# Patient Record
Sex: FEMALE | Race: WHITE | NOT HISPANIC OR LATINO | Employment: OTHER | URBAN - METROPOLITAN AREA
[De-identification: names, ages, dates, MRNs, and addresses within clinical notes are randomized per-mention and may not be internally consistent; named-entity substitution may affect disease eponyms.]

---

## 2023-02-07 PROBLEM — R90.89 MRI OF BRAIN ABNORMAL: Status: ACTIVE | Noted: 2023-02-07

## 2023-02-07 PROBLEM — E66.811 CLASS 1 OBESITY WITH BODY MASS INDEX (BMI) OF 30.0 TO 30.9 IN ADULT: Status: ACTIVE | Noted: 2023-02-07

## 2023-02-07 PROBLEM — B37.0 ORAL THRUSH: Status: ACTIVE | Noted: 2023-02-07

## 2023-02-07 PROBLEM — R53.81 MALAISE AND FATIGUE: Status: ACTIVE | Noted: 2023-02-07

## 2023-02-07 PROBLEM — K20.80 ESOPHAGITIS, LOS ANGELES GRADE B: Status: ACTIVE | Noted: 2023-02-07

## 2023-02-07 PROBLEM — G51.0 FACIAL PARALYSIS ON LEFT SIDE: Status: ACTIVE | Noted: 2023-02-07

## 2023-02-07 PROBLEM — R00.1 BRADYCARDIA, SINUS: Status: ACTIVE | Noted: 2023-02-07

## 2023-02-07 PROBLEM — I35.0 AORTIC STENOSIS: Status: ACTIVE | Noted: 2023-02-07

## 2023-02-07 PROBLEM — D32.9 MENINGIOMA (MULTI): Status: ACTIVE | Noted: 2023-02-07

## 2023-02-07 PROBLEM — R05.9 COUGH: Status: ACTIVE | Noted: 2023-02-07

## 2023-02-07 PROBLEM — I35.0 MILD AORTIC STENOSIS: Status: ACTIVE | Noted: 2023-02-07

## 2023-02-07 PROBLEM — Z04.9 CONDITION NOT FOUND: Status: ACTIVE | Noted: 2023-02-07

## 2023-02-07 PROBLEM — J00 NASOPHARYNGITIS ACUTE: Status: ACTIVE | Noted: 2023-02-07

## 2023-02-07 PROBLEM — R53.83 MALAISE AND FATIGUE: Status: ACTIVE | Noted: 2023-02-07

## 2023-02-07 PROBLEM — R43.2: Status: ACTIVE | Noted: 2023-02-07

## 2023-02-07 PROBLEM — K63.5 POLYP OF COLON: Status: ACTIVE | Noted: 2023-02-07

## 2023-02-07 PROBLEM — H60.543 DERMATITIS OF BOTH EAR CANALS: Status: ACTIVE | Noted: 2023-02-07

## 2023-02-07 PROBLEM — L03.011 PARONYCHIA OF FINGER OF RIGHT HAND: Status: ACTIVE | Noted: 2023-02-07

## 2023-02-07 PROBLEM — Z85.3 HISTORY OF BREAST CANCER: Status: ACTIVE | Noted: 2023-02-07

## 2023-02-07 PROBLEM — U07.1 COVID-19 VIRUS INFECTION: Status: ACTIVE | Noted: 2023-02-07

## 2023-02-07 PROBLEM — M21.6X9 EQUINUS DEFORMITY OF FOOT: Status: ACTIVE | Noted: 2023-02-07

## 2023-02-07 PROBLEM — H10.11 ALLERGIC CONJUNCTIVITIS OF RIGHT EYE: Status: ACTIVE | Noted: 2023-02-07

## 2023-02-07 PROBLEM — R31.29 MICROHEMATURIA: Status: ACTIVE | Noted: 2023-02-07

## 2023-02-07 PROBLEM — G62.9 NEUROPATHY: Status: ACTIVE | Noted: 2023-02-07

## 2023-02-07 PROBLEM — R74.01 ELEVATED AST (SGOT): Status: ACTIVE | Noted: 2023-02-07

## 2023-02-07 PROBLEM — E78.00 HYPERCHOLESTEROLEMIA: Status: ACTIVE | Noted: 2023-02-07

## 2023-02-07 PROBLEM — I49.9 SUPRAVENTRICULAR ARRHYTHMIA: Status: ACTIVE | Noted: 2023-02-07

## 2023-02-07 PROBLEM — M25.512 ACUTE PAIN OF LEFT SHOULDER: Status: ACTIVE | Noted: 2023-02-07

## 2023-02-07 PROBLEM — F33.41 RECURRENT MAJOR DEPRESSIVE DISORDER, IN PARTIAL REMISSION (CMS-HCC): Status: ACTIVE | Noted: 2023-02-07

## 2023-02-07 PROBLEM — M54.50 ACUTE LOW BACK PAIN: Status: RESOLVED | Noted: 2023-02-07 | Resolved: 2023-02-07

## 2023-02-07 PROBLEM — R40.0 SOMNOLENCE, DAYTIME: Status: ACTIVE | Noted: 2023-02-07

## 2023-02-07 PROBLEM — R74.01 ELEVATED ALT MEASUREMENT: Status: ACTIVE | Noted: 2023-02-07

## 2023-02-07 PROBLEM — I63.81 LACUNAR INFARCTION (MULTI): Status: ACTIVE | Noted: 2023-02-07

## 2023-02-07 PROBLEM — M19.012 DEGENERATIVE ARTHRITIS OF LEFT SHOULDER REGION: Status: ACTIVE | Noted: 2023-02-07

## 2023-02-07 PROBLEM — R53.83 FATIGUE: Status: ACTIVE | Noted: 2023-02-07

## 2023-02-07 PROBLEM — L60.3 DYSTROPHIC NAIL: Status: ACTIVE | Noted: 2023-02-07

## 2023-02-07 PROBLEM — E87.0 HYPERNATREMIA: Status: ACTIVE | Noted: 2023-02-07

## 2023-02-07 PROBLEM — F41.1 GENERALIZED ANXIETY DISORDER: Status: ACTIVE | Noted: 2023-02-07

## 2023-02-07 PROBLEM — M79.671 FOOT PAIN, BILATERAL: Status: ACTIVE | Noted: 2023-02-07

## 2023-02-07 PROBLEM — R19.7 DIARRHEA: Status: ACTIVE | Noted: 2023-02-07

## 2023-02-07 PROBLEM — I10 BENIGN ESSENTIAL HYPERTENSION: Status: ACTIVE | Noted: 2023-02-07

## 2023-02-07 PROBLEM — G51.0 LEFT-SIDED BELL'S PALSY: Status: ACTIVE | Noted: 2023-02-07

## 2023-02-07 PROBLEM — E11.9 DIABETES MELLITUS, TYPE 2 (MULTI): Status: ACTIVE | Noted: 2023-02-07

## 2023-02-07 PROBLEM — H60.91 OTITIS EXTERNA OF RIGHT EAR: Status: ACTIVE | Noted: 2023-02-07

## 2023-02-07 PROBLEM — L60.1 ONYCHOLYSIS OF TOENAIL: Status: ACTIVE | Noted: 2023-02-07

## 2023-02-07 PROBLEM — E66.9 CLASS 1 OBESITY WITH BODY MASS INDEX (BMI) OF 30.0 TO 30.9 IN ADULT: Status: ACTIVE | Noted: 2023-02-07

## 2023-02-07 PROBLEM — K22.89 ESOPHAGEAL THICKENING: Status: ACTIVE | Noted: 2023-02-07

## 2023-02-07 PROBLEM — M65.4 DE QUERVAIN'S DISEASE (RADIAL STYLOID TENOSYNOVITIS): Status: ACTIVE | Noted: 2023-02-07

## 2023-02-07 PROBLEM — E55.9 VITAMIN D DEFICIENCY: Status: ACTIVE | Noted: 2023-02-07

## 2023-02-07 PROBLEM — I25.10 CAD (CORONARY ARTERY DISEASE): Status: ACTIVE | Noted: 2023-02-07

## 2023-02-07 PROBLEM — M54.50 ACUTE LOW BACK PAIN: Status: ACTIVE | Noted: 2023-02-07

## 2023-02-07 PROBLEM — M79.671 PAIN OF RIGHT HEEL: Status: ACTIVE | Noted: 2023-02-07

## 2023-02-07 PROBLEM — R06.09 DYSPNEA ON EXERTION: Status: ACTIVE | Noted: 2023-02-07

## 2023-02-07 PROBLEM — R55 ATYPICAL SYNCOPE: Status: ACTIVE | Noted: 2023-02-07

## 2023-02-07 PROBLEM — G47.33 OSA (OBSTRUCTIVE SLEEP APNEA): Status: ACTIVE | Noted: 2023-02-07

## 2023-02-07 PROBLEM — M79.672 FOOT PAIN, BILATERAL: Status: ACTIVE | Noted: 2023-02-07

## 2023-02-07 PROBLEM — G93.9 LESION OF PONS: Status: ACTIVE | Noted: 2023-02-07

## 2023-02-07 PROBLEM — K44.9 HIATAL HERNIA: Status: ACTIVE | Noted: 2023-02-07

## 2023-02-07 PROBLEM — R15.9 FECAL INCONTINENCE: Status: ACTIVE | Noted: 2023-02-07

## 2023-02-07 PROBLEM — E83.52 HYPERCALCEMIA: Status: ACTIVE | Noted: 2023-02-07

## 2023-02-07 PROBLEM — M77.31 CALCANEAL SPUR OF RIGHT FOOT: Status: ACTIVE | Noted: 2023-02-07

## 2023-02-07 PROBLEM — I51.7 MODERATE LEFT VENTRICULAR HYPERTROPHY: Status: ACTIVE | Noted: 2023-02-07

## 2023-02-07 PROBLEM — K76.89 HEPATIC CYST: Status: ACTIVE | Noted: 2023-02-07

## 2023-02-07 PROBLEM — K21.9 GERD (GASTROESOPHAGEAL REFLUX DISEASE): Status: ACTIVE | Noted: 2023-02-07

## 2023-02-07 PROBLEM — F41.8 ANXIETY ASSOCIATED WITH DEPRESSION: Status: ACTIVE | Noted: 2023-02-07

## 2023-02-07 PROBLEM — R91.8 PULMONARY NODULES: Status: ACTIVE | Noted: 2023-02-07

## 2023-02-07 PROBLEM — F43.21 GRIEF: Status: ACTIVE | Noted: 2023-02-07

## 2023-02-07 PROBLEM — K51.40: Status: ACTIVE | Noted: 2023-02-07

## 2023-02-07 PROBLEM — R19.00 PELVIC MASS IN FEMALE: Status: ACTIVE | Noted: 2023-02-07

## 2023-02-07 PROBLEM — K31.89 EROSIVE GASTROPATHY: Status: ACTIVE | Noted: 2023-02-07

## 2023-02-07 PROBLEM — R00.1 BRADYCARDIA, SINUS: Status: RESOLVED | Noted: 2023-02-07 | Resolved: 2023-02-07

## 2023-02-07 PROBLEM — R10.2 PELVIC PAIN IN FEMALE: Status: ACTIVE | Noted: 2023-02-07

## 2023-02-07 PROBLEM — I63.9 LEFT PONTINE STROKE (MULTI): Status: ACTIVE | Noted: 2023-02-07

## 2023-02-07 PROBLEM — K22.2 SCHATZKI'S RING: Status: ACTIVE | Noted: 2023-02-07

## 2023-02-07 PROBLEM — M20.41 HAMMER TOE OF RIGHT FOOT: Status: ACTIVE | Noted: 2023-02-07

## 2023-02-07 PROBLEM — R79.89 ELEVATED SERUM CREATININE: Status: ACTIVE | Noted: 2023-02-07

## 2023-02-07 PROBLEM — R93.89 ABNORMAL MRI: Status: ACTIVE | Noted: 2023-02-07

## 2023-02-07 PROBLEM — I25.10 ARTERIOSCLEROSIS OF CORONARY ARTERY: Status: ACTIVE | Noted: 2023-02-07

## 2023-02-07 PROBLEM — R61 DIAPHORESIS: Status: ACTIVE | Noted: 2023-02-07

## 2023-02-07 PROBLEM — R42 LIGHTHEADEDNESS: Status: ACTIVE | Noted: 2023-02-07

## 2023-02-07 PROBLEM — R06.83 SNORING: Status: ACTIVE | Noted: 2023-02-07

## 2023-02-07 PROBLEM — G25.81 RESTLESS LEGS SYNDROME (RLS): Status: ACTIVE | Noted: 2023-02-07

## 2023-02-07 PROBLEM — R55 SYNCOPE AND COLLAPSE: Status: ACTIVE | Noted: 2023-02-07

## 2023-02-07 PROBLEM — G47.33 OSA ON CPAP: Status: ACTIVE | Noted: 2023-02-07

## 2023-02-07 PROBLEM — K76.0 FATTY LIVER: Status: ACTIVE | Noted: 2023-02-07

## 2023-02-07 RX ORDER — PANTOPRAZOLE SODIUM 40 MG/1
1 TABLET, DELAYED RELEASE ORAL DAILY
COMMUNITY
End: 2023-08-21 | Stop reason: WASHOUT

## 2023-02-07 RX ORDER — ROSUVASTATIN CALCIUM 20 MG/1
1 TABLET, COATED ORAL DAILY
COMMUNITY
End: 2023-07-24 | Stop reason: SDUPTHER

## 2023-02-07 RX ORDER — ESCITALOPRAM OXALATE 10 MG/1
1 TABLET ORAL DAILY
COMMUNITY
End: 2023-05-11 | Stop reason: SDUPTHER

## 2023-02-07 RX ORDER — NIRMATRELVIR AND RITONAVIR 150-100 MG
1 KIT ORAL 2 TIMES DAILY
COMMUNITY
End: 2023-03-21 | Stop reason: ALTCHOICE

## 2023-02-07 RX ORDER — ACETAMINOPHEN 500 MG
1 TABLET ORAL DAILY
COMMUNITY
End: 2023-08-21 | Stop reason: WASHOUT

## 2023-02-07 RX ORDER — EZETIMIBE 10 MG/1
1 TABLET ORAL
COMMUNITY
End: 2023-05-11 | Stop reason: SDUPTHER

## 2023-02-07 RX ORDER — LISINOPRIL 5 MG/1
1 TABLET ORAL DAILY
COMMUNITY
End: 2023-03-09

## 2023-03-09 DIAGNOSIS — I51.7 MODERATE LEFT VENTRICULAR HYPERTROPHY: ICD-10-CM

## 2023-03-09 DIAGNOSIS — I25.10 ARTERIOSCLEROSIS OF CORONARY ARTERY: ICD-10-CM

## 2023-03-09 DIAGNOSIS — I63.81 LACUNAR INFARCTION (MULTI): Primary | ICD-10-CM

## 2023-03-09 RX ORDER — LISINOPRIL 5 MG/1
TABLET ORAL
Qty: 90 TABLET | Refills: 0 | Status: SHIPPED | OUTPATIENT
Start: 2023-03-09 | End: 2023-03-21 | Stop reason: SINTOL

## 2023-03-21 ENCOUNTER — OFFICE VISIT (OUTPATIENT)
Dept: PRIMARY CARE | Facility: CLINIC | Age: 73
End: 2023-03-21
Payer: MEDICARE

## 2023-03-21 VITALS
HEART RATE: 44 BPM | WEIGHT: 204.3 LBS | DIASTOLIC BLOOD PRESSURE: 58 MMHG | BODY MASS INDEX: 31.06 KG/M2 | TEMPERATURE: 96.1 F | SYSTOLIC BLOOD PRESSURE: 104 MMHG

## 2023-03-21 DIAGNOSIS — I25.10 CORONARY ARTERY DISEASE INVOLVING NATIVE CORONARY ARTERY OF NATIVE HEART WITHOUT ANGINA PECTORIS: ICD-10-CM

## 2023-03-21 DIAGNOSIS — K21.00 GASTROESOPHAGEAL REFLUX DISEASE WITH ESOPHAGITIS WITHOUT HEMORRHAGE: ICD-10-CM

## 2023-03-21 DIAGNOSIS — R25.1 TREMOR: ICD-10-CM

## 2023-03-21 DIAGNOSIS — G47.33 OSA ON CPAP: ICD-10-CM

## 2023-03-21 DIAGNOSIS — R03.1 LOW BLOOD PRESSURE READING: ICD-10-CM

## 2023-03-21 DIAGNOSIS — E11.9 TYPE 2 DIABETES MELLITUS WITHOUT COMPLICATION, WITHOUT LONG-TERM CURRENT USE OF INSULIN (MULTI): ICD-10-CM

## 2023-03-21 DIAGNOSIS — R00.1 BRADYCARDIA: ICD-10-CM

## 2023-03-21 DIAGNOSIS — R53.83 MALAISE AND FATIGUE: ICD-10-CM

## 2023-03-21 DIAGNOSIS — R53.81 MALAISE AND FATIGUE: ICD-10-CM

## 2023-03-21 DIAGNOSIS — F33.41 RECURRENT MAJOR DEPRESSIVE DISORDER, IN PARTIAL REMISSION (CMS-HCC): Primary | ICD-10-CM

## 2023-03-21 DIAGNOSIS — E78.00 HYPERCHOLESTEROLEMIA: ICD-10-CM

## 2023-03-21 DIAGNOSIS — R20.8 BURNING SENSATION OF MOUTH: ICD-10-CM

## 2023-03-21 DIAGNOSIS — I10 BENIGN ESSENTIAL HYPERTENSION: ICD-10-CM

## 2023-03-21 DIAGNOSIS — E55.9 VITAMIN D DEFICIENCY: ICD-10-CM

## 2023-03-21 DIAGNOSIS — Z86.69 HISTORY OF TREMOR: ICD-10-CM

## 2023-03-21 PROBLEM — I95.1 ORTHOSTATIC HYPOTENSION: Status: ACTIVE | Noted: 2023-03-21

## 2023-03-21 PROCEDURE — 83036 HEMOGLOBIN GLYCOSYLATED A1C: CPT

## 2023-03-21 PROCEDURE — 80053 COMPREHEN METABOLIC PANEL: CPT

## 2023-03-21 PROCEDURE — 82306 VITAMIN D 25 HYDROXY: CPT

## 2023-03-21 PROCEDURE — 3074F SYST BP LT 130 MM HG: CPT | Performed by: INTERNAL MEDICINE

## 2023-03-21 PROCEDURE — 93000 ELECTROCARDIOGRAM COMPLETE: CPT | Performed by: INTERNAL MEDICINE

## 2023-03-21 PROCEDURE — 1036F TOBACCO NON-USER: CPT | Performed by: INTERNAL MEDICINE

## 2023-03-21 PROCEDURE — 36415 COLL VENOUS BLD VENIPUNCTURE: CPT | Performed by: INTERNAL MEDICINE

## 2023-03-21 PROCEDURE — 87102 FUNGUS ISOLATION CULTURE: CPT

## 2023-03-21 PROCEDURE — 87077 CULTURE AEROBIC IDENTIFY: CPT

## 2023-03-21 PROCEDURE — 3078F DIAST BP <80 MM HG: CPT | Performed by: INTERNAL MEDICINE

## 2023-03-21 PROCEDURE — 82607 VITAMIN B-12: CPT

## 2023-03-21 PROCEDURE — 1159F MED LIST DOCD IN RCRD: CPT | Performed by: INTERNAL MEDICINE

## 2023-03-21 PROCEDURE — 80061 LIPID PANEL: CPT

## 2023-03-21 PROCEDURE — 99215 OFFICE O/P EST HI 40 MIN: CPT | Performed by: INTERNAL MEDICINE

## 2023-03-21 ASSESSMENT — ENCOUNTER SYMPTOMS
DIZZINESS: 1
DYSURIA: 0
CHILLS: 0
ABDOMINAL PAIN: 0
HALLUCINATIONS: 0
CHOKING: 0
CHEST TIGHTNESS: 0
PALPITATIONS: 0
EYE DISCHARGE: 0
RECTAL PAIN: 0
TREMORS: 0
CONSTIPATION: 0
NERVOUS/ANXIOUS: 1
EYE REDNESS: 0
SHORTNESS OF BREATH: 1
FLANK PAIN: 0
LIGHT-HEADEDNESS: 0
HEADACHES: 0
FEVER: 0
BLOOD IN STOOL: 0
FATIGUE: 1
DIARRHEA: 0
DIFFICULTY URINATING: 0
WHEEZING: 0
FACIAL ASYMMETRY: 0
FREQUENCY: 0
APPETITE CHANGE: 0
DYSPHORIC MOOD: 1
DIAPHORESIS: 0
SPEECH DIFFICULTY: 0
ACTIVITY CHANGE: 0
ANAL BLEEDING: 0
EYE PAIN: 0
CONFUSION: 0
COUGH: 1

## 2023-03-21 NOTE — ASSESSMENT & PLAN NOTE
- continue medical therapy -: Crestor 40 mg, ASA 81 mg daily and Zetia 10 mg daily  - monitored by Dr. Chavarria

## 2023-03-21 NOTE — PATIENT INSTRUCTIONS
REFERRALS:    [facility]  [address] [place] [address]  [phone]    [facility]  [phone]

## 2023-03-21 NOTE — ASSESSMENT & PLAN NOTE
-controlled   - urge better ADA diet, continue lisinopril, aspirin, Crestor; advise ophthalmology annual evaluation  -recheck HbA1C

## 2023-03-21 NOTE — ASSESSMENT & PLAN NOTE
Situational worsening despite  Lexapro 10 milligrams daily (no improvement with increase to 20 mg in past)  Referral for counseling for situational issues

## 2023-03-21 NOTE — ASSESSMENT & PLAN NOTE
- continue Crestor 40 grams daily and Zetia 10 milligrams daily  -check fasting lipid profile and LFTs

## 2023-03-21 NOTE — PROGRESS NOTES
"Subjective   Patient ID: Darline Gonzalez is a 73 y.o. female who presents for Follow-up (4m).    She was at the nail salon ~6 weeks ago she felt \"as if she was going someplace\", not exactly lightheaded/dizzy  Person doing her nails later reported she noted her hand/ head shook. She says  tried calling her name and by the 4th time she responded  No syncope. She continued on with appointment.     She feels SOB even at rest intermittently - not daily for the past 6-8 weeks.   Occasional cough with clear phlegm since COVID (January)  She also reports significant fatigue     She saw Dr Key for right sided pelvic pain - she had pelvic US revealing pedunculated fibroid vs other  CT to further evaluate did reveal multiple fibroids and right posterior adnexal mass suggestive of pedunculated fibroid  She has follow up 4/7/23 for another US and exam with Dr Key  She is no longer having constant right sided pelvic pain but still occurs occasionally    She continues to have issues with her adult son who lives with her. She reports he calls her fat and a poor mother - constantly criticizing. She is reluctant to ask him to move out as he lost his job and has had health problems. Denies safety concerns for herself  She requests referral for counseling         Review of Systems   Constitutional:  Positive for fatigue. Negative for activity change, appetite change, chills, diaphoresis and fever.   Eyes:  Negative for pain, discharge, redness and visual disturbance.   Respiratory:  Positive for cough and shortness of breath. Negative for choking, chest tightness and wheezing.    Cardiovascular:  Negative for chest pain, palpitations and leg swelling.   Gastrointestinal:  Negative for abdominal pain, anal bleeding, blood in stool, constipation, diarrhea and rectal pain.   Genitourinary:  Positive for pelvic pain. Negative for difficulty urinating, dysuria, flank pain and frequency.   Skin: Negative.  "   Neurological:  Positive for dizziness (if arises too quickly). Negative for tremors, syncope, facial asymmetry, speech difficulty, light-headedness and headaches.   Psychiatric/Behavioral:  Positive for dysphoric mood. Negative for confusion, hallucinations and suicidal ideas. The patient is nervous/anxious.        Objective   /58   Pulse (!) 44   Temp 35.6 °C (96.1 °F) (Temporal)   Wt 92.7 kg (204 lb 4.8 oz)   BMI 31.06 kg/m²     Physical Exam  Constitutional:       Appearance: Normal appearance. She is obese.   HENT:      Head: Normocephalic and atraumatic.   Eyes:      General: No scleral icterus.     Extraocular Movements: Extraocular movements intact.      Conjunctiva/sclera: Conjunctivae normal.      Pupils: Pupils are equal, round, and reactive to light.   Neck:      Vascular: No carotid bruit.   Cardiovascular:      Rate and Rhythm: Regular rhythm. Bradycardia present.      Pulses: Normal pulses.      Heart sounds: No murmur heard.     No gallop.   Pulmonary:      Effort: Pulmonary effort is normal.      Breath sounds: No wheezing, rhonchi or rales.   Abdominal:      General: Bowel sounds are normal.      Palpations: Abdomen is soft. There is no mass.      Tenderness: There is abdominal tenderness in the right lower quadrant. There is no right CVA tenderness, left CVA tenderness, guarding or rebound.   Musculoskeletal:      Cervical back: Normal range of motion.   Skin:     General: Skin is warm and dry.      Coloration: Skin is not pale.   Neurological:      General: No focal deficit present.      Mental Status: She is alert and oriented to person, place, and time. Mental status is at baseline.      Cranial Nerves: No cranial nerve deficit.      Motor: No weakness.      Gait: Gait normal.   Psychiatric:         Attention and Perception: Attention normal.         Mood and Affect: Mood is depressed.         Speech: Speech normal.         Behavior: Behavior is cooperative.         Thought Content:  Thought content normal.         Cognition and Memory: Cognition and memory normal.         Assessment/Plan   Problem List Items Addressed This Visit          Nervous    TRINA on CPAP     Urge use of CPAP   - Advise weight loss            Circulatory    CAD (coronary artery disease)     - continue medical therapy -: Crestor 40 mg, ASA 81 mg daily and Zetia 10 mg daily  - monitored by Dr. Chavarria         Relevant Orders    Referral to Cardiology    Benign essential hypertension     - Discontinue lisinopril due to hypotension             Relevant Orders    Comprehensive Metabolic Panel    Bradycardia     Advise cardiology referral particularly due to significant bradycardia - 44 in office, 39 on EKG  Despite off metoprolol  Question sick sinus syndrome          Relevant Orders    ECG 12 lead (Completed)    Referral to Cardiology    Low blood pressure reading     Discontinue lisinopril   Advise cardiology referral particularly due to significant bradycardia - 44 in office, 39 on EKG  Despite off metoprolol  Question sick sinus syndrome             Digestive    GERD (gastroesophageal reflux disease)     -Reflux improved with increased pantoprazole from 20 to 40 mg daily             Relevant Orders    Vitamin B12       Endocrine/Metabolic    Diabetes mellitus, type 2 (CMS/HCC)      -controlled   - urge better ADA diet, continue lisinopril, aspirin, Crestor; advise ophthalmology annual evaluation  -recheck HbA1C          Relevant Orders    Hemoglobin A1C    Vitamin D deficiency     - check vitamin D 25-hydroxy         Relevant Orders    Vitamin D, Total       Other    Hypercholesterolemia     - continue Crestor 40 grams daily and Zetia 10 milligrams daily  -check fasting lipid profile and LFTs           Relevant Orders    Lipid Panel    Malaise and fatigue    Relevant Orders    ECG 12 lead (Completed)    Recurrent major depressive disorder, in partial remission (CMS/HCC) - Primary     Stable on Lexapro 10 milligrams  daily;  continue counseling for situational issues           Other Visit Diagnoses       History of tremor        Tremor        Relevant Orders    Referral to Neurology    Burning sensation of mouth        Relevant Orders    Fungal Screen, Yeast

## 2023-03-21 NOTE — ASSESSMENT & PLAN NOTE
Advise cardiology referral particularly due to significant bradycardia - 44 in office, 39 on EKG  Despite off metoprolol  Question sick sinus syndrome

## 2023-03-21 NOTE — ASSESSMENT & PLAN NOTE
Discontinue lisinopril   Advise cardiology referral particularly due to significant bradycardia - 44 in office, 39 on EKG  Despite off metoprolol  Question sick sinus syndrome

## 2023-03-22 LAB
ALANINE AMINOTRANSFERASE (SGPT) (U/L) IN SER/PLAS: 23 U/L (ref 7–45)
ALBUMIN (G/DL) IN SER/PLAS: 4 G/DL (ref 3.4–5)
ALKALINE PHOSPHATASE (U/L) IN SER/PLAS: 50 U/L (ref 33–136)
ANION GAP IN SER/PLAS: 17 MMOL/L (ref 10–20)
ASPARTATE AMINOTRANSFERASE (SGOT) (U/L) IN SER/PLAS: 22 U/L (ref 9–39)
BILIRUBIN TOTAL (MG/DL) IN SER/PLAS: 0.8 MG/DL (ref 0–1.2)
CALCIDIOL (25 OH VITAMIN D3) (NG/ML) IN SER/PLAS: 54 NG/ML
CALCIUM (MG/DL) IN SER/PLAS: 9.8 MG/DL (ref 8.6–10.6)
CARBON DIOXIDE, TOTAL (MMOL/L) IN SER/PLAS: 22 MMOL/L (ref 21–32)
CHLORIDE (MMOL/L) IN SER/PLAS: 108 MMOL/L (ref 98–107)
CHOLESTEROL (MG/DL) IN SER/PLAS: 103 MG/DL (ref 0–199)
CHOLESTEROL IN HDL (MG/DL) IN SER/PLAS: 33.5 MG/DL
CHOLESTEROL/HDL RATIO: 3.1
COBALAMIN (VITAMIN B12) (PG/ML) IN SER/PLAS: 422 PG/ML (ref 211–911)
CREATININE (MG/DL) IN SER/PLAS: 1.23 MG/DL (ref 0.5–1.05)
ESTIMATED AVERAGE GLUCOSE FOR HBA1C: 128 MG/DL
GFR FEMALE: 46 ML/MIN/1.73M2
GLUCOSE (MG/DL) IN SER/PLAS: 134 MG/DL (ref 74–99)
HEMOGLOBIN A1C/HEMOGLOBIN TOTAL IN BLOOD: 6.1 %
LDL: 49 MG/DL (ref 0–99)
POTASSIUM (MMOL/L) IN SER/PLAS: 4 MMOL/L (ref 3.5–5.3)
PROTEIN TOTAL: 6.6 G/DL (ref 6.4–8.2)
SODIUM (MMOL/L) IN SER/PLAS: 143 MMOL/L (ref 136–145)
TRIGLYCERIDE (MG/DL) IN SER/PLAS: 101 MG/DL (ref 0–149)
UREA NITROGEN (MG/DL) IN SER/PLAS: 29 MG/DL (ref 6–23)
VLDL: 20 MG/DL (ref 0–40)

## 2023-03-23 ENCOUNTER — TELEPHONE (OUTPATIENT)
Dept: PRIMARY CARE | Facility: CLINIC | Age: 73
End: 2023-03-23
Payer: MEDICARE

## 2023-03-23 DIAGNOSIS — I10 BENIGN ESSENTIAL HYPERTENSION: ICD-10-CM

## 2023-03-23 DIAGNOSIS — B37.0 ORAL THRUSH: Primary | ICD-10-CM

## 2023-03-23 NOTE — TELEPHONE ENCOUNTER
----- Message from Geraldine Downey MD sent at 3/22/2023 11:18 AM EDT -----  Blood work looks okay except for elevated creatinine and measurement of the renal function would advise rechecking this in a couple weeks well-hydrated if still elevated will need further evaluation.  Avoid NSAIDs as discussed in the office take Tylenol instead if needed for pain.  Also decreased HDL advise increasing activity when able   SHIRA Saw note from Dr Chavarria yesterday

## 2023-03-25 LAB — FUNGAL SCREEN, YEAST: ABNORMAL

## 2023-03-30 RX ORDER — NYSTATIN 100000 [USP'U]/ML
5 SUSPENSION ORAL 4 TIMES DAILY
Qty: 280 ML | Refills: 0 | Status: SHIPPED | OUTPATIENT
Start: 2023-03-30 | End: 2023-05-29

## 2023-03-30 NOTE — TELEPHONE ENCOUNTER
----- Message from Geraldine Downey MD sent at 3/27/2023  5:13 PM EDT -----  Oral fungal culture reveals Candida infection thus advise nystatin swish and swallow 5 mils 4 times daily x14 days

## 2023-04-26 DIAGNOSIS — E78.00 HYPERCHOLESTEROLEMIA: Primary | ICD-10-CM

## 2023-04-26 PROBLEM — Z98.890 POSTOPERATIVE HYPOXIA: Status: ACTIVE | Noted: 2018-05-11

## 2023-04-26 PROBLEM — J81.0 ACUTE PULMONARY EDEMA (MULTI): Status: ACTIVE | Noted: 2018-05-13

## 2023-04-26 PROBLEM — R09.02 POSTOPERATIVE HYPOXIA: Status: ACTIVE | Noted: 2018-05-11

## 2023-04-26 PROBLEM — J96.01 ACUTE RESPIRATORY FAILURE WITH HYPOXEMIA (MULTI): Status: ACTIVE | Noted: 2018-05-13

## 2023-04-26 RX ORDER — ROSUVASTATIN CALCIUM 20 MG/1
20 TABLET, COATED ORAL
COMMUNITY
End: 2023-04-26 | Stop reason: SDUPTHER

## 2023-04-26 RX ORDER — PANTOPRAZOLE SODIUM 40 MG/1
1 TABLET, DELAYED RELEASE ORAL DAILY
COMMUNITY
Start: 2018-07-06 | End: 2023-08-10 | Stop reason: SDUPTHER

## 2023-04-26 RX ORDER — FUROSEMIDE 20 MG/1
20 TABLET ORAL DAILY PRN
COMMUNITY
End: 2023-08-21 | Stop reason: WASHOUT

## 2023-04-26 RX ORDER — ESCITALOPRAM OXALATE 10 MG/1
10 TABLET ORAL
COMMUNITY
End: 2023-05-11 | Stop reason: SDUPTHER

## 2023-04-26 RX ORDER — ROSUVASTATIN CALCIUM 20 MG/1
20 TABLET, COATED ORAL
Qty: 90 TABLET | Refills: 0 | Status: SHIPPED | OUTPATIENT
Start: 2023-04-26 | End: 2023-11-17 | Stop reason: SDUPTHER

## 2023-04-26 RX ORDER — ESCITALOPRAM OXALATE 10 MG/1
1 TABLET ORAL DAILY
COMMUNITY
Start: 2016-04-12 | End: 2023-05-11 | Stop reason: SDUPTHER

## 2023-04-26 RX ORDER — HYDROXYZINE HYDROCHLORIDE 25 MG/1
25 TABLET, FILM COATED ORAL EVERY 6 HOURS PRN
Status: ON HOLD | COMMUNITY
Start: 2015-01-08 | End: 2023-11-21 | Stop reason: WASHOUT

## 2023-04-26 RX ORDER — ROSUVASTATIN CALCIUM 20 MG/1
1 TABLET, COATED ORAL DAILY
COMMUNITY
Start: 2017-08-25 | End: 2023-08-21 | Stop reason: WASHOUT

## 2023-04-26 RX ORDER — LISINOPRIL 10 MG/1
10 TABLET ORAL
COMMUNITY
End: 2024-02-19 | Stop reason: SDUPTHER

## 2023-04-26 RX ORDER — VIT C/E/ZN/COPPR/LUTEIN/ZEAXAN 250MG-90MG
2000 CAPSULE ORAL
COMMUNITY
End: 2023-08-21 | Stop reason: WASHOUT

## 2023-04-26 RX ORDER — GLUCOSAMINE/CHONDR SU A SOD 750-600 MG
400 TABLET ORAL
COMMUNITY
End: 2023-08-21 | Stop reason: WASHOUT

## 2023-04-26 RX ORDER — ASPIRIN 81 MG/1
81 TABLET ORAL
COMMUNITY
Start: 2013-12-10

## 2023-04-26 RX ORDER — LANOLIN ALCOHOL/MO/W.PET/CERES
1 CREAM (GRAM) TOPICAL DAILY
COMMUNITY

## 2023-04-26 RX ORDER — ANASTROZOLE 1 MG/1
1 TABLET ORAL
Status: ON HOLD | COMMUNITY
Start: 2018-04-18 | End: 2023-11-21 | Stop reason: WASHOUT

## 2023-04-26 RX ORDER — VITAMIN E MIXED 400 UNIT
400 CAPSULE ORAL
Status: ON HOLD | COMMUNITY
End: 2023-11-21 | Stop reason: WASHOUT

## 2023-04-26 RX ORDER — EZETIMIBE 10 MG/1
10 TABLET ORAL
COMMUNITY
End: 2023-05-11 | Stop reason: SDUPTHER

## 2023-04-26 RX ORDER — EZETIMIBE 10 MG/1
1 TABLET ORAL DAILY
COMMUNITY
Start: 2016-03-31 | End: 2023-05-12 | Stop reason: SDUPTHER

## 2023-04-26 RX ORDER — OMEGA-3-ACID ETHYL ESTERS 1 G/1
2 CAPSULE, LIQUID FILLED ORAL 2 TIMES DAILY
COMMUNITY
Start: 2013-12-10 | End: 2023-08-21 | Stop reason: WASHOUT

## 2023-04-26 RX ORDER — ACETAMINOPHEN 500 MG
1 TABLET ORAL DAILY
COMMUNITY

## 2023-04-26 RX ORDER — NITROFURANTOIN (MACROCRYSTALS) 100 MG/1
100 CAPSULE ORAL
COMMUNITY
End: 2023-08-21 | Stop reason: WASHOUT

## 2023-04-26 RX ORDER — ATENOLOL 100 MG/1
100 TABLET ORAL
Status: ON HOLD | COMMUNITY
End: 2023-11-21 | Stop reason: WASHOUT

## 2023-05-11 DIAGNOSIS — F41.1 GENERALIZED ANXIETY DISORDER: Primary | ICD-10-CM

## 2023-05-11 PROBLEM — R90.82 WHITE MATTER DISEASE: Status: ACTIVE | Noted: 2023-05-11

## 2023-05-11 RX ORDER — ESCITALOPRAM OXALATE 10 MG/1
10 TABLET ORAL DAILY
Qty: 90 TABLET | Refills: 1 | Status: SHIPPED | OUTPATIENT
Start: 2023-05-11 | End: 2023-11-01 | Stop reason: SDUPTHER

## 2023-05-12 DIAGNOSIS — E78.00 HYPERCHOLESTEROLEMIA: Primary | ICD-10-CM

## 2023-05-12 RX ORDER — EZETIMIBE 10 MG/1
10 TABLET ORAL DAILY
Qty: 90 TABLET | Refills: 1 | Status: SHIPPED | OUTPATIENT
Start: 2023-05-12 | End: 2023-11-21 | Stop reason: HOSPADM

## 2023-05-25 ENCOUNTER — LAB (OUTPATIENT)
Dept: LAB | Facility: LAB | Age: 73
End: 2023-05-25
Payer: MEDICARE

## 2023-05-25 DIAGNOSIS — I10 BENIGN ESSENTIAL HYPERTENSION: ICD-10-CM

## 2023-05-25 PROCEDURE — 80048 BASIC METABOLIC PNL TOTAL CA: CPT

## 2023-05-25 PROCEDURE — 36415 COLL VENOUS BLD VENIPUNCTURE: CPT

## 2023-05-26 LAB
ANION GAP IN SER/PLAS: 13 MMOL/L (ref 10–20)
CALCIUM (MG/DL) IN SER/PLAS: 9.7 MG/DL (ref 8.6–10.6)
CANCER AG 125 (U/ML) IN SERUM: 8.5 U/ML (ref 0–30.2)
CARBON DIOXIDE, TOTAL (MMOL/L) IN SER/PLAS: 26 MMOL/L (ref 21–32)
CHLORIDE (MMOL/L) IN SER/PLAS: 107 MMOL/L (ref 98–107)
CREATININE (MG/DL) IN SER/PLAS: 0.92 MG/DL (ref 0.5–1.05)
GFR FEMALE: 65 ML/MIN/1.73M2
GLUCOSE (MG/DL) IN SER/PLAS: 152 MG/DL (ref 74–99)
POTASSIUM (MMOL/L) IN SER/PLAS: 4.2 MMOL/L (ref 3.5–5.3)
SODIUM (MMOL/L) IN SER/PLAS: 142 MMOL/L (ref 136–145)
UREA NITROGEN (MG/DL) IN SER/PLAS: 16 MG/DL (ref 6–23)

## 2023-06-27 PROBLEM — I49.5 SICK SINUS SYNDROME DUE TO SA NODE DYSFUNCTION (MULTI): Status: ACTIVE | Noted: 2023-06-27

## 2023-06-29 ENCOUNTER — HOSPITAL ENCOUNTER (OUTPATIENT)
Dept: DATA CONVERSION | Facility: HOSPITAL | Age: 73
End: 2023-06-30
Attending: INTERNAL MEDICINE | Admitting: INTERNAL MEDICINE

## 2023-06-29 ENCOUNTER — APPOINTMENT (OUTPATIENT)
Dept: PRIMARY CARE | Facility: CLINIC | Age: 73
End: 2023-06-29
Payer: MEDICARE

## 2023-06-29 DIAGNOSIS — I49.5 SICK SINUS SYNDROME (MULTI): ICD-10-CM

## 2023-06-29 DIAGNOSIS — I25.10 ATHEROSCLEROTIC HEART DISEASE OF NATIVE CORONARY ARTERY WITHOUT ANGINA PECTORIS: ICD-10-CM

## 2023-06-29 DIAGNOSIS — F41.9 ANXIETY DISORDER, UNSPECIFIED: ICD-10-CM

## 2023-06-29 DIAGNOSIS — I35.0 NONRHEUMATIC AORTIC (VALVE) STENOSIS: ICD-10-CM

## 2023-06-29 DIAGNOSIS — E78.5 HYPERLIPIDEMIA, UNSPECIFIED: ICD-10-CM

## 2023-06-29 DIAGNOSIS — K21.9 GASTRO-ESOPHAGEAL REFLUX DISEASE WITHOUT ESOPHAGITIS: ICD-10-CM

## 2023-06-29 DIAGNOSIS — F32.A DEPRESSION, UNSPECIFIED: ICD-10-CM

## 2023-06-29 DIAGNOSIS — E55.9 VITAMIN D DEFICIENCY, UNSPECIFIED: ICD-10-CM

## 2023-06-29 DIAGNOSIS — G47.33 OBSTRUCTIVE SLEEP APNEA (ADULT) (PEDIATRIC): ICD-10-CM

## 2023-06-29 DIAGNOSIS — E11.9 TYPE 2 DIABETES MELLITUS WITHOUT COMPLICATIONS (MULTI): ICD-10-CM

## 2023-06-29 LAB
ACTIVATED PARTIAL THROMBOPLASTIN TIME IN PPP BY COAGULATION ASSAY: 37 SEC (ref 27–38)
ANION GAP IN SER/PLAS: 13 MMOL/L (ref 10–20)
CALCIUM (MG/DL) IN SER/PLAS: 10.1 MG/DL (ref 8.6–10.3)
CARBON DIOXIDE, TOTAL (MMOL/L) IN SER/PLAS: 24 MMOL/L (ref 21–32)
CHLORIDE (MMOL/L) IN SER/PLAS: 108 MMOL/L (ref 98–107)
CREATININE (MG/DL) IN SER/PLAS: 1.16 MG/DL (ref 0.5–1.05)
ERYTHROCYTE DISTRIBUTION WIDTH (RATIO) BY AUTOMATED COUNT: 14 % (ref 11.5–14.5)
ERYTHROCYTE MEAN CORPUSCULAR HEMOGLOBIN CONCENTRATION (G/DL) BY AUTOMATED: 32.6 G/DL (ref 32–36)
ERYTHROCYTE MEAN CORPUSCULAR VOLUME (FL) BY AUTOMATED COUNT: 97 FL (ref 80–100)
ERYTHROCYTES (10*6/UL) IN BLOOD BY AUTOMATED COUNT: 4.91 X10E12/L (ref 4–5.2)
GFR FEMALE: 49 ML/MIN/1.73M2
GLUCOSE (MG/DL) IN SER/PLAS: 111 MG/DL (ref 74–99)
HEMATOCRIT (%) IN BLOOD BY AUTOMATED COUNT: 47.5 % (ref 36–46)
HEMOGLOBIN (G/DL) IN BLOOD: 15.5 G/DL (ref 12–16)
INR IN PPP BY COAGULATION ASSAY: 1.1 (ref 0.9–1.1)
LEUKOCYTES (10*3/UL) IN BLOOD BY AUTOMATED COUNT: 8.1 X10E9/L (ref 4.4–11.3)
NRBC (PER 100 WBCS) BY AUTOMATED COUNT: 0 /100 WBC (ref 0–0)
PLATELETS (10*3/UL) IN BLOOD AUTOMATED COUNT: 178 X10E9/L (ref 150–450)
POTASSIUM (MMOL/L) IN SER/PLAS: 3.8 MMOL/L (ref 3.5–5.3)
PROTHROMBIN TIME (PT) IN PPP BY COAGULATION ASSAY: 12 SEC (ref 9.8–12.8)
SODIUM (MMOL/L) IN SER/PLAS: 141 MMOL/L (ref 136–145)
UREA NITROGEN (MG/DL) IN SER/PLAS: 20 MG/DL (ref 6–23)

## 2023-06-30 LAB
ATRIAL RATE: 30 BPM
ATRIAL RATE: 71 BPM
P AXIS: 101 DEGREES
P OFFSET: 179 MS
P ONSET: 133 MS
PR INTERVAL: 164 MS
Q ONSET: 215 MS
Q ONSET: 219 MS
QRS COUNT: 12 BEATS
QRS COUNT: 7 BEATS
QRS DURATION: 102 MS
QRS DURATION: 90 MS
QT INTERVAL: 414 MS
QT INTERVAL: 500 MS
QTC CALCULATION(BAZETT): 432 MS
QTC CALCULATION(BAZETT): 449 MS
QTC FREDERICIA: 438 MS
QTC FREDERICIA: 454 MS
R AXIS: -14 DEGREES
R AXIS: -16 DEGREES
T AXIS: 102 DEGREES
T AXIS: 116 DEGREES
T OFFSET: 422 MS
T OFFSET: 469 MS
VENTRICULAR RATE: 45 BPM
VENTRICULAR RATE: 71 BPM

## 2023-07-03 ENCOUNTER — DOCUMENTATION (OUTPATIENT)
Dept: PRIMARY CARE | Facility: CLINIC | Age: 73
End: 2023-07-03
Payer: MEDICARE

## 2023-07-03 ENCOUNTER — PATIENT OUTREACH (OUTPATIENT)
Dept: PRIMARY CARE | Facility: CLINIC | Age: 73
End: 2023-07-03
Payer: MEDICARE

## 2023-07-03 NOTE — PROGRESS NOTES
Discharge Facility: [facility]  Discharge Diagnosis: Sick sinus syndrome due to sinoatrial node dysfunction  Admission Date: 6/29/2023  Discharge Date: 6/30/2023    PCP Appointment Date: NONE- TASK  Specialist Appointment Date: NONE  Hospital Encounter and Summary: Linked

## 2023-07-06 ENCOUNTER — TELEPHONE (OUTPATIENT)
Dept: PRIMARY CARE | Facility: CLINIC | Age: 73
End: 2023-07-06
Payer: MEDICARE

## 2023-07-06 NOTE — TELEPHONE ENCOUNTER
----- Message from Sandrita Francois CMA sent at 7/3/2023  1:40 PM EDT -----  Regarding: TCM  Discharge facility: [facility]  Discharge diagnosis:   Sick sinus syndrome due to sinoatrial node dysfunction  Date of discharge:      6/30/2023  Unsuccessful attempts x2 to reach patient for PCP Follow-up  Please have office staff reach out to patient and schedule an appointment within 7-13 days from discharge date.

## 2023-07-14 ENCOUNTER — PATIENT OUTREACH (OUTPATIENT)
Dept: PRIMARY CARE | Facility: CLINIC | Age: 73
End: 2023-07-14
Payer: MEDICARE

## 2023-07-24 DIAGNOSIS — E78.00 HYPERCHOLESTEROLEMIA: Primary | ICD-10-CM

## 2023-07-24 RX ORDER — ROSUVASTATIN CALCIUM 20 MG/1
20 TABLET, COATED ORAL DAILY
Qty: 90 TABLET | Refills: 0 | Status: SHIPPED | OUTPATIENT
Start: 2023-07-24 | End: 2023-08-21 | Stop reason: WASHOUT

## 2023-07-25 ENCOUNTER — APPOINTMENT (OUTPATIENT)
Dept: PRIMARY CARE | Facility: CLINIC | Age: 73
End: 2023-07-25
Payer: MEDICARE

## 2023-08-10 DIAGNOSIS — K21.00 GASTROESOPHAGEAL REFLUX DISEASE WITH ESOPHAGITIS WITHOUT HEMORRHAGE: Primary | ICD-10-CM

## 2023-08-11 RX ORDER — PANTOPRAZOLE SODIUM 40 MG/1
40 TABLET, DELAYED RELEASE ORAL DAILY
Qty: 90 TABLET | Refills: 1 | Status: SHIPPED | OUTPATIENT
Start: 2023-08-11 | End: 2024-01-23 | Stop reason: SDUPTHER

## 2023-08-20 NOTE — PROGRESS NOTES
"Subjective   Patient ID: Darline Gonzalez is a 73 y.o. female who presents for Follow-up (3m).    She had pacemaker placed 6/29/23 per Dr Ramicone for sick sinus syndrome   She had re-evaulation with telemetry - found her pacemaker is not working properly.   She felt good for 8-10 days after pacemaker placed.   She will be following up with Dr Ramicone to discuss replacement    She has fibroid causing \"bulk symptoms\" thus she saw Dr Grimes who will do lap hysterectomy vs morcellation schedule in October.            Review of Systems   Constitutional:  Positive for fatigue. Negative for activity change, appetite change, chills, diaphoresis and fever.   HENT:  Negative for congestion, ear discharge, ear pain, facial swelling, postnasal drip, rhinorrhea, sinus pressure and sore throat.    Eyes:  Negative for pain, discharge and itching.   Respiratory:  Negative for cough, chest tightness, shortness of breath and wheezing.    Cardiovascular:  Negative for chest pain, palpitations and leg swelling.   Gastrointestinal:  Negative for abdominal pain, blood in stool, constipation, diarrhea, nausea and vomiting.   Endocrine: Negative for cold intolerance, heat intolerance, polydipsia, polyphagia and polyuria.   Genitourinary:  Negative for difficulty urinating, dysuria, flank pain, frequency and hematuria.   Musculoskeletal:  Positive for arthralgias. Negative for back pain, joint swelling, myalgias and neck pain.   Skin: Negative.    Neurological:  Positive for light-headedness. Negative for dizziness, syncope, weakness, numbness and headaches.   Hematological:  Negative for adenopathy. Does not bruise/bleed easily.   Psychiatric/Behavioral:  Positive for sleep disturbance. Negative for behavioral problems, confusion, dysphoric mood and suicidal ideas. The patient is not nervous/anxious.        Objective   /76 (BP Location: Right arm, Patient Position: Sitting)   Pulse 76   Wt 91.5 kg (201 lb 11.2 oz)   BMI 30.67 " kg/m²     Physical Exam  Constitutional:       Appearance: Normal appearance. She is normal weight.   HENT:      Head: Normocephalic and atraumatic.   Eyes:      General: No scleral icterus.     Pupils: Pupils are equal, round, and reactive to light.   Neck:      Vascular: No carotid bruit.   Cardiovascular:      Rate and Rhythm: Normal rate and regular rhythm.      Pulses: Normal pulses.      Heart sounds: Normal heart sounds.   Pulmonary:      Effort: Pulmonary effort is normal.      Breath sounds: Normal breath sounds. No wheezing, rhonchi or rales.   Abdominal:      General: Bowel sounds are normal. There is no distension.      Palpations: Abdomen is soft. There is no mass.      Tenderness: There is no abdominal tenderness.      Hernia: No hernia is present.   Musculoskeletal:         General: Normal range of motion.      Cervical back: Normal range of motion.      Right lower leg: No edema.      Left lower leg: No edema.   Skin:     General: Skin is warm and dry.   Neurological:      General: No focal deficit present.      Mental Status: She is alert and oriented to person, place, and time. Mental status is at baseline.   Psychiatric:         Mood and Affect: Mood normal.         Behavior: Behavior normal.         Thought Content: Thought content normal.         Judgment: Judgment normal.         Assessment/Plan   Problem List Items Addressed This Visit       CAD (coronary artery disease) - Primary     Clinically stable  continue medical therapy -: Crestor 40 mg, ASA 81 mg daily and Zetia 10 mg daily  monitored by Dr. Chavarria         Benign essential hypertension     BP well controlled on lisinopril 10 mg daily            Relevant Orders    Comprehensive Metabolic Panel    Diabetes mellitus, type 2 (CMS/HCC)     controlled   urge ADA diet, continue lisinopril, aspirin, Crestor; advise ophthalmology annual evaluation  HbA1C          Relevant Orders    Hemoglobin A1C    Fatty liver     Advise avoid hepatotoxins and  urged weight loss  Will monitor LFTs         Relevant Orders    Comprehensive Metabolic Panel    Generalized anxiety disorder    Hypercholesterolemia     continue Crestor 40 grams daily and Zetia 10 milligrams daily   fasting lipid profile and LFTs         Relevant Orders    Comprehensive Metabolic Panel    Lipid Panel    Recurrent major depressive disorder, in partial remission (CMS/HCC)     Stable with Lexapro 10 mg daily  Urged continued counseling         Vitamin D deficiency     Continue vitamin D 2000 international units daily  vitamin D 25-hydroxy and will further advise         Relevant Orders    Vitamin D, Total    Sick sinus syndrome due to SA node dysfunction (CMS/HCC)     S/p pacemaker placement 6/29/23 but has dysfunctioned thus patient to schedule to have replaced   Monitored by Dr Ramicone

## 2023-08-21 ENCOUNTER — OFFICE VISIT (OUTPATIENT)
Dept: PRIMARY CARE | Facility: CLINIC | Age: 73
End: 2023-08-21
Payer: MEDICARE

## 2023-08-21 VITALS
WEIGHT: 201.7 LBS | HEART RATE: 76 BPM | DIASTOLIC BLOOD PRESSURE: 76 MMHG | SYSTOLIC BLOOD PRESSURE: 128 MMHG | BODY MASS INDEX: 30.67 KG/M2

## 2023-08-21 DIAGNOSIS — I10 BENIGN ESSENTIAL HYPERTENSION: ICD-10-CM

## 2023-08-21 DIAGNOSIS — E78.00 HYPERCHOLESTEROLEMIA: ICD-10-CM

## 2023-08-21 DIAGNOSIS — E55.9 VITAMIN D DEFICIENCY: ICD-10-CM

## 2023-08-21 DIAGNOSIS — I25.10 CORONARY ARTERY DISEASE INVOLVING NATIVE CORONARY ARTERY OF NATIVE HEART WITHOUT ANGINA PECTORIS: Primary | ICD-10-CM

## 2023-08-21 DIAGNOSIS — K76.0 FATTY LIVER: ICD-10-CM

## 2023-08-21 DIAGNOSIS — E11.9 TYPE 2 DIABETES MELLITUS WITHOUT COMPLICATION, WITHOUT LONG-TERM CURRENT USE OF INSULIN (MULTI): ICD-10-CM

## 2023-08-21 DIAGNOSIS — I49.5 SICK SINUS SYNDROME DUE TO SA NODE DYSFUNCTION (MULTI): ICD-10-CM

## 2023-08-21 DIAGNOSIS — F41.1 GENERALIZED ANXIETY DISORDER: ICD-10-CM

## 2023-08-21 DIAGNOSIS — F33.41 RECURRENT MAJOR DEPRESSIVE DISORDER, IN PARTIAL REMISSION (CMS-HCC): ICD-10-CM

## 2023-08-21 PROCEDURE — 1160F RVW MEDS BY RX/DR IN RCRD: CPT | Performed by: INTERNAL MEDICINE

## 2023-08-21 PROCEDURE — 83036 HEMOGLOBIN GLYCOSYLATED A1C: CPT

## 2023-08-21 PROCEDURE — 4010F ACE/ARB THERAPY RXD/TAKEN: CPT | Performed by: INTERNAL MEDICINE

## 2023-08-21 PROCEDURE — 3044F HG A1C LEVEL LT 7.0%: CPT | Performed by: INTERNAL MEDICINE

## 2023-08-21 PROCEDURE — 3074F SYST BP LT 130 MM HG: CPT | Performed by: INTERNAL MEDICINE

## 2023-08-21 PROCEDURE — 99214 OFFICE O/P EST MOD 30 MIN: CPT | Performed by: INTERNAL MEDICINE

## 2023-08-21 PROCEDURE — 1159F MED LIST DOCD IN RCRD: CPT | Performed by: INTERNAL MEDICINE

## 2023-08-21 PROCEDURE — 80061 LIPID PANEL: CPT

## 2023-08-21 PROCEDURE — 3078F DIAST BP <80 MM HG: CPT | Performed by: INTERNAL MEDICINE

## 2023-08-21 PROCEDURE — 82306 VITAMIN D 25 HYDROXY: CPT

## 2023-08-21 PROCEDURE — 1036F TOBACCO NON-USER: CPT | Performed by: INTERNAL MEDICINE

## 2023-08-21 PROCEDURE — 80053 COMPREHEN METABOLIC PANEL: CPT

## 2023-08-21 ASSESSMENT — ENCOUNTER SYMPTOMS
ADENOPATHY: 0
SINUS PRESSURE: 0
RHINORRHEA: 0
FLANK PAIN: 0
EYE ITCHING: 0
VOMITING: 0
POLYPHAGIA: 0
JOINT SWELLING: 0
NERVOUS/ANXIOUS: 0
POLYDIPSIA: 0
FREQUENCY: 0
DIAPHORESIS: 0
DYSPHORIC MOOD: 0
DIZZINESS: 0
NUMBNESS: 0
NAUSEA: 0
ACTIVITY CHANGE: 0
EYE DISCHARGE: 0
SORE THROAT: 0
DYSURIA: 0
NECK PAIN: 0
ABDOMINAL PAIN: 0
CHILLS: 0
WHEEZING: 0
FEVER: 0
SLEEP DISTURBANCE: 1
BLOOD IN STOOL: 0
HEADACHES: 0
SHORTNESS OF BREATH: 0
MYALGIAS: 0
LIGHT-HEADEDNESS: 1
DIARRHEA: 0
CONFUSION: 0
COUGH: 0
CONSTIPATION: 0
DIFFICULTY URINATING: 0
BACK PAIN: 0
CHEST TIGHTNESS: 0
EYE PAIN: 0
PALPITATIONS: 0
HEMATURIA: 0
WEAKNESS: 0
FACIAL SWELLING: 0
FATIGUE: 1
BRUISES/BLEEDS EASILY: 0
ARTHRALGIAS: 1
APPETITE CHANGE: 0

## 2023-08-21 NOTE — ASSESSMENT & PLAN NOTE
Clinically stable  continue medical therapy -: Crestor 40 mg, ASA 81 mg daily and Zetia 10 mg daily  monitored by Dr. Chavarria

## 2023-08-21 NOTE — ASSESSMENT & PLAN NOTE
controlled   urge ADA diet, continue lisinopril, aspirin, Crestor; advise ophthalmology annual evaluation  HbA1C

## 2023-08-21 NOTE — ASSESSMENT & PLAN NOTE
S/p pacemaker placement 6/29/23 but has dysfunctioned thus patient to schedule to have replaced   Monitored by Dr Ramicone

## 2023-08-22 LAB
ALANINE AMINOTRANSFERASE (SGPT) (U/L) IN SER/PLAS: 23 U/L (ref 7–45)
ALBUMIN (G/DL) IN SER/PLAS: 4.5 G/DL (ref 3.4–5)
ALKALINE PHOSPHATASE (U/L) IN SER/PLAS: 49 U/L (ref 33–136)
ANION GAP IN SER/PLAS: 12 MMOL/L (ref 10–20)
ASPARTATE AMINOTRANSFERASE (SGOT) (U/L) IN SER/PLAS: 20 U/L (ref 9–39)
BILIRUBIN TOTAL (MG/DL) IN SER/PLAS: 0.9 MG/DL (ref 0–1.2)
CALCIDIOL (25 OH VITAMIN D3) (NG/ML) IN SER/PLAS: 41 NG/ML
CALCIUM (MG/DL) IN SER/PLAS: 10.1 MG/DL (ref 8.6–10.6)
CARBON DIOXIDE, TOTAL (MMOL/L) IN SER/PLAS: 26 MMOL/L (ref 21–32)
CHLORIDE (MMOL/L) IN SER/PLAS: 107 MMOL/L (ref 98–107)
CHOLESTEROL (MG/DL) IN SER/PLAS: 98 MG/DL (ref 0–199)
CHOLESTEROL IN HDL (MG/DL) IN SER/PLAS: 37.3 MG/DL
CHOLESTEROL/HDL RATIO: 2.6
CREATININE (MG/DL) IN SER/PLAS: 0.82 MG/DL (ref 0.5–1.05)
ESTIMATED AVERAGE GLUCOSE FOR HBA1C: 123 MG/DL
GFR FEMALE: 74 ML/MIN/1.73M2
GLUCOSE (MG/DL) IN SER/PLAS: 110 MG/DL (ref 74–99)
HEMOGLOBIN A1C/HEMOGLOBIN TOTAL IN BLOOD: 5.9 %
LDL: 44 MG/DL (ref 0–99)
POTASSIUM (MMOL/L) IN SER/PLAS: 4 MMOL/L (ref 3.5–5.3)
PROTEIN TOTAL: 7.3 G/DL (ref 6.4–8.2)
SODIUM (MMOL/L) IN SER/PLAS: 141 MMOL/L (ref 136–145)
TRIGLYCERIDE (MG/DL) IN SER/PLAS: 86 MG/DL (ref 0–149)
UREA NITROGEN (MG/DL) IN SER/PLAS: 19 MG/DL (ref 6–23)
VLDL: 17 MG/DL (ref 0–40)

## 2023-08-24 ENCOUNTER — HOSPITAL ENCOUNTER (OUTPATIENT)
Dept: DATA CONVERSION | Facility: HOSPITAL | Age: 73
End: 2023-08-24
Attending: INTERNAL MEDICINE | Admitting: INTERNAL MEDICINE
Payer: MEDICARE

## 2023-08-24 ENCOUNTER — TELEPHONE (OUTPATIENT)
Dept: PRIMARY CARE | Facility: CLINIC | Age: 73
End: 2023-08-24
Payer: MEDICARE

## 2023-08-24 DIAGNOSIS — Z95.5 PRESENCE OF CORONARY ANGIOPLASTY IMPLANT AND GRAFT: ICD-10-CM

## 2023-08-24 DIAGNOSIS — I49.5 SICK SINUS SYNDROME (MULTI): ICD-10-CM

## 2023-08-24 DIAGNOSIS — T82.191A OTHER MECHANICAL COMPLICATION OF CARDIAC PULSE GENERATOR (BATTERY), INITIAL ENCOUNTER: ICD-10-CM

## 2023-08-24 LAB
ACTIVATED PARTIAL THROMBOPLASTIN TIME IN PPP BY COAGULATION ASSAY: 36 SEC (ref 27–38)
ANION GAP IN SER/PLAS: 13 MMOL/L (ref 10–20)
CALCIUM (MG/DL) IN SER/PLAS: 9.4 MG/DL (ref 8.6–10.3)
CARBON DIOXIDE, TOTAL (MMOL/L) IN SER/PLAS: 24 MMOL/L (ref 21–32)
CHLORIDE (MMOL/L) IN SER/PLAS: 109 MMOL/L (ref 98–107)
CREATININE (MG/DL) IN SER/PLAS: 1.1 MG/DL (ref 0.5–1.05)
ERYTHROCYTE DISTRIBUTION WIDTH (RATIO) BY AUTOMATED COUNT: 14.3 % (ref 11.5–14.5)
ERYTHROCYTE MEAN CORPUSCULAR HEMOGLOBIN CONCENTRATION (G/DL) BY AUTOMATED: 33.1 G/DL (ref 32–36)
ERYTHROCYTE MEAN CORPUSCULAR VOLUME (FL) BY AUTOMATED COUNT: 100 FL (ref 80–100)
ERYTHROCYTES (10*6/UL) IN BLOOD BY AUTOMATED COUNT: 4.61 X10E12/L (ref 4–5.2)
GFR FEMALE: 52 ML/MIN/1.73M2
GLUCOSE (MG/DL) IN SER/PLAS: 113 MG/DL (ref 74–99)
HEMATOCRIT (%) IN BLOOD BY AUTOMATED COUNT: 45.9 % (ref 36–46)
HEMOGLOBIN (G/DL) IN BLOOD: 15.2 G/DL (ref 12–16)
INR IN PPP BY COAGULATION ASSAY: 1.1 (ref 0.9–1.1)
LEUKOCYTES (10*3/UL) IN BLOOD BY AUTOMATED COUNT: 6.9 X10E9/L (ref 4.4–11.3)
NRBC (PER 100 WBCS) BY AUTOMATED COUNT: 0 /100 WBC (ref 0–0)
PLATELETS (10*3/UL) IN BLOOD AUTOMATED COUNT: 186 X10E9/L (ref 150–450)
POTASSIUM (MMOL/L) IN SER/PLAS: 4.4 MMOL/L (ref 3.5–5.3)
PROTHROMBIN TIME (PT) IN PPP BY COAGULATION ASSAY: 12.3 SEC (ref 9.8–12.8)
SODIUM (MMOL/L) IN SER/PLAS: 142 MMOL/L (ref 136–145)
UREA NITROGEN (MG/DL) IN SER/PLAS: 17 MG/DL (ref 6–23)

## 2023-08-25 LAB
ATRIAL RATE: 69 BPM
Q ONSET: 227 MS
QRS COUNT: 12 BEATS
QRS DURATION: 148 MS
QT INTERVAL: 452 MS
QTC CALCULATION(BAZETT): 497 MS
QTC FREDERICIA: 482 MS
R AXIS: -45 DEGREES
T AXIS: 79 DEGREES
T OFFSET: 453 MS
VENTRICULAR RATE: 73 BPM

## 2023-09-29 VITALS — WEIGHT: 200.84 LBS | HEIGHT: 68 IN | BODY MASS INDEX: 30.44 KG/M2

## 2023-09-30 NOTE — H&P
History of Present Illness:   History Present Illness:  Reason for surgery: Sick sinus syndrome   HPI:    This is a 73 year old female with a PMH significant for CAD s/p stent placement, AS, DM, TRINA, syncope and SSS.  The patient has history of 4 syncopal episodes inn  2020 and multiple near syncopal episodes.  She completed a Holter monitor that demonstrated junctional bradycardia with a rate of 38 bpm and sinus pauses up to 3.6 seconds.  The patient underwent [employer] dual chamber pacemaker insertion 6/29/23  with Dr. Ramicone.  Approximately 1-2 weeks later the pacemaker was found to not be working appropriately.  She presents to [facility], 8/24/23 for pacemaker generator change with Dr. Ramicone.    PMH:  CAD, AS, DM, TRINA, syncope, SSS, anxiety, depression, GERD, HLD, vitamin D deficiency  PSH:  Skin debridement, left breast lumpectomy, D+C, [id number] insertion, cyst removal  Family history:  Mother-HTN, DM.  Social history:  , never a smoker, daily caffeine use, denies illicit drug use        Past Medical History:        Medical History:   Status post placement of cardiac pacemaker:    AS (aortic stenosis): Onset Date: 16-May-2022   Chronic ischemic heart disease: Onset Date: 16-May-2022    Allergies:        Allergies:  ·  Sulfacetamide Sodium : Unknown  ·  Latex : Unknown    Home Medication Review:   Home Medications Reviewed: yes     Impression/Procedure:   ·  Impression and Planned Procedure: Pacemaker generator change     Review of Systems:   Review of Systems:  Constitutional: POSITIVE: Malaise; NEGATIVE: Fever,  Chills, Anorexia, Weight Loss     Eyes: NEGATIVE: Blurry Vision, Drainage, Diploplia,  Redness, Vision Loss/ Change     ENMT: NEGATIVE: Nasal Discharge, Nasal Congestion,  Ear Pain, Mouth Pain, Throat Pain     Respiratory: NEGATIVE: Dry Cough, Productive Cough,  Hemoptysis, Wheezing, Shortness of Breath     Cardiac: POSITIVE: Palpitations; NEGATIVE: Chest  Pain, Dyspnea  on Exertion, Orthopnea, Syncope     Gastrointestinal: NEGATIVE: Nausea, Vomiting, Diarrhea,  Constipation, Abdominal Pain     Genitourinary: NEGATIVE: Discharge, Dysuria, Flank  Pain, Frequency, Hematuria     Musculoskeletal: NEGATIVE: Decreased ROM, Pain,  Swelling, Stiffness, Weakness     Neurological: NEGATIVE: Dizziness, Confusion, Headache,  Seizures, Syncope     Psychiatric: NEGATIVE: Mood Changes, Anxiety, Hallucinations,  Sleep Changes, Suicidal Ideas     Skin: NEGATIVE: Mass, Pain, Pruritus, Rash, Ulcer     Endocrine: NEGATIVE: Heat Intolerance, Cold Intolerance,  Sweat, Polyuria, Thirst     Hematologic/Lymph: NEGATIVE: Anemia, Bruising,  Easy Bleeding, Night Sweats, Petechiae     Allergic/Immunologic: NEGATIVE: Anaphylaxis, Itchy/  Teary Eyes, Itching, Sneezing, Swelling         Physical Exam by System:    Constitutional: alert and cooperative   Eyes: clear sclera   ENMT: mucous membranes moist   Head/Neck: No JVD, trachea midline   Respiratory/Thorax: Patent airways, CTAB, normal  breath sounds with good chest expansion, thorax symmetric   Cardiovascular: Regular, rate and rhythm, no murmurs,  + pulses of the extremities, normal S1 and S2   Musculoskeletal: ROM intact, no joint swelling, normal  strength   Extremities: no cyanosis, no edema   Neurological: alert and oriented x3   Psychological: Appropriate mood and behavior   Skin: Warm and dry     Airway/Sedation Assessment:    Oropharyngeal Classification:          ·  Oropharyngeal Classification Class III   ·  ASA PS Classification ASA III   ·  Sedation Plan moderate sedation     Consent:   COVID-19 Consent:  ·  COVID-19 Risk Consent Surgeon has reviewed key risks related to the risk of clarisa COVID-19 and if they contract COVID-19 what the risks are.       Electronic Signatures:  Noelle Padgett (APRN-CNP)   (Signed 24-Aug-2023 14:39)   Authored: History of Present Illness, Past Medical History, Allergies, Home Medication Review,  Impression/Procedure, Review of Systems, Physical Exam, Consent, Note Completion  Ramicone, James ()   (Signed 24-Aug-2023 16:18)   Co-Signer: History of Present Illness, Past Medical History, Allergies, Home Medication Review, Impression/Procedure, Review of Systems, Physical Exam, Consent, Note Completion    Last Updated: 24-Aug-2023 16:18 by Ramicone, James (DO)

## 2023-09-30 NOTE — H&P
History of Present Illness:   History Present Illness:  Reason for surgery: Sick sinus syndrome   HPI:    This is a 73 year old female with a PMH significant for CAD s/p stent placement, AS, DM, TRINA, syncope and SSS.  The patient has history of 4 syncopal episodes on  2020 and multiple near syncopal episodes  She completed a Holter monitor that demonstrated junctional bradycardia with a rate of 38 bpm and sinus pauses up to 3.6 seconds.  The patient presented to [facility] today, 6/29/23 for permanent pacemaker insertion  with Dr. Ramicone.      PMH:  CAD, AS, DM, TRINA, syncope, SSS, anxiety, depression, GERD, HLD, vitamin D deficiency  PSH:  Skin debridement, left breast lumpectomy, D+C, [id number]-[id number]-mame insertion, cyst removal  Family history:  Mother-HTN, DM.  Social history:  , never a smoker, daily caffeine use, denies illicit drug use    Past Medical History:        Medical History:   AS (aortic stenosis): Onset Date: 16-May-2022   Chronic ischemic heart disease: Onset Date: 16-May-2022    Allergies:        Allergies:  ·  Sulfacetamide Sodium : Unknown  ·  Latex : Unknown    Home Medication Review:   Home Medications Reviewed: yes     Impression/Procedure:   ·  Impression and Planned Procedure: Permanent pacemaker insertion     Review of Systems:   Review of Systems:  Constitutional: POSITIVE: Malaise; NEGATIVE: Fever,  Chills, Anorexia, Weight Loss     Eyes: NEGATIVE: Blurry Vision, Drainage, Diploplia,  Redness, Vision Loss/ Change     ENMT: NEGATIVE: Nasal Discharge, Nasal Congestion,  Ear Pain, Mouth Pain, Throat Pain     Respiratory: NEGATIVE: Dry Cough, Productive Cough,  Hemoptysis, Wheezing, Shortness of Breath     Cardiac: POSITIVE: Syncope; NEGATIVE: Chest Pain,  Dyspnea on Exertion, Orthopnea, Palpitations     Gastrointestinal: NEGATIVE: Nausea, Vomiting, Diarrhea,  Constipation, Abdominal Pain     Genitourinary: NEGATIVE: Discharge, Dysuria, Flank  Pain, Frequency, Hematuria     Musculoskeletal:  NEGATIVE: Decreased ROM, Pain,  Swelling, Stiffness, Weakness     Neurological: POSITIVE: Dizziness; NEGATIVE: Confusion,  Headache, Seizures, Syncope     Psychiatric: NEGATIVE: Mood Changes, Anxiety, Hallucinations,  Sleep Changes, Suicidal Ideas     Skin: NEGATIVE: Mass, Pain, Pruritus, Rash, Ulcer     Endocrine: NEGATIVE: Heat Intolerance, Cold Intolerance,  Sweat, Polyuria, Thirst     Hematologic/Lymph: NEGATIVE: Anemia, Bruising,  Easy Bleeding, Night Sweats, Petechiae     Allergic/Immunologic: NEGATIVE: Anaphylaxis, Itchy/  Teary Eyes, Itching, Sneezing, Swelling         Physical Exam by System:    Constitutional: alert and cooperative   Eyes: clear sclera   ENMT: mucous membranes moist   Head/Neck: No JVD, trachea midline   Respiratory/Thorax: Patent airways, CTAB, normal  breath sounds with good chest expansion, thorax symmetric   Cardiovascular: Jeremy, no murmurs, + pulses of the  extremities, normal S1 and S2   Gastrointestinal: Nondistended, soft, non-tender,  no masses palpable, no organomegaly, +BS   Musculoskeletal: ROM intact, no joint swelling, normal  strength   Extremities: no cyanosis, no edema,  no clubbing   Neurological: alert and oriented x3   Psychological: Appropriate mood and behavior   Skin: Warm and dry     Airway/Sedation Assessment:  ·  Mouth Opening OK yes    ·  Neck Flexibility OK yes    ·  Loose Teeth no      Oropharyngeal Classification:          ·  Oropharyngeal Classification Class III    ·  ASA PS Classification ASA II    ·  Sedation Plan moderate sedation      Consent:   COVID-19 Consent:  ·  COVID-19 Risk Consent Surgeon has reviewed key risks related to the risk of clarisa COVID-19 and if they contract COVID-19 what the risks are.       Electronic Signatures:  Noelle Padgett (APRN-CNP)   (Signed 29-Jun-2023 13:45)   Authored: History of Present Illness, Past Medical History, Allergies, Home Medication Review, Impression/Procedure, Review of Systems, Physical Exam,  Consent, Note Completion  Ramicone, James ()   (Signed 30-Jun-2023 12:41)   Co-Signer: History of Present Illness, Past Medical History, Allergies, Home Medication Review, Impression/Procedure, Review of Systems, Physical Exam, Consent, Note Completion    Last Updated: 30-Jun-2023 12:41 by Ramicone, James (DO)

## 2023-10-16 ENCOUNTER — APPOINTMENT (OUTPATIENT)
Dept: CARDIOLOGY | Facility: HOSPITAL | Age: 73
End: 2023-10-16
Payer: MEDICARE

## 2023-10-16 ENCOUNTER — HOSPITAL ENCOUNTER (OUTPATIENT)
Dept: CARDIOLOGY | Facility: HOSPITAL | Age: 73
Discharge: HOME | End: 2023-10-16
Payer: MEDICARE

## 2023-10-16 ENCOUNTER — HOSPITAL ENCOUNTER (OUTPATIENT)
Dept: RADIOLOGY | Facility: HOSPITAL | Age: 73
Discharge: HOME | End: 2023-10-16
Payer: MEDICARE

## 2023-10-16 VITALS
HEART RATE: 66 BPM | SYSTOLIC BLOOD PRESSURE: 153 MMHG | RESPIRATION RATE: 20 BRPM | OXYGEN SATURATION: 96 % | DIASTOLIC BLOOD PRESSURE: 74 MMHG

## 2023-10-16 DIAGNOSIS — R00.1 BRADYCARDIA: Primary | ICD-10-CM

## 2023-10-16 DIAGNOSIS — I49.9 CARDIAC ARRHYTHMIA, UNSPECIFIED CARDIAC ARRHYTHMIA TYPE: ICD-10-CM

## 2023-10-16 DIAGNOSIS — R00.1 BRADYCARDIA: ICD-10-CM

## 2023-10-16 DIAGNOSIS — D25.9 LEIOMYOMA OF UTERUS, UNSPECIFIED: ICD-10-CM

## 2023-10-16 PROCEDURE — 72197 MRI PELVIS W/O & W/DYE: CPT

## 2023-10-16 PROCEDURE — 96372 THER/PROPH/DIAG INJ SC/IM: CPT | Performed by: STUDENT IN AN ORGANIZED HEALTH CARE EDUCATION/TRAINING PROGRAM

## 2023-10-16 PROCEDURE — 2550000001 HC RX 255 CONTRASTS: Performed by: STUDENT IN AN ORGANIZED HEALTH CARE EDUCATION/TRAINING PROGRAM

## 2023-10-16 PROCEDURE — 93280 PM DEVICE PROGR EVAL DUAL: CPT

## 2023-10-16 PROCEDURE — A9575 INJ GADOTERATE MEGLUMI 0.1ML: HCPCS | Performed by: STUDENT IN AN ORGANIZED HEALTH CARE EDUCATION/TRAINING PROGRAM

## 2023-10-16 PROCEDURE — 93290 INTERROG DEV EVAL ICPMS IP: CPT | Performed by: INTERNAL MEDICINE

## 2023-10-16 PROCEDURE — 72197 MRI PELVIS W/O & W/DYE: CPT | Performed by: RADIOLOGY

## 2023-10-16 PROCEDURE — 2500000004 HC RX 250 GENERAL PHARMACY W/ HCPCS (ALT 636 FOR OP/ED): Mod: JZ,JG | Performed by: STUDENT IN AN ORGANIZED HEALTH CARE EDUCATION/TRAINING PROGRAM

## 2023-10-16 PROCEDURE — 93280 PM DEVICE PROGR EVAL DUAL: CPT | Mod: MUE

## 2023-10-16 PROCEDURE — 93280 PM DEVICE PROGR EVAL DUAL: CPT | Performed by: INTERNAL MEDICINE

## 2023-10-16 RX ORDER — GADOTERATE MEGLUMINE 376.9 MG/ML
17 INJECTION INTRAVENOUS
Status: COMPLETED | OUTPATIENT
Start: 2023-10-16 | End: 2023-10-16

## 2023-10-16 RX ADMIN — GLUCAGON 1 MG: KIT at 11:45

## 2023-10-16 RX ADMIN — GADOTERATE MEGLUMINE 17 ML: 376.9 INJECTION INTRAVENOUS at 11:48

## 2023-10-16 NOTE — NURSING NOTE
1000 Patient arrives to MRI to have pelvis MRI exam; device clinic notified to interrogate patient's pacemaker into MRI safe mode. BL  1212 deice clinic nurse returns to reset pt. To her previous setting; pt. C/O mild [id number] to sit up after scan completed. BL

## 2023-10-24 NOTE — RESULT ENCOUNTER NOTE
Vinicio Gregorio,    I wanted to let you know I received your MRI results. They did show fibroids, as we suspected, but we’re otherwise normal.     Please let me know if you have questions!     Take care,   Dr. Grimes

## 2023-10-31 ENCOUNTER — TELEPHONE (OUTPATIENT)
Dept: OBSTETRICS AND GYNECOLOGY | Facility: CLINIC | Age: 73
End: 2023-10-31
Payer: MEDICARE

## 2023-10-31 NOTE — TELEPHONE ENCOUNTER
Patient came in for an appointment to discuss MRI results but not appointment on schedule.  MRI results reviewed with patient pending Dr. Grimes review.    Patient would like to discuss MRI results and which type of procedure (surgery) she will have on November 20.    Patient ok for Dr. Grimes to call her.

## 2023-11-01 DIAGNOSIS — F41.1 GENERALIZED ANXIETY DISORDER: ICD-10-CM

## 2023-11-01 RX ORDER — ESCITALOPRAM OXALATE 10 MG/1
10 TABLET ORAL DAILY
Qty: 90 TABLET | Refills: 0 | Status: SHIPPED | OUTPATIENT
Start: 2023-11-01 | End: 2024-01-23 | Stop reason: SDUPTHER

## 2023-11-02 ENCOUNTER — TELEMEDICINE CLINICAL SUPPORT (OUTPATIENT)
Dept: PREADMISSION TESTING | Facility: HOSPITAL | Age: 73
End: 2023-11-02
Payer: MEDICARE

## 2023-11-02 NOTE — TELEPHONE ENCOUNTER
Patient was given appointment (phone) on 11/6/23 at 1100 with Dr. Grimes.    Appointment will be need to changed to virtual.  Patient is aware.

## 2023-11-03 ENCOUNTER — TELEPHONE (OUTPATIENT)
Dept: CARDIOLOGY | Facility: CLINIC | Age: 73
End: 2023-11-03
Payer: MEDICARE

## 2023-11-03 NOTE — TELEPHONE ENCOUNTER
Patient scheduled for myomectomy with Dr. Grimes under general anesthesia on 11/20/23.  Requesting clearance and ok to hold aspirin.

## 2023-11-06 ENCOUNTER — OFFICE VISIT (OUTPATIENT)
Dept: OBSTETRICS AND GYNECOLOGY | Facility: CLINIC | Age: 73
End: 2023-11-06
Payer: MEDICARE

## 2023-11-06 DIAGNOSIS — R10.2 PELVIC PAIN: ICD-10-CM

## 2023-11-06 DIAGNOSIS — Z78.0 POST-MENOPAUSAL: ICD-10-CM

## 2023-11-06 DIAGNOSIS — D21.9 FIBROIDS: Primary | ICD-10-CM

## 2023-11-06 PROCEDURE — 4010F ACE/ARB THERAPY RXD/TAKEN: CPT | Performed by: STUDENT IN AN ORGANIZED HEALTH CARE EDUCATION/TRAINING PROGRAM

## 2023-11-06 PROCEDURE — 99214 OFFICE O/P EST MOD 30 MIN: CPT | Performed by: STUDENT IN AN ORGANIZED HEALTH CARE EDUCATION/TRAINING PROGRAM

## 2023-11-06 PROCEDURE — 3044F HG A1C LEVEL LT 7.0%: CPT | Performed by: STUDENT IN AN ORGANIZED HEALTH CARE EDUCATION/TRAINING PROGRAM

## 2023-11-06 PROCEDURE — 1160F RVW MEDS BY RX/DR IN RCRD: CPT | Performed by: STUDENT IN AN ORGANIZED HEALTH CARE EDUCATION/TRAINING PROGRAM

## 2023-11-06 PROCEDURE — 3078F DIAST BP <80 MM HG: CPT | Performed by: STUDENT IN AN ORGANIZED HEALTH CARE EDUCATION/TRAINING PROGRAM

## 2023-11-06 PROCEDURE — 1126F AMNT PAIN NOTED NONE PRSNT: CPT | Performed by: STUDENT IN AN ORGANIZED HEALTH CARE EDUCATION/TRAINING PROGRAM

## 2023-11-06 PROCEDURE — 1036F TOBACCO NON-USER: CPT | Performed by: STUDENT IN AN ORGANIZED HEALTH CARE EDUCATION/TRAINING PROGRAM

## 2023-11-06 PROCEDURE — 3074F SYST BP LT 130 MM HG: CPT | Performed by: STUDENT IN AN ORGANIZED HEALTH CARE EDUCATION/TRAINING PROGRAM

## 2023-11-06 PROCEDURE — 99214 OFFICE O/P EST MOD 30 MIN: CPT | Mod: PO | Performed by: STUDENT IN AN ORGANIZED HEALTH CARE EDUCATION/TRAINING PROGRAM

## 2023-11-06 PROCEDURE — 1159F MED LIST DOCD IN RCRD: CPT | Performed by: STUDENT IN AN ORGANIZED HEALTH CARE EDUCATION/TRAINING PROGRAM

## 2023-11-06 NOTE — PROGRESS NOTES
Division of Minimally Invasive Gynecologic Surgery  [facility]    11/06/23 Gynecology Visit    CC: Preop follow up     Darline Gonzalez is a 73 y.o. P1 w/ known fibroid uterus presents in follow up to discuss preop planning for hysterectomy.     Recent MRI pelvis showed uterus measuring 8cm x 8cm x 12cm with pedunculated fibroid (8cm x 5cm x 7cm) and a 4cm x 6cm x 5cm submucosal fibroid.     Last mammogram: 8/2022 [facility] 1   Last colonoscopy: 2-3 years ago per pt, planned for repeat in 5 years   PMHx: Recent BMP w/ creatinine 1.16, T2DM w/ recent A1C 6.1%, hx of breast cancer (s/p lumpectomy and rads 2011), hx of necrotizing fascitis during breast cancer treatment  PSHx: breast reduction, wound debridement for gluteal nec fasc w/ laparoscopic diverting ostomy for wound healing     PMHx, PSHx, SHx, Allergies, and Medications updated in Epic.    ROS: reviewed and negative    PE: Virtual visit conducted via telephone, no vital signs or exam performed     A/P: Darline Gonzalez is a 73 y.o. P1 w/ known fibroid uterus presents in follow up to discuss preop planning for hysterectomy.     We discussed options at length today, including myomectomy, hysterectomy, and oophorectomy vs ovarian retention. She would like to proceed w/ laparoscopic hysterectomy and bilateral salpingo-oophorectomy. We reviewed again risks/benefits of surgery, as well as plan for post operative care with regard to post op restrictions, pain management, driving, etc. We will transition surgical plan to [facility]-BSO.     If we encounter significant scar tissue and anticipate hysterectomy will be unnecessarily complicated, she would prefer removal of pedunculated fibroid only.     Message sent to Jenny Payan to adjust surgical scheduling.     Dina Grimes MD  Division of Minimally Invasive Gynecologic Surgery  [facility]

## 2023-11-06 NOTE — TELEPHONE ENCOUNTER
Spoke with patient and instructions provided, understanding verb. Clearance faxed successfully.   warm

## 2023-11-10 ENCOUNTER — PRE-ADMISSION TESTING (OUTPATIENT)
Dept: PREADMISSION TESTING | Facility: HOSPITAL | Age: 73
End: 2023-11-10
Payer: MEDICARE

## 2023-11-10 VITALS
WEIGHT: 205.9 LBS | HEIGHT: 68 IN | TEMPERATURE: 99.3 F | HEART RATE: 60 BPM | OXYGEN SATURATION: 97 % | SYSTOLIC BLOOD PRESSURE: 111 MMHG | BODY MASS INDEX: 31.2 KG/M2 | DIASTOLIC BLOOD PRESSURE: 61 MMHG

## 2023-11-10 DIAGNOSIS — Z01.818 PREOPERATIVE EXAMINATION: Primary | ICD-10-CM

## 2023-11-10 LAB
ABO GROUP (TYPE) IN BLOOD: NORMAL
ANION GAP SERPL CALC-SCNC: 14 MMOL/L (ref 10–20)
ANTIBODY SCREEN: NORMAL
BUN SERPL-MCNC: 17 MG/DL (ref 6–23)
CALCIUM SERPL-MCNC: 9.7 MG/DL (ref 8.6–10.6)
CHLORIDE SERPL-SCNC: 108 MMOL/L (ref 98–107)
CO2 SERPL-SCNC: 24 MMOL/L (ref 21–32)
CREAT SERPL-MCNC: 0.82 MG/DL (ref 0.5–1.05)
ERYTHROCYTE [DISTWIDTH] IN BLOOD BY AUTOMATED COUNT: 14 % (ref 11.5–14.5)
GFR SERPL CREATININE-BSD FRML MDRD: 75 ML/MIN/1.73M*2
GLUCOSE SERPL-MCNC: 130 MG/DL (ref 74–99)
HCT VFR BLD AUTO: 45.4 % (ref 36–46)
HGB BLD-MCNC: 15.1 G/DL (ref 12–16)
MCH RBC QN AUTO: 32.7 PG (ref 26–34)
MCHC RBC AUTO-ENTMCNC: 33.3 G/DL (ref 32–36)
MCV RBC AUTO: 98 FL (ref 80–100)
NRBC BLD-RTO: 0 /100 WBCS (ref 0–0)
PLATELET # BLD AUTO: 181 X10*3/UL (ref 150–450)
POTASSIUM SERPL-SCNC: 4.2 MMOL/L (ref 3.5–5.3)
RBC # BLD AUTO: 4.62 X10*6/UL (ref 4–5.2)
RH FACTOR (ANTIGEN D): NORMAL
SODIUM SERPL-SCNC: 142 MMOL/L (ref 136–145)
WBC # BLD AUTO: 6.6 X10*3/UL (ref 4.4–11.3)

## 2023-11-10 PROCEDURE — 86901 BLOOD TYPING SEROLOGIC RH(D): CPT | Performed by: NURSE PRACTITIONER

## 2023-11-10 PROCEDURE — 36415 COLL VENOUS BLD VENIPUNCTURE: CPT

## 2023-11-10 PROCEDURE — 85027 COMPLETE CBC AUTOMATED: CPT | Performed by: NURSE PRACTITIONER

## 2023-11-10 PROCEDURE — 99204 OFFICE O/P NEW MOD 45 MIN: CPT | Performed by: NURSE PRACTITIONER

## 2023-11-10 PROCEDURE — 80048 BASIC METABOLIC PNL TOTAL CA: CPT | Performed by: NURSE PRACTITIONER

## 2023-11-10 ASSESSMENT — ENCOUNTER SYMPTOMS
MUSCULOSKELETAL NEGATIVE: 1
ENDOCRINE NEGATIVE: 1
NECK NEGATIVE: 1
CONSTITUTIONAL NEGATIVE: 1
EYES NEGATIVE: 1
GASTROINTESTINAL NEGATIVE: 1
RESPIRATORY NEGATIVE: 1
NEUROLOGICAL NEGATIVE: 1
CARDIOVASCULAR NEGATIVE: 1

## 2023-11-10 ASSESSMENT — DUKE ACTIVITY SCORE INDEX (DASI)
CAN YOU TAKE CARE OF YOURSELF (EAT, DRESS, BATHE, OR USE TOILET): YES
CAN YOU DO HEAVY WORK AROUND THE HOUSE LIKE SCRUBBING FLOORS OR LIFTING AND MOVING HEAVY FURNITURE: NO
CAN YOU PARTICIPATE IN STRENOUS SPORTS LIKE SWIMMING, SINGLES TENNIS, FOOTBALL, BASKETBALL, OR SKIING: NO
DASI METS SCORE: 5.1
CAN YOU PARTICIPATE IN MODERATE RECREATIONAL ACTIVITIES LIKE GOLF, BOWLING, DANCING, DOUBLES TENNIS OR THROWING A BASEBALL OR FOOTBALL: NO
CAN YOU DO LIGHT WORK AROUND THE HOUSE LIKE DUSTING OR WASHING DISHES: YES
CAN YOU HAVE SEXUAL RELATIONS: NO
CAN YOU WALK A BLOCK OR TWO ON LEVEL GROUND: YES
CAN YOU DO YARD WORK LIKE RAKING LEAVES, WEEDING OR PUSHING A MOWER: NO
CAN YOU RUN A SHORT DISTANCE: NO
CAN YOU WALK INDOORS, SUCH AS AROUND YOUR HOUSE: YES
CAN YOU DO MODERATE WORK AROUND THE HOUSE LIKE VACUUMING, SWEEPING FLOORS OR CARRYING GROCERIES: YES
TOTAL_SCORE: 18.95
CAN YOU CLIMB A FLIGHT OF STAIRS OR WALK UP A HILL: YES

## 2023-11-10 ASSESSMENT — LIFESTYLE VARIABLES: SMOKING_STATUS: NONSMOKER

## 2023-11-10 NOTE — PREPROCEDURE INSTRUCTIONS
NPO Instructions:    Do not eat any food after midnight the night before your surgery/procedure.  You may have 10 ounces of clear liquids until TWO hours before surgery/procedure. This includes water, black tea/coffee, (no milk or cream) apple juice and electrolyte drinks (Gatorade).  You may chew gum up to TWO hours before your surgery/procedure.    Additional Instructions:     The Day before Surgery:  Review your medication instructions, stop indicated medications  You will be contacted in the evening regarding the time of your arrival to facility and surgery time    Day of Surgery:  Review your medication instructions, take indicated medications  Wear  comfortable loose fitting clothing  Do not use moisturizers, creams, lotions or perfume  All jewelry and valuables should be left at home      Samantha Meeson, MSN, NP-C  Adult-Gerontology Nurse Practitioner II  Department of Anesthesiology and Perioperative Medicine  Main phone [phone]  Direct phone [phone]  Fax [phone]

## 2023-11-10 NOTE — CPM/PAT H&P
CPM/PAT Evaluation       Name: Darline Gonzalez (Darline Gonzalez)  /Age: 1950/73 y.o.     Visit Type:   In-Person       Chief Complaint: fibroids scheduled for surgery    HPI: The patient is a 73 year old female scheduled for total laparoscopic hysterectomy and salpingo-oophorectomy on 23 for treatment of fibroid uterus.  The patient is referred by Dr. Dina Grimes for preoperative evaluation of HTN, HLD, sick sinus syndrome s/p permanent pacemaker placement, anxiety, depression, fatty liver, deviated nasal septum, fibroid uterus, GERD, RLS, and TRINA compliant with CPAP, CAD with bare metal stent in .       Past Medical History:   Diagnosis Date    Anxiety     Arrhythmia     Sick sinus syndrome due to SA node dysfunction    Coronary artery disease     cards: Raju Deon,Aortic stenosis    Depression     Deviated nasal septum     Deviated septum    Dry mouth, unspecified     Dry mouth    Dysfunctional uterine bleeding     Elevated C-reactive protein (CRP)     CRP elevated    Fatty (change of) liver, not elsewhere classified     Fatty liver    Fibroid     Ob/Gyn; Dina Grimes    GERD (gastroesophageal reflux disease)     Heart disease     Hemorrhage of anus and rectum     Rectal bleed    Hyperlipidemia     Hypertension     Incisional hernia     Localized edema     Bilateral edema of lower extremity    Low back pain, unspecified 10/23/2016    Acute low back pain    Other abnormal glucose     Elevated glucose    Other conditions influencing health status 2019    History of cough    Other specified abnormal immunological findings in serum     Positive KURT (antinuclear antibody)    Personal history of malignant neoplasm of breast 2022    History of malignant neoplasm of breast    Personal history of other benign neoplasm     History of uterine leiomyoma    Personal history of other diseases of the musculoskeletal system and connective tissue     History of necrotizing fasciitis    Personal  history of other diseases of the musculoskeletal system and connective tissue     History of temporomandibular joint disorder    Personal history of other diseases of the nervous system and sense organs     History of carpal tunnel syndrome    Restless leg syndrome     Sleep apnea     Uses CPAP    Syncope     hx of syncopal episodes and near syncopal episodes.       Past Surgical History:   Procedure Laterality Date    BREAST LUMPECTOMY  02/25/2014    Left Breast Lumpectomy    BREAST SURGERY  09/10/2018    Breast Surgery Reduction Procedure    CHOLECYSTECTOMY      COLONOSCOPY      DILATION AND CURETTAGE OF UTERUS  02/25/2014    Dilation And Curettage    ENDOSCOPIC INSERTION PERITONEAL CATHETER PORT      INSERT / REPLACE / REMOVE PACEMAKER  06/09/2023    MR HEAD ANGIO WO IV CONTRAST  06/29/2020    MR HEAD ANGIO WO IV CONTRAST 6/29/2020 [facility]    OTHER SURGICAL HISTORY  02/25/2014    Surgery    OTHER SURGICAL HISTORY  02/25/2014    Skin Debridement Buttocks    OTHER SURGICAL HISTORY  02/25/2014    Percutaneous Portal Vein Catheter Placement    OTHER SURGICAL HISTORY  06/04/2018    History Of Prior Surgery       Patient  has no history on file for sexual activity.    Family History   Problem Relation Name Age of Onset    Hypertension Mother      Diabetes type II Mother         Allergies   Allergen Reactions    Latex Unknown    Other Itching    Sulfa (Sulfonamide Antibiotics) Itching       Prior to Admission medications    Medication Sig Start Date End Date Taking? Authorizing Provider   anastrozole (Arimidex) 1 mg tablet Take 1 tablet (1 mg total) by mouth once daily. 4/18/18   Historical Provider, MD   aspirin 81 mg EC tablet Take 1 tablet (81 mg) by mouth once daily. 12/10/13   Historical Provider, MD   atenolol (Tenormin) 100 mg tablet Take 1 tablet (100 mg) by mouth once daily.    Historical Provider, MD   cholecalciferol (Vitamin D-3) 50 mcg (2,000 unit) capsule Take 1 capsule (50 mcg) by mouth once  daily.    Historical Provider, MD   cyanocobalamin, vitamin B-12, (VITAMIN B-12 ORAL) Take by mouth.    Historical Provider, MD   escitalopram (Lexapro) 10 mg tablet Take 1 tablet (10 mg) by mouth once daily. 11/1/23   Renetta SHELLEY DO   ezetimibe (Zetia) 10 mg tablet Take 1 tablet (10 mg) by mouth once daily. 5/12/23   Geraldine Downey MD   hydrOXYzine HCL (Atarax) 25 mg tablet Take 1 tablet (25 mg) by mouth every 6 hours if needed. 1/8/15   Historical Provider, MD   lisinopril 10 mg tablet Take 1 tablet (10 mg) by mouth once daily.    Historical Provider, MD   pantoprazole (ProtoNix) 40 mg EC tablet Take 1 tablet (40 mg) by mouth once daily. 8/11/23   Geraldine Downey MD   rosuvastatin (Crestor) 20 mg tablet Take 1 tablet (20 mg) by mouth once daily. 4/26/23   Geraldine Downey MD   vitamin E 180 mg (400 unit) capsule Take 1 capsule (400 Units) by mouth.    Historical Provider, MD        PAT ROS:   Constitutional:   neg    Neuro/Psych:   neg    Eyes:   neg    Ears:   neg    Nose:   neg    Mouth:   neg    Throat:   neg    Neck:   neg    Cardio:   neg    Respiratory:   neg    Endocrine:   neg    GI:   neg    :   neg    Musculoskeletal:   neg    Hematologic:   neg    Skin:  neg        Physical Exam  Vitals reviewed.   Constitutional:       Appearance: Normal appearance.   HENT:      Head: Normocephalic.      Mouth/Throat:      Mouth: Mucous membranes are dry.   Eyes:      Conjunctiva/sclera: Conjunctivae normal.   Neck:      Vascular: No carotid bruit.   Cardiovascular:      Rate and Rhythm: Normal rate and regular rhythm.      Pulses: Normal pulses.      Heart sounds: Normal heart sounds.   Pulmonary:      Effort: Pulmonary effort is normal.      Breath sounds: Normal breath sounds.   Abdominal:      Palpations: Abdomen is soft.      Tenderness: There is no abdominal tenderness.   Musculoskeletal:         General: Normal range of motion.      Cervical back: Normal range of motion.      Right lower leg: No edema.       Left lower leg: No edema.   Lymphadenopathy:      Cervical: No cervical adenopathy.   Skin:     General: Skin is warm and dry.      Capillary Refill: Capillary refill takes less than 2 seconds.   Neurological:      General: No focal deficit present.      Mental Status: She is alert and oriented to person, place, and time.   Psychiatric:         Mood and Affect: Mood normal.         Behavior: Behavior normal.         Thought Content: Thought content normal.         Judgment: Judgment normal.          PAT AIRWAY:   Airway:     Mallampati::  III    Neck ROM::  Full  normal        Visit Vitals  /61   Pulse 60   Temp 37.4 °C (99.3 °F)       DASI Risk Score      Flowsheet Row Most Recent Value   DASI SCORE 18.95   METS Score (Will be calculated only when all the questions are answered) 5.1          Caprini DVT Assessment      Flowsheet Row Most Recent Value   DVT Score 13   Current Status Major surgery planned, lasting over 3 hours   History Previous malignancy, Prior major surgery   Age 60-75 years   BMI 31-40 (Obesity)          Modified Frailty Index      Flowsheet Row Most Recent Value   Modified Frailty Index Calculator .1818          CHADS2 Stroke Risk  Current as of 4 hours ago        N/A 3 - 100%: High Risk   2 - 3%: Medium Risk   0 - 2%: Low Risk     Last Change: N/A          This score determines the patient's risk of having a stroke if the patient has atrial fibrillation.        This score is not applicable to this patient. Components are not calculated.          Revised Cardiac Risk Index      Flowsheet Row Most Recent Value   Revised Cardiac Risk Calculator 2          Apfel Simplified Score      Flowsheet Row Most Recent Value   Apfel Simplified Score Calculator 3          Risk Analysis Index Results This Encounter    No data found in the last 1 encounters.       Stop Bang Score      Flowsheet Row Most Recent Value   Do you snore loudly? 1   Do you often feel tired or fatigued after your sleep? 1   Has  anyone ever observed you stop breathing in your sleep? 0   Do you have or are you being treated for high blood pressure? 1   Recent BMI (Calculated) 29.5   Is BMI greater than 35 kg/m2? 0=No   Age older than 50 years old? 1=Yes   Is your neck circumference greater than 17 inches (Male) or 16 inches (Female)? 0   Gender - Male 0=No   STOP-BANG Total Score 4          Assessment and Plan:   Neuro:  anxiety and depression managed with medications.  Patient considering grief counseling due to the loss of her .  RLS no currently on medication.  Preoperative brain exercise educational hand out provided to patient.    No neurologic diagnoses, however, the patient is at an increased risk for post operative delirium secondary to age > 65, depression    The patient is at an increased risk for perioperative stroke secondary to cardiac disease, increased age, HTN, HLD, female sex and general anesthesia with op time >2.5 hours.    HEENT/Airway:  deviated nasal septum no interventions.    Cardiovascular:  HTN and HLD well controlled with diet and medications. Coronary artery disease with a bare-metal stent to the RCA in 2003 managed on aspirin.  SSS managed with permanent pacemaker- last device check/interrogation 10/16/23 noted in cardiology tab or chart review tab.  Cards Dr. James Ramicone last visit 05/15/23 follow up annually. EP Dr. Nancy Chavarria last visit 05/03/23 follow up q6 months, next appointment scheduled for 11/28/23.  EKG 08/25/23 v-paced rhythm, echo 05/16/22 EF 55-60% with no valvular abnormalities.  Stress test 05/05/21 negative for ischemia.  No additional preoperative testing is currently indicated.    METS are 5.1    RCRI  2 which is  10.1 % 30 day risk of MACE (risk for cardiac death, nonfatal myocardial infarction, and nonfactal cardiac arrest    DEE score which indicates a 0.2% risk of intraoperative or 30-day postoperative MACE      Pulmonary:  TRINA compliant with CPAP. Preoperative brain exercise  educational handout provided to patient.     ARISCAT:  19 points which is a low risk of in-hospital post-op pulmonary complications     PRODIGY:  17  points which is a highest risk of post op opioid induced respiratory depression episodes    STOP BAN points which is an intermediate risk for moderate to severe TRINA    Renal: No diagnosis or significant findings on chart review or clinical presentation and evaluation, however, the patient is at increased risk of perioperative renal complications secondary to age>56, HTN and intraperitoneal surgery. Preventative measures include preoperative hydration    Endocrine:  No diagnosis or significant findings on chart review or clinical presentation and evaluation.     Hematologic:  managed on 81mg daily aspirin for CAD with stent- patient will remain on this throughout the surgery.  Preoperative DVT educational handout provided to patient.    Caprini Score:  13  points which is a highest  risk of perioperative VTE    Gastrointestinal:   fatty liver and GERD managed with diet and medication.    EAT-10 score of 0 - self-perceived oropharyngeal dysphagia scale (0-40)     Apfel:  3  points 61% risk for post operative N/V    Infectious disease:  remote history of necrotizing fascitis. No current interventions.     Musculoskeletal:  No diagnosis or significant findings on chart review or clinical presentation and evaluation.      Other:   fibroid uterus scheduled for surgery.  Breast cancer managed on anastrozole.     Labs ordered  Results for orders placed or performed in visit on 11/10/23 (from the past 96 hour(s))   CBC   Result Value Ref Range    WBC 6.6 4.4 - 11.3 x10*3/uL    nRBC 0.0 0.0 - 0.0 /100 WBCs    RBC 4.62 4.00 - 5.20 x10*6/uL    Hemoglobin 15.1 12.0 - 16.0 g/dL    Hematocrit 45.4 36.0 - 46.0 %    MCV 98 80 - 100 fL    MCH 32.7 26.0 - 34.0 pg    MCHC 33.3 32.0 - 36.0 g/dL    RDW 14.0 11.5 - 14.5 %    Platelets 181 150 - 450 x10*3/uL   Basic Metabolic Panel    Result Value Ref Range    Glucose 130 (H) 74 - 99 mg/dL    Sodium 142 136 - 145 mmol/L    Potassium 4.2 3.5 - 5.3 mmol/L    Chloride 108 (H) 98 - 107 mmol/L    Bicarbonate 24 21 - 32 mmol/L    Anion Gap 14 10 - 20 mmol/L    Urea Nitrogen 17 6 - 23 mg/dL    Creatinine 0.82 0.50 - 1.05 mg/dL    eGFR 75 >60 mL/min/1.73m*2    Calcium 9.7 8.6 - 10.6 mg/dL   Type And Screen   Result Value Ref Range    ABO TYPE O     Rh TYPE POS     ANTIBODY SCREEN NEG

## 2023-11-15 DIAGNOSIS — E78.00 HYPERCHOLESTEROLEMIA: ICD-10-CM

## 2023-11-15 RX ORDER — EZETIMIBE 10 MG/1
10 TABLET ORAL DAILY
Qty: 90 TABLET | Refills: 0 | OUTPATIENT
Start: 2023-11-15

## 2023-11-17 RX ORDER — ROSUVASTATIN CALCIUM 20 MG/1
20 TABLET, COATED ORAL
Qty: 90 TABLET | Refills: 0 | Status: SHIPPED | OUTPATIENT
Start: 2023-11-17 | End: 2024-02-19 | Stop reason: SDUPTHER

## 2023-11-17 NOTE — TELEPHONE ENCOUNTER
I advised Darline to get refill from cardiologist, however she is out of medicine and is canceling that OV due to having a hysterectomy next week. Asking to please refill and she will find a new PCP.

## 2023-11-19 ENCOUNTER — ANESTHESIA EVENT (OUTPATIENT)
Dept: OPERATING ROOM | Facility: HOSPITAL | Age: 73
End: 2023-11-19
Payer: MEDICARE

## 2023-11-20 ENCOUNTER — HOSPITAL ENCOUNTER (OUTPATIENT)
Facility: HOSPITAL | Age: 73
Discharge: HOME | End: 2023-11-21
Attending: STUDENT IN AN ORGANIZED HEALTH CARE EDUCATION/TRAINING PROGRAM | Admitting: STUDENT IN AN ORGANIZED HEALTH CARE EDUCATION/TRAINING PROGRAM
Payer: MEDICARE

## 2023-11-20 ENCOUNTER — ANESTHESIA (OUTPATIENT)
Dept: OPERATING ROOM | Facility: HOSPITAL | Age: 73
End: 2023-11-20
Payer: MEDICARE

## 2023-11-20 DIAGNOSIS — I49.5 SICK SINUS SYNDROME DUE TO SA NODE DYSFUNCTION (MULTI): Primary | ICD-10-CM

## 2023-11-20 DIAGNOSIS — R19.00 INTRA-ABDOMINAL AND PELVIC SWELLING, MASS AND LUMP, UNSPECIFIED SITE: ICD-10-CM

## 2023-11-20 DIAGNOSIS — Z98.890 STATUS POST SURGERY: ICD-10-CM

## 2023-11-20 DIAGNOSIS — G89.18 POSTOPERATIVE PAIN: ICD-10-CM

## 2023-11-20 DIAGNOSIS — D21.9 FIBROID: ICD-10-CM

## 2023-11-20 LAB
ABO GROUP (TYPE) IN BLOOD: NORMAL
RH FACTOR (ANTIGEN D): NORMAL

## 2023-11-20 PROCEDURE — 88307 TISSUE EXAM BY PATHOLOGIST: CPT | Performed by: PATHOLOGY

## 2023-11-20 PROCEDURE — 58573 TLH W/T/O UTERUS OVER 250 G: CPT | Performed by: STUDENT IN AN ORGANIZED HEALTH CARE EDUCATION/TRAINING PROGRAM

## 2023-11-20 PROCEDURE — A58571 PR LAPAROSCOPY W TOT HYSTERECTUTERUS <=250 GRAM  W TUBE/OVARY: Performed by: ANESTHESIOLOGY

## 2023-11-20 PROCEDURE — 96372 THER/PROPH/DIAG INJ SC/IM: CPT

## 2023-11-20 PROCEDURE — 2720000007 HC OR 272 NO HCPCS: Performed by: STUDENT IN AN ORGANIZED HEALTH CARE EDUCATION/TRAINING PROGRAM

## 2023-11-20 PROCEDURE — 99223 1ST HOSP IP/OBS HIGH 75: CPT | Performed by: STUDENT IN AN ORGANIZED HEALTH CARE EDUCATION/TRAINING PROGRAM

## 2023-11-20 PROCEDURE — 36415 COLL VENOUS BLD VENIPUNCTURE: CPT

## 2023-11-20 PROCEDURE — 2500000005 HC RX 250 GENERAL PHARMACY W/O HCPCS

## 2023-11-20 PROCEDURE — 99100 ANES PT EXTEME AGE<1 YR&>70: CPT | Performed by: ANESTHESIOLOGY

## 2023-11-20 PROCEDURE — 2500000001 HC RX 250 WO HCPCS SELF ADMINISTERED DRUGS (ALT 637 FOR MEDICARE OP): Performed by: STUDENT IN AN ORGANIZED HEALTH CARE EDUCATION/TRAINING PROGRAM

## 2023-11-20 PROCEDURE — 2500000004 HC RX 250 GENERAL PHARMACY W/ HCPCS (ALT 636 FOR OP/ED)

## 2023-11-20 PROCEDURE — 3600000009 HC OR TIME - EACH INCREMENTAL 1 MINUTE - PROCEDURE LEVEL FOUR: Performed by: STUDENT IN AN ORGANIZED HEALTH CARE EDUCATION/TRAINING PROGRAM

## 2023-11-20 PROCEDURE — G0378 HOSPITAL OBSERVATION PER HR: HCPCS

## 2023-11-20 PROCEDURE — 2500000004 HC RX 250 GENERAL PHARMACY W/ HCPCS (ALT 636 FOR OP/ED): Performed by: STUDENT IN AN ORGANIZED HEALTH CARE EDUCATION/TRAINING PROGRAM

## 2023-11-20 PROCEDURE — 7100000001 HC RECOVERY ROOM TIME - INITIAL BASE CHARGE: Performed by: STUDENT IN AN ORGANIZED HEALTH CARE EDUCATION/TRAINING PROGRAM

## 2023-11-20 PROCEDURE — 3600000004 HC OR TIME - INITIAL BASE CHARGE - PROCEDURE LEVEL FOUR: Performed by: STUDENT IN AN ORGANIZED HEALTH CARE EDUCATION/TRAINING PROGRAM

## 2023-11-20 PROCEDURE — 3700000002 HC GENERAL ANESTHESIA TIME - EACH INCREMENTAL 1 MINUTE: Performed by: STUDENT IN AN ORGANIZED HEALTH CARE EDUCATION/TRAINING PROGRAM

## 2023-11-20 PROCEDURE — 2500000004 HC RX 250 GENERAL PHARMACY W/ HCPCS (ALT 636 FOR OP/ED): Performed by: ANESTHESIOLOGY

## 2023-11-20 PROCEDURE — 88311 DECALCIFY TISSUE: CPT | Performed by: PATHOLOGY

## 2023-11-20 PROCEDURE — 3700000001 HC GENERAL ANESTHESIA TIME - INITIAL BASE CHARGE: Performed by: STUDENT IN AN ORGANIZED HEALTH CARE EDUCATION/TRAINING PROGRAM

## 2023-11-20 PROCEDURE — 88307 TISSUE EXAM BY PATHOLOGIST: CPT | Mod: TC,SUR | Performed by: STUDENT IN AN ORGANIZED HEALTH CARE EDUCATION/TRAINING PROGRAM

## 2023-11-20 PROCEDURE — A4217 STERILE WATER/SALINE, 500 ML: HCPCS | Performed by: STUDENT IN AN ORGANIZED HEALTH CARE EDUCATION/TRAINING PROGRAM

## 2023-11-20 PROCEDURE — 7100000002 HC RECOVERY ROOM TIME - EACH INCREMENTAL 1 MINUTE: Performed by: STUDENT IN AN ORGANIZED HEALTH CARE EDUCATION/TRAINING PROGRAM

## 2023-11-20 RX ORDER — HYDRALAZINE HYDROCHLORIDE 20 MG/ML
INJECTION INTRAMUSCULAR; INTRAVENOUS AS NEEDED
Status: DISCONTINUED | OUTPATIENT
Start: 2023-11-20 | End: 2023-11-20

## 2023-11-20 RX ORDER — HEPARIN SODIUM 5000 [USP'U]/ML
INJECTION, SOLUTION INTRAVENOUS; SUBCUTANEOUS AS NEEDED
Status: DISCONTINUED | OUTPATIENT
Start: 2023-11-20 | End: 2023-11-20

## 2023-11-20 RX ORDER — ROSUVASTATIN CALCIUM 20 MG/1
20 TABLET, COATED ORAL
Status: DISCONTINUED | OUTPATIENT
Start: 2023-11-21 | End: 2023-11-21 | Stop reason: HOSPADM

## 2023-11-20 RX ORDER — DEXTROSE 50 % IN WATER (D50W) INTRAVENOUS SYRINGE
25
Status: DISCONTINUED | OUTPATIENT
Start: 2023-11-20 | End: 2023-11-21 | Stop reason: HOSPADM

## 2023-11-20 RX ORDER — ATENOLOL 100 MG/1
100 TABLET ORAL
Status: DISCONTINUED | OUTPATIENT
Start: 2023-11-20 | End: 2023-11-21 | Stop reason: HOSPADM

## 2023-11-20 RX ORDER — SODIUM CHLORIDE, SODIUM LACTATE, POTASSIUM CHLORIDE, CALCIUM CHLORIDE 600; 310; 30; 20 MG/100ML; MG/100ML; MG/100ML; MG/100ML
INJECTION, SOLUTION INTRAVENOUS CONTINUOUS PRN
Status: DISCONTINUED | OUTPATIENT
Start: 2023-11-20 | End: 2023-11-20

## 2023-11-20 RX ORDER — METHOCARBAMOL 100 MG/ML
1000 INJECTION, SOLUTION INTRAMUSCULAR; INTRAVENOUS ONCE
Status: COMPLETED | OUTPATIENT
Start: 2023-11-20 | End: 2023-11-20

## 2023-11-20 RX ORDER — NALOXONE HYDROCHLORIDE 0.4 MG/ML
0.1 INJECTION, SOLUTION INTRAMUSCULAR; INTRAVENOUS; SUBCUTANEOUS EVERY 5 MIN PRN
Status: DISCONTINUED | OUTPATIENT
Start: 2023-11-20 | End: 2023-11-21 | Stop reason: HOSPADM

## 2023-11-20 RX ORDER — OXYCODONE HYDROCHLORIDE 5 MG/1
2.5 TABLET ORAL EVERY 4 HOURS PRN
Status: DISCONTINUED | OUTPATIENT
Start: 2023-11-20 | End: 2023-11-20 | Stop reason: HOSPADM

## 2023-11-20 RX ORDER — SIMETHICONE 80 MG
80 TABLET,CHEWABLE ORAL 4 TIMES DAILY PRN
Status: DISCONTINUED | OUTPATIENT
Start: 2023-11-20 | End: 2023-11-21 | Stop reason: HOSPADM

## 2023-11-20 RX ORDER — CEFAZOLIN 1 G/1
INJECTION, POWDER, FOR SOLUTION INTRAVENOUS AS NEEDED
Status: DISCONTINUED | OUTPATIENT
Start: 2023-11-20 | End: 2023-11-20

## 2023-11-20 RX ORDER — DEXMEDETOMIDINE HYDROCHLORIDE 4 UG/ML
INJECTION, SOLUTION INTRAVENOUS CONTINUOUS PRN
Status: DISCONTINUED | OUTPATIENT
Start: 2023-11-20 | End: 2023-11-20

## 2023-11-20 RX ORDER — IBUPROFEN 600 MG/1
600 TABLET ORAL EVERY 6 HOURS
Status: DISCONTINUED | OUTPATIENT
Start: 2023-11-21 | End: 2023-11-21 | Stop reason: HOSPADM

## 2023-11-20 RX ORDER — METRONIDAZOLE 500 MG/100ML
INJECTION, SOLUTION INTRAVENOUS AS NEEDED
Status: DISCONTINUED | OUTPATIENT
Start: 2023-11-20 | End: 2023-11-20

## 2023-11-20 RX ORDER — ATENOLOL 100 MG/1
100 TABLET ORAL
Status: DISCONTINUED | OUTPATIENT
Start: 2023-11-21 | End: 2023-11-20

## 2023-11-20 RX ORDER — ONDANSETRON HYDROCHLORIDE 2 MG/ML
4 INJECTION, SOLUTION INTRAVENOUS EVERY 6 HOURS PRN
Status: DISCONTINUED | OUTPATIENT
Start: 2023-11-20 | End: 2023-11-21 | Stop reason: HOSPADM

## 2023-11-20 RX ORDER — SODIUM CHLORIDE, SODIUM LACTATE, POTASSIUM CHLORIDE, CALCIUM CHLORIDE 600; 310; 30; 20 MG/100ML; MG/100ML; MG/100ML; MG/100ML
40 INJECTION, SOLUTION INTRAVENOUS CONTINUOUS
Status: DISCONTINUED | OUTPATIENT
Start: 2023-11-20 | End: 2023-11-21 | Stop reason: HOSPADM

## 2023-11-20 RX ORDER — OXYCODONE HYDROCHLORIDE 5 MG/1
5 TABLET ORAL EVERY 4 HOURS PRN
Status: DISCONTINUED | OUTPATIENT
Start: 2023-11-20 | End: 2023-11-20 | Stop reason: HOSPADM

## 2023-11-20 RX ORDER — KETOROLAC TROMETHAMINE 15 MG/ML
15 INJECTION, SOLUTION INTRAMUSCULAR; INTRAVENOUS EVERY 6 HOURS
Status: DISCONTINUED | OUTPATIENT
Start: 2023-11-20 | End: 2023-11-21 | Stop reason: HOSPADM

## 2023-11-20 RX ORDER — HYDROMORPHONE HYDROCHLORIDE 1 MG/ML
0.4 INJECTION, SOLUTION INTRAMUSCULAR; INTRAVENOUS; SUBCUTANEOUS EVERY 5 MIN PRN
Status: DISCONTINUED | OUTPATIENT
Start: 2023-11-20 | End: 2023-11-20 | Stop reason: HOSPADM

## 2023-11-20 RX ORDER — ONDANSETRON HYDROCHLORIDE 2 MG/ML
4 INJECTION, SOLUTION INTRAVENOUS ONCE AS NEEDED
Status: DISCONTINUED | OUTPATIENT
Start: 2023-11-20 | End: 2023-11-20 | Stop reason: HOSPADM

## 2023-11-20 RX ORDER — SODIUM CHLORIDE 0.9 G/100ML
IRRIGANT IRRIGATION AS NEEDED
Status: DISCONTINUED | OUTPATIENT
Start: 2023-11-20 | End: 2023-11-20 | Stop reason: HOSPADM

## 2023-11-20 RX ORDER — WATER 1 ML/ML
IRRIGANT IRRIGATION AS NEEDED
Status: DISCONTINUED | OUTPATIENT
Start: 2023-11-20 | End: 2023-11-20 | Stop reason: HOSPADM

## 2023-11-20 RX ORDER — DEXTROSE MONOHYDRATE 100 MG/ML
0.3 INJECTION, SOLUTION INTRAVENOUS ONCE AS NEEDED
Status: DISCONTINUED | OUTPATIENT
Start: 2023-11-20 | End: 2023-11-21 | Stop reason: HOSPADM

## 2023-11-20 RX ORDER — LISINOPRIL 10 MG/1
10 TABLET ORAL
Status: DISCONTINUED | OUTPATIENT
Start: 2023-11-21 | End: 2023-11-21 | Stop reason: HOSPADM

## 2023-11-20 RX ORDER — PANTOPRAZOLE SODIUM 40 MG/1
40 TABLET, DELAYED RELEASE ORAL DAILY
Status: DISCONTINUED | OUTPATIENT
Start: 2023-11-21 | End: 2023-11-21 | Stop reason: HOSPADM

## 2023-11-20 RX ORDER — LIDOCAINE HYDROCHLORIDE 20 MG/ML
INJECTION, SOLUTION INFILTRATION; PERINEURAL AS NEEDED
Status: DISCONTINUED | OUTPATIENT
Start: 2023-11-20 | End: 2023-11-20

## 2023-11-20 RX ORDER — ENOXAPARIN SODIUM 100 MG/ML
40 INJECTION SUBCUTANEOUS EVERY 24 HOURS
Status: DISCONTINUED | OUTPATIENT
Start: 2023-11-21 | End: 2023-11-21 | Stop reason: HOSPADM

## 2023-11-20 RX ORDER — SODIUM CHLORIDE, SODIUM LACTATE, POTASSIUM CHLORIDE, CALCIUM CHLORIDE 600; 310; 30; 20 MG/100ML; MG/100ML; MG/100ML; MG/100ML
100 INJECTION, SOLUTION INTRAVENOUS CONTINUOUS
Status: DISCONTINUED | OUTPATIENT
Start: 2023-11-20 | End: 2023-11-20 | Stop reason: HOSPADM

## 2023-11-20 RX ORDER — HYDROXYZINE HYDROCHLORIDE 25 MG/1
25 TABLET, FILM COATED ORAL EVERY 6 HOURS PRN
Status: DISCONTINUED | OUTPATIENT
Start: 2023-11-20 | End: 2023-11-21 | Stop reason: HOSPADM

## 2023-11-20 RX ORDER — ONDANSETRON HYDROCHLORIDE 2 MG/ML
INJECTION, SOLUTION INTRAVENOUS AS NEEDED
Status: DISCONTINUED | OUTPATIENT
Start: 2023-11-20 | End: 2023-11-20

## 2023-11-20 RX ORDER — MIDAZOLAM HYDROCHLORIDE 1 MG/ML
INJECTION, SOLUTION INTRAMUSCULAR; INTRAVENOUS AS NEEDED
Status: DISCONTINUED | OUTPATIENT
Start: 2023-11-20 | End: 2023-11-20

## 2023-11-20 RX ORDER — FENTANYL CITRATE 50 UG/ML
INJECTION, SOLUTION INTRAMUSCULAR; INTRAVENOUS AS NEEDED
Status: DISCONTINUED | OUTPATIENT
Start: 2023-11-20 | End: 2023-11-20

## 2023-11-20 RX ORDER — EZETIMIBE 10 MG/1
10 TABLET ORAL DAILY
Status: DISCONTINUED | OUTPATIENT
Start: 2023-11-21 | End: 2023-11-21 | Stop reason: HOSPADM

## 2023-11-20 RX ORDER — DEXAMETHASONE SODIUM PHOSPHATE 4 MG/ML
INJECTION, SOLUTION INTRA-ARTICULAR; INTRALESIONAL; INTRAMUSCULAR; INTRAVENOUS; SOFT TISSUE AS NEEDED
Status: DISCONTINUED | OUTPATIENT
Start: 2023-11-20 | End: 2023-11-20

## 2023-11-20 RX ORDER — HYDRALAZINE HYDROCHLORIDE 20 MG/ML
5 INJECTION INTRAMUSCULAR; INTRAVENOUS EVERY 30 MIN PRN
Status: DISCONTINUED | OUTPATIENT
Start: 2023-11-20 | End: 2023-11-20 | Stop reason: HOSPADM

## 2023-11-20 RX ORDER — LIDOCAINE IN NACL,ISO-OSMOT/PF 30 MG/3 ML
0.1 SYRINGE (ML) INJECTION ONCE
Status: DISCONTINUED | OUTPATIENT
Start: 2023-11-20 | End: 2023-11-20 | Stop reason: HOSPADM

## 2023-11-20 RX ORDER — HYDROMORPHONE HYDROCHLORIDE 1 MG/ML
INJECTION, SOLUTION INTRAMUSCULAR; INTRAVENOUS; SUBCUTANEOUS AS NEEDED
Status: DISCONTINUED | OUTPATIENT
Start: 2023-11-20 | End: 2023-11-20

## 2023-11-20 RX ORDER — ALBUTEROL SULFATE 0.83 MG/ML
2.5 SOLUTION RESPIRATORY (INHALATION) ONCE AS NEEDED
Status: DISCONTINUED | OUTPATIENT
Start: 2023-11-20 | End: 2023-11-20 | Stop reason: HOSPADM

## 2023-11-20 RX ORDER — HYDROMORPHONE HYDROCHLORIDE 1 MG/ML
0.4 INJECTION, SOLUTION INTRAMUSCULAR; INTRAVENOUS; SUBCUTANEOUS
Status: DISCONTINUED | OUTPATIENT
Start: 2023-11-20 | End: 2023-11-21 | Stop reason: HOSPADM

## 2023-11-20 RX ORDER — OXYCODONE HYDROCHLORIDE 5 MG/1
5 TABLET ORAL EVERY 4 HOURS PRN
Status: DISCONTINUED | OUTPATIENT
Start: 2023-11-20 | End: 2023-11-21 | Stop reason: HOSPADM

## 2023-11-20 RX ORDER — ROCURONIUM BROMIDE 10 MG/ML
INJECTION, SOLUTION INTRAVENOUS AS NEEDED
Status: DISCONTINUED | OUTPATIENT
Start: 2023-11-20 | End: 2023-11-20

## 2023-11-20 RX ORDER — HYDROMORPHONE HYDROCHLORIDE 1 MG/ML
0.2 INJECTION, SOLUTION INTRAMUSCULAR; INTRAVENOUS; SUBCUTANEOUS EVERY 5 MIN PRN
Status: DISCONTINUED | OUTPATIENT
Start: 2023-11-20 | End: 2023-11-20 | Stop reason: HOSPADM

## 2023-11-20 RX ORDER — LABETALOL HYDROCHLORIDE 5 MG/ML
INJECTION, SOLUTION INTRAVENOUS AS NEEDED
Status: DISCONTINUED | OUTPATIENT
Start: 2023-11-20 | End: 2023-11-20

## 2023-11-20 RX ORDER — ESCITALOPRAM OXALATE 10 MG/1
10 TABLET ORAL DAILY
Status: DISCONTINUED | OUTPATIENT
Start: 2023-11-21 | End: 2023-11-21 | Stop reason: HOSPADM

## 2023-11-20 RX ORDER — AMOXICILLIN 250 MG
2 CAPSULE ORAL 2 TIMES DAILY
Status: DISCONTINUED | OUTPATIENT
Start: 2023-11-20 | End: 2023-11-21 | Stop reason: HOSPADM

## 2023-11-20 RX ORDER — ASPIRIN 81 MG/1
81 TABLET ORAL
Status: DISCONTINUED | OUTPATIENT
Start: 2023-11-21 | End: 2023-11-21 | Stop reason: HOSPADM

## 2023-11-20 RX ORDER — PROPOFOL 10 MG/ML
INJECTION, EMULSION INTRAVENOUS AS NEEDED
Status: DISCONTINUED | OUTPATIENT
Start: 2023-11-20 | End: 2023-11-20

## 2023-11-20 RX ORDER — POLYETHYLENE GLYCOL 3350 17 G/17G
17 POWDER, FOR SOLUTION ORAL DAILY PRN
Status: DISCONTINUED | OUTPATIENT
Start: 2023-11-20 | End: 2023-11-21 | Stop reason: HOSPADM

## 2023-11-20 RX ADMIN — SUGAMMADEX 400 MG: 100 INJECTION, SOLUTION INTRAVENOUS at 14:51

## 2023-11-20 RX ADMIN — PROPOFOL 20 MG: 10 INJECTION, EMULSION INTRAVENOUS at 12:11

## 2023-11-20 RX ADMIN — METHOCARBAMOL 1000 MG: 1000 INJECTION, SOLUTION INTRAMUSCULAR; INTRAVENOUS at 16:56

## 2023-11-20 RX ADMIN — LABETALOL HYDROCHLORIDE 15 MG: 5 INJECTION, SOLUTION INTRAVENOUS at 14:54

## 2023-11-20 RX ADMIN — SENNOSIDES AND DOCUSATE SODIUM 2 TABLET: 8.6; 5 TABLET ORAL at 20:30

## 2023-11-20 RX ADMIN — HYDRALAZINE HYDROCHLORIDE 5 MG: 20 INJECTION INTRAMUSCULAR; INTRAVENOUS at 15:42

## 2023-11-20 RX ADMIN — PROPOFOL 30 MG: 10 INJECTION, EMULSION INTRAVENOUS at 13:11

## 2023-11-20 RX ADMIN — MIDAZOLAM 2 MG: 1 INJECTION INTRAMUSCULAR; INTRAVENOUS at 11:15

## 2023-11-20 RX ADMIN — Medication 3 L/MIN: at 16:10

## 2023-11-20 RX ADMIN — ROCURONIUM BROMIDE 10 MG: 50 INJECTION, SOLUTION INTRAVENOUS at 13:49

## 2023-11-20 RX ADMIN — HYDROMORPHONE HYDROCHLORIDE 0.4 MG: 1 INJECTION, SOLUTION INTRAMUSCULAR; INTRAVENOUS; SUBCUTANEOUS at 16:11

## 2023-11-20 RX ADMIN — PROPOFOL 50 MG: 10 INJECTION, EMULSION INTRAVENOUS at 12:40

## 2023-11-20 RX ADMIN — SODIUM CHLORIDE, POTASSIUM CHLORIDE, SODIUM LACTATE AND CALCIUM CHLORIDE 40 ML/HR: 600; 310; 30; 20 INJECTION, SOLUTION INTRAVENOUS at 20:20

## 2023-11-20 RX ADMIN — DEXMEDETOMIDINE HYDROCHLORIDE 0.2 MCG/KG/HR: 400 INJECTION INTRAVENOUS at 13:34

## 2023-11-20 RX ADMIN — PROPOFOL 120 MG: 10 INJECTION, EMULSION INTRAVENOUS at 12:10

## 2023-11-20 RX ADMIN — HYDROMORPHONE HYDROCHLORIDE 0.6 MG: 1 INJECTION, SOLUTION INTRAMUSCULAR; INTRAVENOUS; SUBCUTANEOUS at 13:42

## 2023-11-20 RX ADMIN — CEFAZOLIN 2 G: 330 INJECTION, POWDER, FOR SOLUTION INTRAMUSCULAR; INTRAVENOUS at 12:21

## 2023-11-20 RX ADMIN — ONDANSETRON 4 MG: 2 INJECTION, SOLUTION INTRAMUSCULAR; INTRAVENOUS at 14:34

## 2023-11-20 RX ADMIN — PROPOFOL 30 MG: 10 INJECTION, EMULSION INTRAVENOUS at 12:13

## 2023-11-20 RX ADMIN — KETOROLAC TROMETHAMINE 15 MG: 15 INJECTION, SOLUTION INTRAMUSCULAR; INTRAVENOUS at 20:31

## 2023-11-20 RX ADMIN — FENTANYL CITRATE 50 MCG: 50 INJECTION, SOLUTION INTRAMUSCULAR; INTRAVENOUS at 12:13

## 2023-11-20 RX ADMIN — METRONIDAZOLE 500 MG: 500 INJECTION, SOLUTION INTRAVENOUS at 12:21

## 2023-11-20 RX ADMIN — DEXAMETHASONE SODIUM PHOSPHATE 4 MG: 4 INJECTION, SOLUTION INTRAMUSCULAR; INTRAVENOUS at 12:16

## 2023-11-20 RX ADMIN — ROCURONIUM BROMIDE 10 MG: 50 INJECTION, SOLUTION INTRAVENOUS at 13:30

## 2023-11-20 RX ADMIN — OXYCODONE HYDROCHLORIDE 5 MG: 5 TABLET ORAL at 23:47

## 2023-11-20 RX ADMIN — LIDOCAINE HYDROCHLORIDE 100 MG: 20 INJECTION, SOLUTION INFILTRATION; PERINEURAL at 12:10

## 2023-11-20 RX ADMIN — ROCURONIUM BROMIDE 20 MG: 50 INJECTION, SOLUTION INTRAVENOUS at 14:25

## 2023-11-20 RX ADMIN — HEPARIN SODIUM 5000 UNITS: 5000 INJECTION INTRAVENOUS; SUBCUTANEOUS at 12:26

## 2023-11-20 RX ADMIN — ROCURONIUM BROMIDE 10 MG: 50 INJECTION, SOLUTION INTRAVENOUS at 14:06

## 2023-11-20 RX ADMIN — FENTANYL CITRATE 50 MCG: 50 INJECTION, SOLUTION INTRAMUSCULAR; INTRAVENOUS at 12:10

## 2023-11-20 RX ADMIN — HYDRALAZINE HYDROCHLORIDE 5 MG: 20 INJECTION INTRAMUSCULAR; INTRAVENOUS at 12:22

## 2023-11-20 RX ADMIN — HYDROMORPHONE HYDROCHLORIDE 0.2 MG: 1 INJECTION, SOLUTION INTRAMUSCULAR; INTRAVENOUS; SUBCUTANEOUS at 16:31

## 2023-11-20 RX ADMIN — ROCURONIUM BROMIDE 50 MG: 50 INJECTION, SOLUTION INTRAVENOUS at 12:12

## 2023-11-20 RX ADMIN — ROCURONIUM BROMIDE 10 MG: 50 INJECTION, SOLUTION INTRAVENOUS at 12:40

## 2023-11-20 RX ADMIN — ROCURONIUM BROMIDE 10 MG: 50 INJECTION, SOLUTION INTRAVENOUS at 13:04

## 2023-11-20 RX ADMIN — HYDROMORPHONE HYDROCHLORIDE 0.4 MG: 1 INJECTION, SOLUTION INTRAMUSCULAR; INTRAVENOUS; SUBCUTANEOUS at 15:49

## 2023-11-20 RX ADMIN — SODIUM CHLORIDE, POTASSIUM CHLORIDE, SODIUM LACTATE AND CALCIUM CHLORIDE: 600; 310; 30; 20 INJECTION, SOLUTION INTRAVENOUS at 11:37

## 2023-11-20 RX ADMIN — PROPOFOL 40 MG: 10 INJECTION, EMULSION INTRAVENOUS at 13:40

## 2023-11-20 RX ADMIN — PROPOFOL 30 MG: 10 INJECTION, EMULSION INTRAVENOUS at 13:04

## 2023-11-20 RX ADMIN — SODIUM CHLORIDE, POTASSIUM CHLORIDE, SODIUM LACTATE AND CALCIUM CHLORIDE 100 ML/HR: 600; 310; 30; 20 INJECTION, SOLUTION INTRAVENOUS at 15:33

## 2023-11-20 RX ADMIN — PROPOFOL 30 MG: 10 INJECTION, EMULSION INTRAVENOUS at 12:14

## 2023-11-20 SDOH — SOCIAL STABILITY: SOCIAL INSECURITY: ARE THERE ANY APPARENT SIGNS OF INJURIES/BEHAVIORS THAT COULD BE RELATED TO ABUSE/NEGLECT?: NO

## 2023-11-20 SDOH — SOCIAL STABILITY: SOCIAL INSECURITY: WERE YOU ABLE TO COMPLETE ALL THE BEHAVIORAL HEALTH SCREENINGS?: YES

## 2023-11-20 SDOH — SOCIAL STABILITY: SOCIAL INSECURITY: HAVE YOU HAD THOUGHTS OF HARMING ANYONE ELSE?: NO

## 2023-11-20 SDOH — SOCIAL STABILITY: SOCIAL INSECURITY: DO YOU FEEL ANYONE HAS EXPLOITED OR TAKEN ADVANTAGE OF YOU FINANCIALLY OR OF YOUR PERSONAL PROPERTY?: NO

## 2023-11-20 SDOH — SOCIAL STABILITY: SOCIAL INSECURITY: ABUSE: ADULT

## 2023-11-20 SDOH — SOCIAL STABILITY: SOCIAL INSECURITY: DO YOU FEEL UNSAFE GOING BACK TO THE PLACE WHERE YOU ARE LIVING?: NO

## 2023-11-20 SDOH — SOCIAL STABILITY: SOCIAL INSECURITY: ARE YOU OR HAVE YOU BEEN THREATENED OR ABUSED PHYSICALLY, EMOTIONALLY, OR SEXUALLY BY ANYONE?: NO

## 2023-11-20 SDOH — SOCIAL STABILITY: SOCIAL INSECURITY: HAS ANYONE EVER THREATENED TO HURT YOUR FAMILY OR YOUR PETS?: NO

## 2023-11-20 SDOH — SOCIAL STABILITY: SOCIAL INSECURITY: DOES ANYONE TRY TO KEEP YOU FROM HAVING/CONTACTING OTHER FRIENDS OR DOING THINGS OUTSIDE YOUR HOME?: NO

## 2023-11-20 ASSESSMENT — LIFESTYLE VARIABLES
HOW OFTEN DO YOU HAVE 6 OR MORE DRINKS ON ONE OCCASION: NEVER
HOW OFTEN DO YOU HAVE A DRINK CONTAINING ALCOHOL: NEVER
PRESCIPTION_ABUSE_PAST_12_MONTHS: NO
SKIP TO QUESTIONS 9-10: 1
AUDIT-C TOTAL SCORE: 0
SUBSTANCE_ABUSE_PAST_12_MONTHS: NO
AUDIT-C TOTAL SCORE: 0
HOW MANY STANDARD DRINKS CONTAINING ALCOHOL DO YOU HAVE ON A TYPICAL DAY: PATIENT DOES NOT DRINK

## 2023-11-20 ASSESSMENT — ACTIVITIES OF DAILY LIVING (ADL)
HEARING - LEFT EAR: FUNCTIONAL
ADEQUATE_TO_COMPLETE_ADL: YES
JUDGMENT_ADEQUATE_SAFELY_COMPLETE_DAILY_ACTIVITIES: YES
FEEDING YOURSELF: INDEPENDENT
TOILETING: INDEPENDENT
WALKS IN HOME: INDEPENDENT
HEARING - RIGHT EAR: FUNCTIONAL
PATIENT'S MEMORY ADEQUATE TO SAFELY COMPLETE DAILY ACTIVITIES?: YES
GROOMING: INDEPENDENT
LACK_OF_TRANSPORTATION: NO
BATHING: INDEPENDENT
DRESSING YOURSELF: INDEPENDENT

## 2023-11-20 ASSESSMENT — COGNITIVE AND FUNCTIONAL STATUS - GENERAL
WALKING IN HOSPITAL ROOM: A LITTLE
MOBILITY SCORE: 22
CLIMB 3 TO 5 STEPS WITH RAILING: A LITTLE
PATIENT BASELINE BEDBOUND: NO
DAILY ACTIVITIY SCORE: 24

## 2023-11-20 ASSESSMENT — PAIN SCALES - GENERAL
PAINLEVEL_OUTOF10: 8
PAINLEVEL_OUTOF10: 7
PAINLEVEL_OUTOF10: 6
PAINLEVEL_OUTOF10: 0 - NO PAIN
PAINLEVEL_OUTOF10: 6
PAINLEVEL_OUTOF10: 8
PAINLEVEL_OUTOF10: 4
PAINLEVEL_OUTOF10: 6
PAINLEVEL_OUTOF10: 8
PAINLEVEL_OUTOF10: 0 - NO PAIN
PAINLEVEL_OUTOF10: 5 - MODERATE PAIN
PAINLEVEL_OUTOF10: 0 - NO PAIN
PAINLEVEL_OUTOF10: 6
PAINLEVEL_OUTOF10: 0 - NO PAIN

## 2023-11-20 ASSESSMENT — PAIN - FUNCTIONAL ASSESSMENT
PAIN_FUNCTIONAL_ASSESSMENT: 0-10

## 2023-11-20 ASSESSMENT — PATIENT HEALTH QUESTIONNAIRE - PHQ9
1. LITTLE INTEREST OR PLEASURE IN DOING THINGS: NOT AT ALL
SUM OF ALL RESPONSES TO PHQ9 QUESTIONS 1 & 2: 0
2. FEELING DOWN, DEPRESSED OR HOPELESS: NOT AT ALL

## 2023-11-20 ASSESSMENT — COLUMBIA-SUICIDE SEVERITY RATING SCALE - C-SSRS
2. HAVE YOU ACTUALLY HAD ANY THOUGHTS OF KILLING YOURSELF?: NO
6. HAVE YOU EVER DONE ANYTHING, STARTED TO DO ANYTHING, OR PREPARED TO DO ANYTHING TO END YOUR LIFE?: NO
1. IN THE PAST MONTH, HAVE YOU WISHED YOU WERE DEAD OR WISHED YOU COULD GO TO SLEEP AND NOT WAKE UP?: NO

## 2023-11-20 ASSESSMENT — PAIN DESCRIPTION - LOCATION: LOCATION: ABDOMEN

## 2023-11-20 NOTE — ANESTHESIA PREPROCEDURE EVALUATION
Patient: Darline Gonzalez    Procedure Information       Date/Time: 11/20/23 1100    Procedure: Hysterectomy Laparoscopy with Salpingo-Oophorectomy    Location: [facility] 03 / Virtual [facility]    Surgeons: Dina Grimes MD            Relevant Problems   Cardiovascular   (+) Aortic stenosis   (+) Arteriosclerosis of coronary artery   (+) Benign essential hypertension   (+) CAD (coronary artery disease)   (+) Hypercholesterolemia   (+) Hypertension   (+) Mild aortic stenosis   (+) Sick sinus syndrome due to SA node dysfunction (CMS/HCC)   (+) Supraventricular arrhythmia      Endocrine   (+) Class 1 obesity with body mass index (BMI) of 30.0 to 30.9 in adult   (+) Diabetes mellitus, type 2 (CMS/HCC)      GI   (+) GERD (gastroesophageal reflux disease)   (+) Hiatal hernia      /Renal   (+) Fatty liver   (+) Hepatic cyst      Neuro/Psych   (+) Anxiety associated with depression   (+) 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   (+) Generalized anxiety disorder   (+) Left pontine stroke (CMS/HCC)   (+) Left-sided Bell's palsy   (+) Recurrent major depressive disorder, in partial remission (CMS/HCC)      Pulmonary   (+) Dyspnea on exertion   (+) TRINA (obstructive sleep apnea)   (+) TRINA on CPAP   (+) Pulmonary nodules      GI/Hepatic   (+) Fatty liver      Musculoskeletal   (+) Degenerative arthritis of left shoulder region      Infectious Disease   (+) COVID-19 virus infection   (+) Oral thrush   (+) Paronychia of finger of right hand       Clinical information reviewed:   Tobacco  Allergies  Meds   Med Hx  Surg Hx   Fam Hx  Soc Hx        NPO Detail:  NPO/Void Status  Carbonhydrate Drink Given Prior to Surgery? : N  Date of Last Liquid: 11/20/23  Time of Last Liquid: 0700  Date of Last Solid: 11/20/23  Time of Last Solid: 0000  Last Intake Type: Clear fluids         PHYSICAL EXAM    Anesthesia Plan

## 2023-11-20 NOTE — ANESTHESIA PROCEDURE NOTES
Airway  Date/Time: 11/20/2023 12:14 PM  Urgency: elective    Airway not difficult    Staffing  Performed: resident   Authorized by: Kacey Kraft MD    Performed by: Jamel Newell DO  Patient location during procedure: OR    Indications and Patient Condition  Indications for airway management: anesthesia  Spontaneous Ventilation: absent  Sedation level: deep  Preoxygenated: yes  Patient position: sniffing  Mask difficulty assessment: 2 - vent by mask + OA or adjuvant +/- NMBA  Planned trial extubation    Final Airway Details  Final airway type: endotracheal airway      Successful airway: ETT  Cuffed: yes   Successful intubation technique: video laryngoscopy ([place])  Facilitating devices/methods: intubating stylet  Blade: [place]  Blade size: #3  ETT size (mm): 7.0  Cormack-Lehane Classification: grade I - full view of glottis  Placement verified by: chest auscultation and capnometry   Cuff volume (mL): 8  Measured from: teeth  ETT to teeth (cm): 21  Number of attempts at approach: 1

## 2023-11-20 NOTE — OP NOTE
Date: 2023  OR Location: [facility] OR    Name: Darline Gonzalez, : 1950, Age: 73 y.o., MRN: [id number], Sex: female    Diagnosis  Pre-op Diagnosis     * Intra-abdominal and pelvic swelling, mass and lump, unspecified site [R19.00] Post-op Diagnosis     * Intra-abdominal and pelvic swelling, mass and lump, unspecified site [R19.00]     Procedures  Laparoscopic Hysterectomy with Bilateral Salpingo-Oophorectomy and Cystoscopy  85192 - ID LAPS TOTAL HYSTERECT 250 GM/< W/RMVL TUBE/OVARY      Surgeons      * Dina Grimes - Primary    Resident/Fellow/Other Assistant:  Surgeon(s) and Role:     * Verena Agee MD - Resident - Assisting    Procedure Summary  Anesthesia: General  ASA: III  Anesthesia Staff: Anesthesiologist: Kacey Kraft MD; Deloris Osei MD  CRNA: RICHARD Rogers-CRNA  Anesthesia Resident: Jamel Newell DO  Estimated Blood Loss: 10mL  Intra-op Medications:   Medication Name Total Dose   sodium chloride 0.9 % irrigation solution 1,000 mL   surgical lubricant gel 1 Application              Anesthesia Record               Intraprocedure I/O Totals          Intake    Dexmedetomidine 4.60 mL    The total shown is the total volume documented since Anesthesia Start was filed.    Propofol Drip 35.00 mL    The total shown is the total volume documented since Anesthesia Start was filed.    lactated Ringer's 800.00 mL    Total Intake 839.6 mL       Output    Urine 250 mL    Est. Blood Loss 10 mL    Total Output 260 mL       Net    Net Volume 579.6 mL          Specimen:   ID Type Source Tests Collected by Time   1 : UTERUS, CERVIX, BILATERAL FALLOPIAN TUBES AND OVARIES Tissue UTERUS, CERVIX, FALLOPIAN TUBES AND OVARIES BILATERAL SURGICAL PATHOLOGY EXAM Dina Grimes MD 2023 1257        Staff:   Circulator: Mere Xiong, DULCE; Lorna Nelson, RN  Relief Circulator: Karrie Alexis RN; Adolfo Pond, RN  Relief Scrub: Amena Person RN  Scrub Person: Shona Hardy; Krysten PARMAR  DULCE Pierce    Findings  Large posterior pedunculated fibroid, enlarged fibroid uterus. Grossly normal Fallopian tubes and ovaries. Adhesions noted between the omentum and prior ostomy site with loops of bowel contained within omentum. Normal upper abdominal anatomy.     Description of Procedure  Patient was taken to the operating room where she was prepared and draped in the usual sterile fashion.  A VCare uterine manipulator was placed.  A Day catheter was placed. Attention was then turned abdominally.  The base of the umbilicus was elevated using Kocher clamps.  The skin was incised with a scalpel.  The fascia was grasped with Kochers and entered sharply using a scalpel.  Peritoneum was bluntly opened using a Anh clamp.  Intraperitoneal placement was confirmed via visualization of underlying bowel.     Francia trocar was then placed at the umbilical entry site. Diagnostic laparoscopy was performed, and revealed findings as noted above.  No areas of trauma or injury were noted immediately inferior to the umbilicus. Complete abdominal survey was performed. The patient was then placed in steep Trendelenburg positioning.     Ancillary trocars were then placed under direct visualization. Three 5mm trocars were placed, one in the LLQ, one in the RLQ, and one trocar was placed suprapubically. The adhesions between the omentum/bowel/anterior abdominal wall were then taken down.     Attention was turned to the pelvis. The right round ligament was opened, and the right ureter identified. Ureterolysis was performed. A window was then made in the posterior broad ligament and extended to skeletonize the IP ligament and further separate the ureter from the planned site of IP ligation. The IP was then clamped, sealed, and transected. Excellent hemostasis was noted. The broad ligament was then opened anteriorly and posteriorly to skeletonize the uterine vasculature.  During this process, retraction of the posterior fibroid  was necessary, as it obscured visualization. The bladder flap was started using the LigaSure device.  The uterine vasculature was then clamped sealed and transected using the LigaSure device.  It was lateralized using the LigaSure device.      Attention was then turned to the left side of the pelvis, which was dissected in a similar fashion.     Monopolar electrosurgery was then utilized to complete the bladder flap.  The bladder was dissected away from the lower uterine segment and cervix using blunt dissection and monopolar electrosurgery.  At this time care was taken to ensure that the bladder was well out of the operative field as well as the bilateral uterine pedicles. Colpotomy was then performed using monopolar electrosurgery.  The uterus was fully  from the vagina. The uterus, cervix, bilateral Fallopian tubes, and ovaries were then placed in a bag and contained morcellation performed through the vagina.      The pelvis was inspected and noted be adequately hemostatic.  The vaginal cuff was then reapproximated laparoscopically using 0 V lock suture. Hemostasis was achieved using monopolar electrosurgery.      The Day catheter was removed. The bladder was backfilled so the serosa could be evaluated, and it was noted to be intact. Cystoscopy was then performed and revealed intact bladder with bilateral ureteral jets and no evidence of trauma or foreign material.      The umbilical fascia was then closed with 0 Vicryl suture.  The skin was closed with 4-0 Monocryl.        The patient was awakened from general anesthesia and taken the recovery room in stable condition.      I was present and scrubbed for the entirety of the surgical procedure. This was procedure was substantially more complicated and required increase time and surgical expertise due to large posterior pedunculated fibroid and prior surgery w/ creation of adhesions.     Complications:  None; patient tolerated the procedure well.      Disposition: PACU - hemodynamically stable.  Condition: stable  Specimens Collected:   Order Name Source Comment Collection Info Order Time   VERAB/VERIFY ABORH Blood, Venous **This is for confirming/verifying history of ABORh on file for transfusion of blood products. If this is not for transfusion, please order an ABO/RH [[id number]]. If you have any questions or unsure what to order, please call the blood bank.** Collected By: Magaly Tiwari RN 11/20/2023 10:50 AM     Release result to [facility]   Immediate        MAGNESIUM Blood, Venous   11/20/2023  6:46 PM     Release result to [facility]   Immediate        CBC Blood, Venous Post op day of surgery at 2200  11/20/2023  6:47 PM     Release result to [facility]   Immediate        BASIC METABOLIC PANEL Blood, Venous Post Op Day of Surgery at 2200  11/20/2023  6:47 PM     Release result to [facility]   Immediate        SURGICAL PATHOLOGY EXAM UTERUS, CERVIX, FALLOPIAN TUBES AND OVARIES BILATERAL Pre-op diagnosis:  Intra-abdominal and pelvic swelling, mass and lump, unspecified site [R19.00] Collected By: Dina Grimes MD 11/20/2023  2:37 PM         Dina Grimes  Phone Number: [phone]

## 2023-11-20 NOTE — PROCEDURES
Peripheral Block    Patient location during procedure: pre-op  Start time: 11/20/2023 11:00 AM  End time: 11/20/2023 11:17 AM  Reason for block: post-op pain management  Staffing  Performed: resident   Authorized by: Mak Harmon MD    Performed by: Mak Harmon MD  Preanesthetic Checklist  Completed: patient identified, IV checked, site marked, risks and benefits discussed, surgical consent, monitors and equipment checked, pre-op evaluation and timeout performed   Timeout performed at: 11/20/2023 11:05 AM  Peripheral Block  Patient position: laying flat  Prep: ChloraPrep  Patient monitoring: cardiac monitor, continuous pulse ox and heart rate  Block type: QL  Laterality: B/L  Injection technique: single-shot  Guidance: ultrasound guided  Local infiltration: ropivacaine  Infiltration strength: 0.5 %  Dose: 30 mL  Needle  Needle type: short-bevel   Needle localization: ultrasound guidance     image stored in chart  Assessment  Injection assessment: negative aspiration for heme, no paresthesia on injection and incremental injection  Paresthesia pain: none  Heart rate change: no  Slow fractionated injection: no  Additional Notes  Risk and benefits discussed. Consent obtained. Timeout @ 1105 with katt CARDONA. Patient positioned and 3cc lidocaine injected for skin. Needle visualized on ultrasound. Ropivacaine injected slowly and spread seen on ultrasound. 15cc injected on each side. Patient tolerated well without complications.

## 2023-11-20 NOTE — ANESTHESIA PREPROCEDURE EVALUATION
Patient: Darline Gonzalez    Procedure Information       Date/Time: 11/20/23 1100    Procedure: Hysterectomy Laparoscopy with Salpingo-Oophorectomy    Location: [facility] OR 03 / [facility]    Surgeons: Dina Grimes MD            Relevant Problems   Anesthesia (within normal limits)   No history of complications History of anesthesia complications      Cardiovascular   (+) Aortic stenosis   (+) Arteriosclerosis of coronary artery   (+) Benign essential hypertension   (+) CAD (coronary artery disease)   (+) Hypercholesterolemia   (+) Hypertension   (+) Mild aortic stenosis   (+) Sick sinus syndrome due to SA node dysfunction (CMS/HCC)   (+) Supraventricular arrhythmia      Endocrine   (+) Class 1 obesity with body mass index (BMI) of 30.0 to 30.9 in adult   (+) Diabetes mellitus, type 2 (CMS/HCC)      GI   (+) GERD (gastroesophageal reflux disease)   (+) Hiatal hernia      /Renal   (+) Fatty liver   (+) Hepatic cyst      Neuro/Psych   (+) Anxiety associated with depression   (+) 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   (+) Generalized anxiety disorder   (+) Left pontine stroke (CMS/HCC)   (+) Left-sided Bell's palsy   (+) Recurrent major depressive disorder, in partial remission (CMS/HCC)      Pulmonary   (+) Dyspnea on exertion   (+) TRINA (obstructive sleep apnea)   (+) TRINA on CPAP   (+) Pulmonary nodules      GI/Hepatic   (+) Fatty liver      Musculoskeletal   (+) Degenerative arthritis of left shoulder region      Infectious Disease   (+) COVID-19 virus infection   (+) Oral thrush   (+) Paronychia of finger of right hand   H/o COVID (not current infection)    Clinical information reviewed:   Tobacco  Allergies  Meds   Med Hx  Surg Hx   Fam Hx  Soc Hx        NPO Detail:  NPO/Void Status  Carbonhydrate Drink Given Prior to Surgery? : N  Date of Last Liquid: 11/20/23  Time of Last Liquid: 0700  Date of Last Solid: 11/20/23  Time of Last Solid: 0000  Last Intake Type: Clear fluids         Physical  Exam    Airway  Mallampati: III  TM distance: >3 FB  Neck ROM: limited     Cardiovascular    Dental    Pulmonary    Abdominal            Anesthesia Plan    ASA 3     general   (GA ETT, videolaryngoscope  PIV x2  Pacer interrogation   Discussed osiel if needed for hemodynamic monitoring  Did not bring home CPAP)  intravenous induction   Anesthetic plan and risks discussed with patient.  Use of blood products discussed with patient who consented to blood products.

## 2023-11-20 NOTE — H&P
Surgical History and Physical    72 yo P1 presents for TLH-BSO in setting of fibroids. Discussed if unable to hysterectomy secondary to scar tissue, will remove pedunculated fibroid only.    Pre op labs: Cr 0.82, Hgb 15.1    Recent MRI pelvis showed uterus measuring 8cm x 8cm x 12cm with pedunculated fibroid (8cm x 5cm x 7cm) and a 4cm x 6cm x 5cm submucosal fibroid.     PMHx: Recent BMP w/ creatinine 1.16, T2DM w/ recent A1C 6.1%, hx of breast cancer (s/p lumpectomy and rads 2011), hx of necrotizing fascitis during breast cancer treatment  PSHx: breast reduction, wound debridement for gluteal nec fasc w/ laparoscopic diverting ostomy for wound healing, cholecystectomy     Obstetrical History   OB History   No obstetric history on file.       Past Medical History  Past Medical History:   Diagnosis Date    Anxiety     Arrhythmia     Sick sinus syndrome due to SA node dysfunction    Coronary artery disease     cards: Raju Deon,Aortic stenosis    Depression     Deviated nasal septum     Deviated septum    Dry mouth, unspecified     Dry mouth    Dysfunctional uterine bleeding     Elevated C-reactive protein (CRP)     CRP elevated    Fatty (change of) liver, not elsewhere classified     Fatty liver    Fibroid     Ob/Gyn; Dina Grimes    GERD (gastroesophageal reflux disease)     Heart disease     Hemorrhage of anus and rectum     Rectal bleed    Hyperlipidemia     Hypertension     Incisional hernia     Localized edema     Bilateral edema of lower extremity    Low back pain, unspecified 10/23/2016    Acute low back pain    Other abnormal glucose     Elevated glucose    Other conditions influencing health status 07/12/2019    History of cough    Other specified abnormal immunological findings in serum     Positive KURT (antinuclear antibody)    Personal history of malignant neoplasm of breast 06/17/2022    History of malignant neoplasm of breast    Personal history of other benign neoplasm     History of uterine leiomyoma     Personal history of other diseases of the musculoskeletal system and connective tissue     History of necrotizing fasciitis    Personal history of other diseases of the musculoskeletal system and connective tissue     History of temporomandibular joint disorder    Personal history of other diseases of the nervous system and sense organs     History of carpal tunnel syndrome    Restless leg syndrome     Sleep apnea     Uses CPAP    Syncope     hx of syncopal episodes and near syncopal episodes.        Past Surgical History   Past Surgical History:   Procedure Laterality Date    BREAST LUMPECTOMY  02/25/2014    Left Breast Lumpectomy    BREAST SURGERY  09/10/2018    Breast Surgery Reduction Procedure    CHOLECYSTECTOMY      COLONOSCOPY      DILATION AND CURETTAGE OF UTERUS  02/25/2014    Dilation And Curettage    ENDOSCOPIC INSERTION PERITONEAL CATHETER PORT      INSERT / REPLACE / REMOVE PACEMAKER  06/09/2023    MR HEAD ANGIO WO IV CONTRAST  06/29/2020    MR HEAD ANGIO WO IV CONTRAST 6/29/2020 [facility]    OTHER SURGICAL HISTORY  02/25/2014    Surgery    OTHER SURGICAL HISTORY  02/25/2014    Skin Debridement Buttocks    OTHER SURGICAL HISTORY  02/25/2014    Percutaneous Portal Vein Catheter Placement    OTHER SURGICAL HISTORY  06/04/2018    History Of Prior Surgery       Social History  Social History     Tobacco Use    Smoking status: Never    Smokeless tobacco: Never   Substance Use Topics    Alcohol use: Yes     Comment: 1 drink every 3 months     Substance and Sexual Activity   Drug Use Never       Allergies  Latex, Other, and Sulfa (sulfonamide antibiotics)     Medications  Medications Prior to Admission   Medication Sig Dispense Refill Last Dose    aspirin 81 mg EC tablet Take 1 tablet (81 mg) by mouth once daily.   Past Week    cholecalciferol (Vitamin D-3) 50 mcg (2,000 unit) capsule Take 1 capsule (50 mcg) by mouth once daily.   11/19/2023    cyanocobalamin, vitamin B-12, (VITAMIN B-12 ORAL)  Take by mouth.   11/19/2023    escitalopram (Lexapro) 10 mg tablet Take 1 tablet (10 mg) by mouth once daily. 90 tablet 0 11/20/2023    lisinopril 10 mg tablet Take 1 tablet (10 mg) by mouth once daily.   11/19/2023    pantoprazole (ProtoNix) 40 mg EC tablet Take 1 tablet (40 mg) by mouth once daily. 90 tablet 1 11/20/2023    rosuvastatin (Crestor) 20 mg tablet Take 1 tablet (20 mg) by mouth once daily. 90 tablet 0 11/19/2023    vitamin E 180 mg (400 unit) capsule Take 1 capsule (400 Units) by mouth.   11/19/2023    anastrozole (Arimidex) 1 mg tablet Take 1 tablet (1 mg total) by mouth once daily.       atenolol (Tenormin) 100 mg tablet Take 1 tablet (100 mg) by mouth once daily.       ezetimibe (Zetia) 10 mg tablet Take 1 tablet (10 mg) by mouth once daily. (Patient not taking: Reported on 11/20/2023) 90 tablet 1 Not Taking    hydrOXYzine HCL (Atarax) 25 mg tablet Take 1 tablet (25 mg) by mouth every 6 hours if needed.          Objective    Last Vitals  Temp Pulse Resp BP MAP O2 Sat   37.3 °C (99.1 °F) 68 18 (!) 134/95   95 %     Physical Examination  Gen: well appearing, no distress  Pulm: normal respiratory effort on room air  CV: warm, well perfused  GI: abdomen, soft, non tender throughout  Neuro: no gross focal deficits  Psych: appropriate mood and affect    Nargis Washington MD PGY-1  Obstetrics & Gynecology

## 2023-11-20 NOTE — PERIOPERATIVE NURSING NOTE
1530- [facility] messaged Dr. Osei for elevated BP. Awaiting response   1542-Dr Ram ordered for patient to receive 5mg hydralazine  1650- [facility] messaged  concerning patients pain management. Patient received new order for robaxin.   1745- Patients pain is tolerable, appears to be resting comfortably. Report given to DULCE Cast on [facility] 6.

## 2023-11-20 NOTE — CONSULTS
Consults  Acute Pain Service  Darline Gonzalez is a 73 y.o. year old female patient who presents for Laparoscopic hysterectomy and bso with Dr. Grimes. Acute Pain consulted for block for postoperative pain control.     Anticipated Postop Pain Issues -   Palliative: typically relieved with IV analgesics and regional local anesthetics  Provocative: typically with movement  Quality: typically burning and aching  Radiation: typically none  Severity: typically severe 8-10/10  Timing: typically constant    Past Medical History:   Diagnosis Date    Anxiety     Arrhythmia     Sick sinus syndrome due to SA node dysfunction    Coronary artery disease     cards: Raju Deon,Aortic stenosis    Depression     Deviated nasal septum     Deviated septum    Dry mouth, unspecified     Dry mouth    Dysfunctional uterine bleeding     Elevated C-reactive protein (CRP)     CRP elevated    Fatty (change of) liver, not elsewhere classified     Fatty liver    Fibroid     Ob/Gyn; Dina Grimes    GERD (gastroesophageal reflux disease)     Heart disease     Hemorrhage of anus and rectum     Rectal bleed    Hyperlipidemia     Hypertension     Incisional hernia     Localized edema     Bilateral edema of lower extremity    Low back pain, unspecified 10/23/2016    Acute low back pain    Other abnormal glucose     Elevated glucose    Other conditions influencing health status 07/12/2019    History of cough    Other specified abnormal immunological findings in serum     Positive KURT (antinuclear antibody)    Personal history of malignant neoplasm of breast 06/17/2022    History of malignant neoplasm of breast    Personal history of other benign neoplasm     History of uterine leiomyoma    Personal history of other diseases of the musculoskeletal system and connective tissue     History of necrotizing fasciitis    Personal history of other diseases of the musculoskeletal system and connective tissue     History of temporomandibular joint disorder     Personal history of other diseases of the nervous system and sense organs     History of carpal tunnel syndrome    Restless leg syndrome     Sleep apnea     Uses CPAP    Syncope     hx of syncopal episodes and near syncopal episodes.        Past Surgical History:   Procedure Laterality Date    BREAST LUMPECTOMY  02/25/2014    Left Breast Lumpectomy    BREAST SURGERY  09/10/2018    Breast Surgery Reduction Procedure    CHOLECYSTECTOMY      COLONOSCOPY      DILATION AND CURETTAGE OF UTERUS  02/25/2014    Dilation And Curettage    ENDOSCOPIC INSERTION PERITONEAL CATHETER PORT      INSERT / REPLACE / REMOVE PACEMAKER  06/09/2023    MR HEAD ANGIO WO IV CONTRAST  06/29/2020    MR HEAD ANGIO WO IV CONTRAST 6/29/2020 [facility]    OTHER SURGICAL HISTORY  02/25/2014    Surgery    OTHER SURGICAL HISTORY  02/25/2014    Skin Debridement Buttocks    OTHER SURGICAL HISTORY  02/25/2014    Percutaneous Portal Vein Catheter Placement    OTHER SURGICAL HISTORY  06/04/2018    History Of Prior Surgery        Family History   Problem Relation Name Age of Onset    Hypertension Mother      Diabetes type II Mother          Social History     Socioeconomic History    Marital status:      Spouse name: Not on file    Number of children: 1    Years of education: Not on file    Highest education level: Not on file   Occupational History    Not on file   Tobacco Use    Smoking status: Never    Smokeless tobacco: Never   Vaping Use    Vaping Use: Never used   Substance and Sexual Activity    Alcohol use: Yes     Comment: 1 drink every 3 months    Drug use: Never    Sexual activity: Not on file   Other Topics Concern    Not on file   Social History Narrative    Not on file     Social Determinants of Health     Financial Resource Strain: Not on file   Food Insecurity: Not on file   Transportation Needs: Not on file   Physical Activity: Not on file   Stress: Not on file   Social Connections: Not on file   Intimate Partner Violence:  Not on file   Housing Stability: Not on file        Allergies   Allergen Reactions    Latex Unknown    Other Itching    Sulfa (Sulfonamide Antibiotics) Itching         Review of Systems  Gen: No fatigue, anorexia, insomnia, fever.   Eyes: No vision loss, double vision, drainage, eye pain.   ENT: No pharyngitis, dry mouth, no hearing changes or ear discharge  Cardiac: No chest pain, palpitations, syncope, near syncope.   Pulmonary: No shortness of breath, cough, hemoptysis.   Heme/lymph: No swollen glands, fever, bleeding.   GI: No abdominal pain, change in bowel habits, melena, hematemesis, hematochezia, nausea, vomiting, diarrhea.   : No discharge, dysuria, frequency, urgency, hematuria.  Endo: No polyuria or weight loss.   Musculoskeletal: Negative for any pain or loss of ROM/weakness  Skin: No rashes or lesions  Neuro: Normal speech, no numbness or weakness. No gait difficulties  Review of systems is otherwise negative unless stated above or in history of present illness.    Physical Exam:  Constitutional:  no distress, alert and cooperative  Eyes: clear sclera  Head/Neck: No apparent injury, trachea midline  Respiratory/Thorax: Patent airways, thorax symmetric, breathing comfortably  Cardiovascular: no pitting edema  Gastrointestinal: Nondistended  Musculoskeletal: ROM intact  Extremities: no clubbing  Neurological: alert, gordillo x4  Psychological: Appropriate affect    No results found for this or any previous visit (from the past 24 hour(s)).     Plan:    - B/l QL ss blocks performed preoperatively on 11/20  - Pain medications per primary team  - Will see on POD1 if inpatient    Acute Pain Team  pg [phone] ph [phone].

## 2023-11-20 NOTE — ANESTHESIA POSTPROCEDURE EVALUATION
Patient: Darline Gonzaelz    Procedure Summary       Date: 11/20/23 Room / Location: [facility] OR 03 / [facility]    Anesthesia Start: 1145 Anesthesia Stop: 1508    Procedure: Laparoscopic Hysterectomy with Bilateral Salpingo-Oophorectomy and Cystoscopy (Uterus) Diagnosis:       Intra-abdominal and pelvic swelling, mass and lump, unspecified site      (Intra-abdominal and pelvic swelling, mass and lump, unspecified site [R19.00])    Surgeons: Dina Grimes MD Responsible Provider: Kacey Kraft MD    Anesthesia Type: general ASA Status: 3            Anesthesia Type: general    Vitals Value Taken Time   /63 11/20/23 1630   Temp 36.4 °C (97.5 °F) 11/20/23 1505   Pulse 76 11/20/23 1641   Resp 17 11/20/23 1641   SpO2 93 % 11/20/23 1641   Vitals shown include unvalidated device data.    Anesthesia Post Evaluation    Patient location during evaluation: PACU  Patient participation: complete - patient participated  Level of consciousness: awake and alert  Pain management: adequate  Airway patency: patent  Cardiovascular status: acceptable  Respiratory status: acceptable  Hydration status: acceptable  Postoperative Nausea and Vomiting: none        No notable events documented.

## 2023-11-21 VITALS
WEIGHT: 206.13 LBS | RESPIRATION RATE: 18 BRPM | HEART RATE: 65 BPM | HEIGHT: 68 IN | OXYGEN SATURATION: 92 % | SYSTOLIC BLOOD PRESSURE: 152 MMHG | TEMPERATURE: 98.6 F | BODY MASS INDEX: 31.24 KG/M2 | DIASTOLIC BLOOD PRESSURE: 81 MMHG

## 2023-11-21 PROBLEM — I25.10 CAD (CORONARY ARTERY DISEASE): Chronic | Status: ACTIVE | Noted: 2023-02-07

## 2023-11-21 PROBLEM — G51.0 FACIAL PARALYSIS ON LEFT SIDE: Status: RESOLVED | Noted: 2023-02-07 | Resolved: 2023-11-21

## 2023-11-21 PROBLEM — L60.1 ONYCHOLYSIS OF TOENAIL: Status: RESOLVED | Noted: 2023-02-07 | Resolved: 2023-11-21

## 2023-11-21 PROBLEM — M77.31 CALCANEAL SPUR OF RIGHT FOOT: Status: RESOLVED | Noted: 2023-02-07 | Resolved: 2023-11-21

## 2023-11-21 PROBLEM — R43.2: Status: RESOLVED | Noted: 2023-02-07 | Resolved: 2023-11-21

## 2023-11-21 PROBLEM — L60.3 DYSTROPHIC NAIL: Status: RESOLVED | Noted: 2023-02-07 | Resolved: 2023-11-21

## 2023-11-21 PROBLEM — I35.0 AORTIC STENOSIS: Chronic | Status: ACTIVE | Noted: 2023-02-07

## 2023-11-21 PROBLEM — R61 DIAPHORESIS: Status: RESOLVED | Noted: 2023-02-07 | Resolved: 2023-11-21

## 2023-11-21 PROBLEM — R19.7 DIARRHEA: Status: RESOLVED | Noted: 2023-02-07 | Resolved: 2023-11-21

## 2023-11-21 PROBLEM — M25.512 ACUTE PAIN OF LEFT SHOULDER: Status: RESOLVED | Noted: 2023-02-07 | Resolved: 2023-11-21

## 2023-11-21 PROBLEM — R00.1 BRADYCARDIA: Status: RESOLVED | Noted: 2023-02-07 | Resolved: 2023-11-21

## 2023-11-21 PROBLEM — E11.9 DIABETES MELLITUS, TYPE 2 (MULTI): Status: RESOLVED | Noted: 2023-02-07 | Resolved: 2023-11-21

## 2023-11-21 PROBLEM — Z90.710 S/P HYSTERECTOMY: Status: ACTIVE | Noted: 2023-11-21

## 2023-11-21 PROBLEM — R15.9 FECAL INCONTINENCE: Status: RESOLVED | Noted: 2023-02-07 | Resolved: 2023-11-21

## 2023-11-21 PROBLEM — H10.11 ALLERGIC CONJUNCTIVITIS OF RIGHT EYE: Status: RESOLVED | Noted: 2023-02-07 | Resolved: 2023-11-21

## 2023-11-21 LAB
ANION GAP SERPL CALC-SCNC: 19 MMOL/L (ref 10–20)
BUN SERPL-MCNC: 18 MG/DL (ref 6–23)
CALCIUM SERPL-MCNC: 9.5 MG/DL (ref 8.6–10.6)
CHLORIDE SERPL-SCNC: 105 MMOL/L (ref 98–107)
CO2 SERPL-SCNC: 20 MMOL/L (ref 21–32)
CREAT SERPL-MCNC: 0.75 MG/DL (ref 0.5–1.05)
ERYTHROCYTE [DISTWIDTH] IN BLOOD BY AUTOMATED COUNT: 14.5 % (ref 11.5–14.5)
GFR SERPL CREATININE-BSD FRML MDRD: 83 ML/MIN/1.73M*2
GLUCOSE BLD MANUAL STRIP-MCNC: 146 MG/DL (ref 74–99)
GLUCOSE SERPL-MCNC: 172 MG/DL (ref 74–99)
HCT VFR BLD AUTO: 46.2 % (ref 36–46)
HGB BLD-MCNC: 14.3 G/DL (ref 12–16)
MAGNESIUM SERPL-MCNC: 1.97 MG/DL (ref 1.6–2.4)
MCH RBC QN AUTO: 33.2 PG (ref 26–34)
MCHC RBC AUTO-ENTMCNC: 31 G/DL (ref 32–36)
MCV RBC AUTO: 107 FL (ref 80–100)
NRBC BLD-RTO: 0 /100 WBCS (ref 0–0)
PLATELET # BLD AUTO: 154 X10*3/UL (ref 150–450)
POTASSIUM SERPL-SCNC: 4.1 MMOL/L (ref 3.5–5.3)
RBC # BLD AUTO: 4.31 X10*6/UL (ref 4–5.2)
SODIUM SERPL-SCNC: 140 MMOL/L (ref 136–145)
WBC # BLD AUTO: 19.6 X10*3/UL (ref 4.4–11.3)

## 2023-11-21 PROCEDURE — G0378 HOSPITAL OBSERVATION PER HR: HCPCS

## 2023-11-21 PROCEDURE — 2500000001 HC RX 250 WO HCPCS SELF ADMINISTERED DRUGS (ALT 637 FOR MEDICARE OP): Performed by: STUDENT IN AN ORGANIZED HEALTH CARE EDUCATION/TRAINING PROGRAM

## 2023-11-21 PROCEDURE — 85027 COMPLETE CBC AUTOMATED: CPT | Performed by: STUDENT IN AN ORGANIZED HEALTH CARE EDUCATION/TRAINING PROGRAM

## 2023-11-21 PROCEDURE — 2500000004 HC RX 250 GENERAL PHARMACY W/ HCPCS (ALT 636 FOR OP/ED): Performed by: STUDENT IN AN ORGANIZED HEALTH CARE EDUCATION/TRAINING PROGRAM

## 2023-11-21 PROCEDURE — 7100000011 HC EXTENDED STAY RECOVERY HOURLY - NURSING UNIT

## 2023-11-21 PROCEDURE — 2500000001 HC RX 250 WO HCPCS SELF ADMINISTERED DRUGS (ALT 637 FOR MEDICARE OP)

## 2023-11-21 PROCEDURE — 99231 SBSQ HOSP IP/OBS SF/LOW 25: CPT | Performed by: STUDENT IN AN ORGANIZED HEALTH CARE EDUCATION/TRAINING PROGRAM

## 2023-11-21 PROCEDURE — 80048 BASIC METABOLIC PNL TOTAL CA: CPT | Performed by: STUDENT IN AN ORGANIZED HEALTH CARE EDUCATION/TRAINING PROGRAM

## 2023-11-21 PROCEDURE — 82947 ASSAY GLUCOSE BLOOD QUANT: CPT

## 2023-11-21 PROCEDURE — 83735 ASSAY OF MAGNESIUM: CPT | Performed by: STUDENT IN AN ORGANIZED HEALTH CARE EDUCATION/TRAINING PROGRAM

## 2023-11-21 PROCEDURE — 96372 THER/PROPH/DIAG INJ SC/IM: CPT | Performed by: STUDENT IN AN ORGANIZED HEALTH CARE EDUCATION/TRAINING PROGRAM

## 2023-11-21 PROCEDURE — 36415 COLL VENOUS BLD VENIPUNCTURE: CPT | Performed by: STUDENT IN AN ORGANIZED HEALTH CARE EDUCATION/TRAINING PROGRAM

## 2023-11-21 PROCEDURE — 2500000002 HC RX 250 W HCPCS SELF ADMINISTERED DRUGS (ALT 637 FOR MEDICARE OP, ALT 636 FOR OP/ED): Performed by: STUDENT IN AN ORGANIZED HEALTH CARE EDUCATION/TRAINING PROGRAM

## 2023-11-21 RX ORDER — ONDANSETRON 4 MG/1
4 TABLET, FILM COATED ORAL EVERY 6 HOURS PRN
Qty: 10 TABLET | Refills: 1 | Status: SHIPPED | OUTPATIENT
Start: 2023-11-21 | End: 2023-12-19 | Stop reason: WASHOUT

## 2023-11-21 RX ORDER — METOCLOPRAMIDE 10 MG/1
10 TABLET ORAL ONCE
Status: COMPLETED | OUTPATIENT
Start: 2023-11-21 | End: 2023-11-21

## 2023-11-21 RX ORDER — OXYCODONE HYDROCHLORIDE 5 MG/1
5 TABLET ORAL EVERY 4 HOURS PRN
Qty: 10 TABLET | Refills: 0 | Status: SHIPPED | OUTPATIENT
Start: 2023-11-21 | End: 2023-12-19 | Stop reason: WASHOUT

## 2023-11-21 RX ORDER — ACETAMINOPHEN 325 MG/1
650 TABLET ORAL EVERY 6 HOURS
Status: DISCONTINUED | OUTPATIENT
Start: 2023-11-21 | End: 2023-11-21 | Stop reason: HOSPADM

## 2023-11-21 RX ORDER — METOCLOPRAMIDE HYDROCHLORIDE 5 MG/ML
10 INJECTION INTRAMUSCULAR; INTRAVENOUS ONCE
Status: COMPLETED | OUTPATIENT
Start: 2023-11-21 | End: 2023-11-21

## 2023-11-21 RX ORDER — DIPHENHYDRAMINE HCL 25 MG
25 CAPSULE ORAL EVERY 6 HOURS PRN
Status: DISCONTINUED | OUTPATIENT
Start: 2023-11-21 | End: 2023-11-21 | Stop reason: HOSPADM

## 2023-11-21 RX ORDER — ACETAMINOPHEN 325 MG/1
650 TABLET ORAL EVERY 6 HOURS PRN
Qty: 90 TABLET | Refills: 2 | Status: SHIPPED | OUTPATIENT
Start: 2023-11-21

## 2023-11-21 RX ORDER — AMOXICILLIN 250 MG
2 CAPSULE ORAL 2 TIMES DAILY
Qty: 90 TABLET | Refills: 2 | Status: SHIPPED | OUTPATIENT
Start: 2023-11-21 | End: 2024-02-19 | Stop reason: ALTCHOICE

## 2023-11-21 RX ADMIN — ROSUVASTATIN CALCIUM 20 MG: 20 TABLET, FILM COATED ORAL at 08:53

## 2023-11-21 RX ADMIN — ASPIRIN 81 MG: 81 TABLET, COATED ORAL at 08:52

## 2023-11-21 RX ADMIN — SENNOSIDES AND DOCUSATE SODIUM 2 TABLET: 8.6; 5 TABLET ORAL at 08:53

## 2023-11-21 RX ADMIN — METOCLOPRAMIDE 10 MG: 10 TABLET ORAL at 03:50

## 2023-11-21 RX ADMIN — ACETAMINOPHEN 650 MG: 325 TABLET ORAL at 03:49

## 2023-11-21 RX ADMIN — DIPHENHYDRAMINE HYDROCHLORIDE 25 MG: 25 CAPSULE ORAL at 03:50

## 2023-11-21 RX ADMIN — ENOXAPARIN SODIUM 40 MG: 100 INJECTION SUBCUTANEOUS at 06:53

## 2023-11-21 RX ADMIN — ACETAMINOPHEN 650 MG: 325 TABLET ORAL at 08:53

## 2023-11-21 RX ADMIN — PANTOPRAZOLE SODIUM 40 MG: 40 TABLET, DELAYED RELEASE ORAL at 08:53

## 2023-11-21 RX ADMIN — EZETIMIBE 10 MG: 10 TABLET ORAL at 08:54

## 2023-11-21 RX ADMIN — OXYCODONE HYDROCHLORIDE 5 MG: 5 TABLET ORAL at 10:33

## 2023-11-21 RX ADMIN — ATENOLOL 100 MG: 100 TABLET ORAL at 00:10

## 2023-11-21 RX ADMIN — ESCITALOPRAM OXALATE 10 MG: 10 TABLET ORAL at 08:53

## 2023-11-21 ASSESSMENT — PAIN SCALES - GENERAL
PAINLEVEL_OUTOF10: 6
PAINLEVEL_OUTOF10: 3
PAINLEVEL_OUTOF10: 4
PAINLEVEL_OUTOF10: 4

## 2023-11-21 ASSESSMENT — PAIN - FUNCTIONAL ASSESSMENT
PAIN_FUNCTIONAL_ASSESSMENT: 0-10

## 2023-11-21 NOTE — PROGRESS NOTES
Postop Pain HPI -   Palliative: relieved with IV analgesics and regional local anesthetics  Provocative: movement  Quality:  burning and aching  Radiation:  none  Severity:  5/10  Timing: constant    24-HOUR OPIOID CONSUMPTION:  Oxycodone 5mg    Scheduled medications  acetaminophen, 650 mg, oral, q6h  aspirin, 81 mg, oral, Daily  atenolol, 100 mg, oral, Daily  enoxaparin, 40 mg, subcutaneous, q24h  escitalopram, 10 mg, oral, Daily  ezetimibe, 10 mg, oral, Daily  [Held by provider] ibuprofen, 600 mg, oral, q6h  [Held by provider] ketorolac, 15 mg, intravenous, q6h  [Held by provider] lisinopril, 10 mg, oral, Daily  pantoprazole, 40 mg, oral, Daily  rosuvastatin, 20 mg, oral, Daily  sennosides-docusate sodium, 2 tablet, oral, BID      Continuous medications  lactated Ringer's, 40 mL/hr, Last Rate: 40 mL/hr (11/21/23 0433)      PRN medications  PRN medications: dextrose 10 % in water (D10W), dextrose, diphenhydrAMINE, glucagon, HYDROmorphone, hydrOXYzine HCL, naloxone, ondansetron, oxyCODONE, polyethylene glycol, simethicone     Physical Exam:  Constitutional:  no distress, alert and cooperative  Eyes: clear sclera  Head/Neck: No apparent injury, trachea midline  Respiratory/Thorax: Patent airways, thorax symmetric, breathing comfortably  Cardiovascular: no pitting edema  Gastrointestinal: Nondistended  Musculoskeletal: ROM intact  Extremities: no clubbing  Neurological: alert, gordillo x4  Psychological: Appropriate affect    Results for orders placed or performed during the hospital encounter of 11/20/23 (from the past 24 hour(s))   VERIFY ABO/Rh Group Test   Result Value Ref Range    ABO TYPE O     Rh TYPE POS    Magnesium   Result Value Ref Range    Magnesium 1.97 1.60 - 2.40 mg/dL   CBC   Result Value Ref Range    WBC 19.6 (H) 4.4 - 11.3 x10*3/uL    nRBC 0.0 0.0 - 0.0 /100 WBCs    RBC 4.31 4.00 - 5.20 x10*6/uL    Hemoglobin 14.3 12.0 - 16.0 g/dL    Hematocrit 46.2 (H) 36.0 - 46.0 %     (H) 80 - 100 fL    MCH  33.2 26.0 - 34.0 pg    MCHC 31.0 (L) 32.0 - 36.0 g/dL    RDW 14.5 11.5 - 14.5 %    Platelets 154 150 - 450 x10*3/uL   Basic Metabolic Panel   Result Value Ref Range    Glucose 172 (H) 74 - 99 mg/dL    Sodium 140 136 - 145 mmol/L    Potassium 4.1 3.5 - 5.3 mmol/L    Chloride 105 98 - 107 mmol/L    Bicarbonate 20 (L) 21 - 32 mmol/L    Anion Gap 19 10 - 20 mmol/L    Urea Nitrogen 18 6 - 23 mg/dL    Creatinine 0.75 0.50 - 1.05 mg/dL    eGFR 83 >60 mL/min/1.73m*2    Calcium 9.5 8.6 - 10.6 mg/dL   POCT GLUCOSE   Result Value Ref Range    POCT Glucose 146 (H) 74 - 99 mg/dL        Plan:     - B/l QL ss blocks performed preoperatively on 11/20  - Pain medications per primary team  - Acute pain to sign off       Acute Pain Team  pg [phone] ph [phone].        Acute Pain Team  pg [phone] ph [phone]

## 2023-11-21 NOTE — DISCHARGE SUMMARY
Discharge Diagnosis  Status post surgery    Issues Requiring Follow-Up  Post-operative state    Test Results Pending At Discharge  Pending Labs       Order Current Status    Surgical Pathology Exam In process            Hospital Course  Patient was admitted on 11/20 for scheduled surgery. She underwent a total Laparoscopic Hysterectomy with Bilateral Salpingo-Oophorectomy and Cystoscopy. See operative report for more details. She was admitted overnight in the setting of her age and history of sick sinus syndrome. She was monitored on telemetry overnight and by POD#1 she was meeting postoperative milestones including ambulating, voiding, tolerating diet. She was stable for discharge home with outpatient follow up with Dr Grimes     Pertinent Physical Exam At Time of Discharge  Physical Exam  Constitutional:       Appearance: Normal appearance.   HENT:      Head: Normocephalic.      Mouth/Throat:      Mouth: Mucous membranes are moist.   Eyes:      Pupils: Pupils are equal, round, and reactive to light.   Cardiovascular:      Rate and Rhythm: Normal rate and regular rhythm.      Pulses: Normal pulses.   Abdominal:      General: There is no distension.      Palpations: Abdomen is soft.      Tenderness: There is no guarding or rebound.      Comments: Appropriate post op tenderness, incision sites c/d/I    Musculoskeletal:      Cervical back: Normal range of motion.   Skin:     General: Skin is warm and dry.   Neurological:      General: No focal deficit present.      Mental Status: She is alert.   Psychiatric:         Mood and Affect: Mood normal.         Judgment: Judgment normal.         Home Medications     Medication List      START taking these medications     acetaminophen 325 mg tablet; Commonly known as: Tylenol; Take 2 tablets   (650 mg) by mouth every 6 hours if needed for mild pain (1 - 3).   ondansetron 4 mg tablet; Commonly known as: Zofran; Take 1 tablet (4 mg)   by mouth every 6 hours if needed for  nausea.   oxyCODONE 5 mg immediate release tablet; Commonly known as: Roxicodone;   Take 1 tablet (5 mg) by mouth every 4 hours if needed for severe pain (7 -   10).   sennosides-docusate sodium 8.6-50 mg tablet; Commonly known as:   Nanda-Colace; Take 2 tablets by mouth 2 times a day. Until first bowel   movement after surgery     CONTINUE taking these medications     anastrozole 1 mg tablet; Commonly known as: Arimidex   aspirin 81 mg EC tablet   atenolol 100 mg tablet; Commonly known as: Tenormin   cholecalciferol 50 mcg (2,000 unit) capsule; Commonly known as: Vitamin   D-3   escitalopram 10 mg tablet; Commonly known as: Lexapro; Take 1 tablet (10   mg) by mouth once daily.   hydrOXYzine HCL 25 mg tablet; Commonly known as: Atarax   lisinopril 10 mg tablet   pantoprazole 40 mg EC tablet; Commonly known as: ProtoNix; Take 1 tablet   (40 mg) by mouth once daily.   rosuvastatin 20 mg tablet; Commonly known as: Crestor; Take 1 tablet (20   mg) by mouth once daily.   VITAMIN B-12 ORAL   vitamin E 180 mg (400 unit) capsule     STOP taking these medications     ezetimibe 10 mg tablet; Commonly known as: Zetia       Outpatient Follow-Up  Future Appointments   Date Time Provider Department [place]   11/28/2023  1:30 PM Nancy Chavarria MD [id number] [place]   12/19/2023  1:00 PM Dina Grimes MD [id number] [facility] Dr Chintan Koehler MD  OB/GYN PGY3

## 2023-11-21 NOTE — PROGRESS NOTES
Gynecology Progress Note    Assessment/Plan   Darline Gonzalez is a 73 y.o., who is POD#1 s/p total laparoscopic hysterectomy, BSO and cystoscopy in s/o uterine fibroids     Post-operative State   -Pain regimen: Tylenol, Oxycodone, Dilaudid for breakthrough pain . Ibuprofen and Toradol held in s/o Cr 0.8   - Hot packs and Abdomen binder for supportive care   - Day removed. Voiding. Monitor I/Os  - Administer Hgb 15.1 -> EBL 10cc -> 2200 labs pending   - Continue to ambulate   - AC: SCDs and lovenox ppx   - Fluid: LR 40cc/hr     Acute HA  -Given BP's 180's/80's headache possibly in s/o elevated Bps vs unknown etiology   - Will give headache cocktail: Tylenol, Reglan and Benadryl and continue to monitor     Co-morbitidies  HTN: Lisinopril held. Continue home Atenolol   TRINA: uses CPAP at home, currently on 3L NC while inpatient   T2DM: ISS   CAD: Continue Eczetimbe 10mg and Rosuvstatin 20mg  Anxiety: continue Lexapro 10mg and Atarax 25mg   GERD: Continue protonix 40mg     Dispo: continue inpatient management until meeting post-operative milestones    Allie Khan MD, PGY-2     Principal Problem:    Status post surgery    Problem List       No episode was linked to this visit.          Subjective   Late entry: pt is having moderate abdominal cramping and soreness and has 8/10 HA.  Feels overall fatigue. Tolerating PO intake. Denies N/V, CP and SOB. Has mild lochia. Ambulating with assistance. Hasn't passed flatus yet.     Objective   Allergies:   Latex, Other, and Sulfa (sulfonamide antibiotics)         Last Vitals:  Temp Pulse Resp BP MAP Pulse Ox   37.2 °C (99 °F) 99 18 (!) 188/87 (team notified)   94 %     Vitals Min/Max Last 24 Hours:  Temp  Min: 36.1 °C (97 °F)  Max: 37.3 °C (99.1 °F)  Pulse  Min: 65  Max: 99  Resp  Min: 13  Max: 20  BP  Min: 128/60  Max: 196/91    Intake/Output:     Intake/Output Summary (Last 24 hours) at 11/21/2023 0105  Last data filed at 11/20/2023 2257  Gross per 24 hour   Intake 959.6  "ml   Output 1010 ml   Net -50.4 ml       Physical Exam:  General: Examination reveals a well developed, well nourished, female, fatigue appearing. She is alert and cooperative.  Lungs: clear to auscultation bilaterally and , satting 94% SpO2 on .  Cardiac: regular rate and rhythm, S1, S2 normal, no murmur, click, rub or gallop.  Abdomen: soft, appropriate tenderness, active bowel sounds, port site incision covered with dressing, no shadowing noted.  Extremities: no focal deficits, no calf tenderness.  Neurological: alert, oriented, normal speech, no focal findings or movement disorder noted.  Psychological: awake and alert; oriented to person, place, and time.    Lab Data:  No results found for: \"WBC\", \"HGB\", \"HCT\", \"PLT\"  No results found for: \"GLUCOSE\", \"NA\", \"K\", \"CL\", \"CO2\", \"ANIONGAP\", \"BUN\", \"CREATININE\", \"EGFR\", \"CALCIUM\", \"ALBUMIN\", \"PROT\", \"ALKPHOS\", \"ALT\", \"AST\", \"BILITOT\"  "

## 2023-11-21 NOTE — NURSING NOTE
Darline Gonzalez discharged at 2:43 PM  and 11/21/23   Patient discharged home via car .  Discharge education and teaching completed with Darline Gonzalez   RN signature: Segun Chavez

## 2023-11-21 NOTE — PROGRESS NOTES
"Darline Gonzalez is a 73 y.o. female on day 0 of admission presenting with Status post surgery.    Subjective   Pain improved, ambulating, voiding spontaneously, tolerating diet, passing flatus        Objective   General: Examination reveals a well developed, well nourished, female, She is alert and cooperative.  Lungs: clear, satting well on room air   Cardiac: regular rate   Abdomen: soft, appropriate tenderness, incision c/d/i  Extremities: no focal deficits, no calf tenderness.  Neurological: alert, oriented, normal speech, no focal findings or movement disorder noted.  Psychological: awake and alert; oriented to person, place, and time.    Last Recorded Vitals  Blood pressure 144/71, pulse 68, temperature 36.3 °C (97.3 °F), temperature source Temporal, resp. rate 18, height 1.727 m (5' 8\"), weight 93.5 kg (206 lb 2.1 oz), SpO2 93 %.  Intake/Output last 3 Shifts:  I/O last 3 completed shifts:  In: 1336.3 (14.3 mL/kg) [P.O.:120; I.V.:1216.3 (13 mL/kg)]  Out: 1510 (16.1 mL/kg) [Urine:1500 (0.4 mL/kg/hr); Blood:10]  Weight: 93.5 kg     Relevant Results  Results for orders placed or performed during the hospital encounter of 11/20/23 (from the past 24 hour(s))   VERIFY ABO/Rh Group Test   Result Value Ref Range    ABO TYPE O     Rh TYPE POS    Magnesium   Result Value Ref Range    Magnesium 1.97 1.60 - 2.40 mg/dL   CBC   Result Value Ref Range    WBC 19.6 (H) 4.4 - 11.3 x10*3/uL    nRBC 0.0 0.0 - 0.0 /100 WBCs    RBC 4.31 4.00 - 5.20 x10*6/uL    Hemoglobin 14.3 12.0 - 16.0 g/dL    Hematocrit 46.2 (H) 36.0 - 46.0 %     (H) 80 - 100 fL    MCH 33.2 26.0 - 34.0 pg    MCHC 31.0 (L) 32.0 - 36.0 g/dL    RDW 14.5 11.5 - 14.5 %    Platelets 154 150 - 450 x10*3/uL   Basic Metabolic Panel   Result Value Ref Range    Glucose 172 (H) 74 - 99 mg/dL    Sodium 140 136 - 145 mmol/L    Potassium 4.1 3.5 - 5.3 mmol/L    Chloride 105 98 - 107 mmol/L    Bicarbonate 20 (L) 21 - 32 mmol/L    Anion Gap 19 10 - 20 mmol/L    Urea " Nitrogen 18 6 - 23 mg/dL    Creatinine 0.75 0.50 - 1.05 mg/dL    eGFR 83 >60 mL/min/1.73m*2    Calcium 9.5 8.6 - 10.6 mg/dL   POCT GLUCOSE   Result Value Ref Range    POCT Glucose 146 (H) 74 - 99 mg/dL          Assessment/Plan   Principal Problem:    Status post surgery  Darline Gonzalez is a 73 y.o., who is POD#1 s/p total laparoscopic hysterectomy, BSO and cystoscopy in s/o uterine fibroids      Post-operative State   -Pain regimen: Tylenol, Oxycodone, Dilaudid for breakthrough pain . Ibuprofen and Toradol held in s/o Cr 0.8   - Hot packs and Abdomen binder for supportive care   - Day removed. Voiding. Monitor I/Os  - Hgb 15.1 -> EBL 10cc -> 14.3, appropriate drop   - Continue to ambulate   - AC: SCDs and lovenox ppx   - Fluid: LR 40cc/hr      Acute HA  - resolved after Tylenol, Reglan and Benadryl      Co-morbitidies  HTN: Lisinopril held. Continue home Atenolol   TRINA: uses CPAP at home, currently on 3L NC while inpatient   T2DM: ISS   CAD: Continue Eczetimbe 10mg and Rosuvstatin 20mg  Anxiety: continue Lexapro 10mg and Atarax 25mg   GERD: Continue protonix 40mg   SSS: tele,      Dispo: continue inpatient management until meeting post-operative milestones, anticipate discharge later today     Pt dw Dr Chintan Koehler MD  OB/GYN PGY3   [id number]

## 2023-11-21 NOTE — PROGRESS NOTES
Darline Gonzalez is a 73 y.o. female on day 0 of admission presenting with Status post surgery.    TCC Note    Plan per Medical/Surgical Team: s/p Total Lap Hysterectomy with Mike JUDD and cystoscopy. Medically ready for discharge  Status: Inpatient  Payor Source: Medicare  Discharge disposition: Home no needs  Expected date of discharge: 11/21/23  Barriers: None

## 2023-11-21 NOTE — PROGRESS NOTES
Pharmacy Medication History Review    Darline Gonzalez is a 73 y.o. female admitted for Status post surgery. Pharmacy reviewed the patient's [id number] medications and allergies for accuracy.    The list below reflects the updated PTA list.   Comments regarding how patient may be taking medications differently can be found in the Admit Orders Activity  Prior to Admission Medications   Prescriptions Last Dose Informant Patient Reported?   aspirin 81 mg EC tablet Past Week Self Yes   Sig: Take 1 tablet (81 mg) by mouth once daily.   cholecalciferol (Vitamin D-3) 50 mcg (2,000 unit) capsule 11/19/2023 Self Yes   Sig: Take 1 capsule (50 mcg) by mouth once daily.   cyanocobalamin (Vitamin B-12) 1,000 mcg tablet 11/19/2023 Self Yes   Sig: Take 1 tablet (1,000 mcg) by mouth once daily.   escitalopram (Lexapro) 10 mg tablet 11/20/2023 Self No   Sig: Take 1 tablet (10 mg) by mouth once daily.   lisinopril 10 mg tablet 11/19/2023 Self Yes   Sig: Take 1 tablet (10 mg) by mouth once daily.   pantoprazole (ProtoNix) 40 mg EC tablet 11/20/2023 Self No   Sig: Take 1 tablet (40 mg) by mouth once daily.   rosuvastatin (Crestor) 20 mg tablet 11/19/2023 Self No   Sig: Take 1 tablet (20 mg) by mouth once daily.      Facility-Administered Medications: None        The list below reflects the updated allergy list. Please review each documented allergy for additional clarification and justification.  Allergies  Reviewed by Bennett Finley RPh on 11/21/2023        Severity Reactions Comments    Latex Not Specified Unknown     Sulfa (sulfonamide Antibiotics) Not Specified Itching             Patient accepts M2B at discharge. Pharmacy has been updated to [facility].    Sources:   Pt interview   Dispense Hx  8/21/23 Primary Care Office Visit (Dr. Downey)    Additional Comments:  Pt does not have a primary care provider anymore. Past PCP, Dr. Downey left  to [facility].        Bennett Finley PharmD  Transitions of Care Pharmacist    Reach out via [employer]  "Chat for questions,   if no response call [phone] or Vocera \"Med Rec\"   "

## 2023-11-28 ENCOUNTER — APPOINTMENT (OUTPATIENT)
Dept: CARDIOLOGY | Facility: CLINIC | Age: 73
End: 2023-11-28
Payer: MEDICARE

## 2023-11-30 ENCOUNTER — TELEPHONE (OUTPATIENT)
Dept: OBSTETRICS AND GYNECOLOGY | Facility: CLINIC | Age: 73
End: 2023-11-30
Payer: MEDICARE

## 2023-12-01 ENCOUNTER — APPOINTMENT (OUTPATIENT)
Dept: CARDIOLOGY | Facility: CLINIC | Age: 73
End: 2023-12-01
Payer: MEDICARE

## 2023-12-01 LAB
LABORATORY COMMENT REPORT: NORMAL
PATH REPORT.FINAL DX SPEC: NORMAL
PATH REPORT.GROSS SPEC: NORMAL
PATH REPORT.RELEVANT HX SPEC: NORMAL
PATH REPORT.TOTAL CANCER: NORMAL

## 2023-12-05 ENCOUNTER — HOSPITAL ENCOUNTER (OUTPATIENT)
Dept: CARDIOLOGY | Facility: CLINIC | Age: 73
Discharge: HOME | End: 2023-12-05
Payer: MEDICARE

## 2023-12-05 DIAGNOSIS — I49.5 SICK SINUS SYNDROME DUE TO SA NODE DYSFUNCTION (MULTI): ICD-10-CM

## 2023-12-05 DIAGNOSIS — Z95.0 CARDIAC PACEMAKER IN SITU: ICD-10-CM

## 2023-12-13 NOTE — PROGRESS NOTES
"Division of Minimally Invasive Gynecologic Surgery  [facility]    12/13/23 Gynecology Visit    Darline Gonzalez is a 73 y.o. status post TLH-BSO on 11/20 presents for post op check.     Overall recovering well, meeting all milestones. Decreased nocturia (maybe once or twice per night, but she thinks this is because she just wakes up spontaneously and not because she has to void). She is having some loss of urine however intermittently. She also has intermittent spotting.     PMHx, PSHx, SHx, Allergies, and Medications updated in Epic.    ROS: reviewed and negative    PE:/80   Pulse 74   Ht 1.727 m (5' 8\")   Wt 91.2 kg (201 lb)   BMI 30.56 kg/m²     Constitutional:  No acute distress  HEENT: EOM grossly intact, MMM, neck supple and with full ROM  Pulm:  Effort normal. No accessory muscle usage.  No respiratory distress.  Neurological:  AAO x 3, appropriate to interview  Skin: Warm, no pallor.  Psychiatric:  normal mood and affect.    Assessment/Plan:   Darline Gonzalez is a 73 y.o. status post TLH-BSO on 11/20 presents for post op check.   - Recovering well, no concerns  - Path reviewed, benign  - Continue post op restrictions until 6 weeks after surgery, reviewed these today  - RTC in one month for cuff check   - UA/Ucx today for urinary incontinence       Dina Grimes MD  Division of Minimally Invasive Gynecologic Surgery  [facility]    "

## 2023-12-19 ENCOUNTER — OFFICE VISIT (OUTPATIENT)
Dept: OBSTETRICS AND GYNECOLOGY | Facility: CLINIC | Age: 73
End: 2023-12-19
Payer: MEDICARE

## 2023-12-19 VITALS
WEIGHT: 201 LBS | HEART RATE: 74 BPM | DIASTOLIC BLOOD PRESSURE: 80 MMHG | HEIGHT: 68 IN | BODY MASS INDEX: 30.46 KG/M2 | SYSTOLIC BLOOD PRESSURE: 125 MMHG

## 2023-12-19 DIAGNOSIS — R30.0 DYSURIA: ICD-10-CM

## 2023-12-19 DIAGNOSIS — Z48.89 POSTOPERATIVE VISIT: Primary | ICD-10-CM

## 2023-12-19 PROCEDURE — 3074F SYST BP LT 130 MM HG: CPT | Performed by: STUDENT IN AN ORGANIZED HEALTH CARE EDUCATION/TRAINING PROGRAM

## 2023-12-19 PROCEDURE — 87086 URINE CULTURE/COLONY COUNT: CPT | Performed by: STUDENT IN AN ORGANIZED HEALTH CARE EDUCATION/TRAINING PROGRAM

## 2023-12-19 PROCEDURE — 1126F AMNT PAIN NOTED NONE PRSNT: CPT | Performed by: STUDENT IN AN ORGANIZED HEALTH CARE EDUCATION/TRAINING PROGRAM

## 2023-12-19 PROCEDURE — 1160F RVW MEDS BY RX/DR IN RCRD: CPT | Performed by: STUDENT IN AN ORGANIZED HEALTH CARE EDUCATION/TRAINING PROGRAM

## 2023-12-19 PROCEDURE — 99024 POSTOP FOLLOW-UP VISIT: CPT | Performed by: STUDENT IN AN ORGANIZED HEALTH CARE EDUCATION/TRAINING PROGRAM

## 2023-12-19 PROCEDURE — 1036F TOBACCO NON-USER: CPT | Performed by: STUDENT IN AN ORGANIZED HEALTH CARE EDUCATION/TRAINING PROGRAM

## 2023-12-19 PROCEDURE — 3079F DIAST BP 80-89 MM HG: CPT | Performed by: STUDENT IN AN ORGANIZED HEALTH CARE EDUCATION/TRAINING PROGRAM

## 2023-12-19 PROCEDURE — 1159F MED LIST DOCD IN RCRD: CPT | Performed by: STUDENT IN AN ORGANIZED HEALTH CARE EDUCATION/TRAINING PROGRAM

## 2023-12-19 ASSESSMENT — ENCOUNTER SYMPTOMS
GASTROINTESTINAL NEGATIVE: 0
NEUROLOGICAL NEGATIVE: 0
ALLERGIC/IMMUNOLOGIC NEGATIVE: 0
CARDIOVASCULAR NEGATIVE: 0
ENDOCRINE NEGATIVE: 0
PSYCHIATRIC NEGATIVE: 0
CONSTITUTIONAL NEGATIVE: 0
HEMATOLOGIC/LYMPHATIC NEGATIVE: 0
EYES NEGATIVE: 0
RESPIRATORY NEGATIVE: 0
MUSCULOSKELETAL NEGATIVE: 0

## 2023-12-19 ASSESSMENT — PAIN SCALES - GENERAL: PAINLEVEL: 0-NO PAIN

## 2023-12-21 LAB — BACTERIA UR CULT: NORMAL

## 2024-01-18 ENCOUNTER — APPOINTMENT (OUTPATIENT)
Dept: OBSTETRICS AND GYNECOLOGY | Facility: CLINIC | Age: 74
End: 2024-01-18
Payer: MEDICARE

## 2024-01-23 ENCOUNTER — TELEPHONE (OUTPATIENT)
Dept: PRIMARY CARE | Facility: CLINIC | Age: 74
End: 2024-01-23
Payer: MEDICARE

## 2024-01-23 DIAGNOSIS — K21.00 GASTROESOPHAGEAL REFLUX DISEASE WITH ESOPHAGITIS WITHOUT HEMORRHAGE: ICD-10-CM

## 2024-01-23 DIAGNOSIS — F41.1 GENERALIZED ANXIETY DISORDER: ICD-10-CM

## 2024-01-23 RX ORDER — ESCITALOPRAM OXALATE 10 MG/1
10 TABLET ORAL DAILY
Qty: 30 TABLET | Refills: 0 | Status: SHIPPED | OUTPATIENT
Start: 2024-01-23 | End: 2024-03-06 | Stop reason: SDUPTHER

## 2024-01-23 RX ORDER — PANTOPRAZOLE SODIUM 40 MG/1
40 TABLET, DELAYED RELEASE ORAL DAILY
Qty: 30 TABLET | Refills: 0 | Status: SHIPPED | OUTPATIENT
Start: 2024-01-23 | End: 2024-03-06 | Stop reason: SDUPTHER

## 2024-01-23 NOTE — TELEPHONE ENCOUNTER
Patient has appointment with Dr. Estrada on 2/19; but needs two meds refilled.  Can you give her 30 days.

## 2024-02-01 ENCOUNTER — OFFICE VISIT (OUTPATIENT)
Dept: OBSTETRICS AND GYNECOLOGY | Facility: CLINIC | Age: 74
End: 2024-02-01
Payer: MEDICARE

## 2024-02-01 VITALS
HEART RATE: 69 BPM | WEIGHT: 201 LBS | BODY MASS INDEX: 30.46 KG/M2 | DIASTOLIC BLOOD PRESSURE: 68 MMHG | HEIGHT: 68 IN | SYSTOLIC BLOOD PRESSURE: 127 MMHG

## 2024-02-01 DIAGNOSIS — Z48.89 POSTOPERATIVE VISIT: Primary | ICD-10-CM

## 2024-02-01 PROCEDURE — 99024 POSTOP FOLLOW-UP VISIT: CPT | Performed by: STUDENT IN AN ORGANIZED HEALTH CARE EDUCATION/TRAINING PROGRAM

## 2024-02-01 PROCEDURE — 1159F MED LIST DOCD IN RCRD: CPT | Performed by: STUDENT IN AN ORGANIZED HEALTH CARE EDUCATION/TRAINING PROGRAM

## 2024-02-01 PROCEDURE — 1126F AMNT PAIN NOTED NONE PRSNT: CPT | Performed by: STUDENT IN AN ORGANIZED HEALTH CARE EDUCATION/TRAINING PROGRAM

## 2024-02-01 PROCEDURE — 3078F DIAST BP <80 MM HG: CPT | Performed by: STUDENT IN AN ORGANIZED HEALTH CARE EDUCATION/TRAINING PROGRAM

## 2024-02-01 PROCEDURE — 1036F TOBACCO NON-USER: CPT | Performed by: STUDENT IN AN ORGANIZED HEALTH CARE EDUCATION/TRAINING PROGRAM

## 2024-02-01 PROCEDURE — 3074F SYST BP LT 130 MM HG: CPT | Performed by: STUDENT IN AN ORGANIZED HEALTH CARE EDUCATION/TRAINING PROGRAM

## 2024-02-01 ASSESSMENT — PAIN SCALES - GENERAL: PAINLEVEL: 0-NO PAIN

## 2024-02-01 NOTE — PROGRESS NOTES
"Division of Minimally Invasive Gynecologic Surgery  [facility]    02/01/24 Gynecology Visit    Darline Gonzalez is a 74 y.o. status post TLH-BSO on 11/20 presents for post op check.     Overall recovering well, meeting all milestones. Bleeding has stopped. Rare occasional right sided pain that is relieved by Tylenol. Still some urinary issues, but overall improving.     PMHx, PSHx, SHx, Allergies, and Medications updated in Epic.    ROS: reviewed and negative    PE:/68   Pulse 69   Ht 1.727 m (5' 8\")   Wt 91.2 kg (201 lb)   BMI 30.56 kg/m²     Constitutional:  No acute distress  HEENT: EOM grossly intact, MMM, neck supple and with full ROM  Pulm:  Effort normal. No accessory muscle usage.  No respiratory distress.  Neurological:  AAO x 3, appropriate to interview  Skin: Warm, no pallor.  Psychiatric:  normal mood and affect.    Assessment/Plan:   Darline Gonzalez is a 74 y.o. status post TLH-BSO on 11/20 presents for post op check.   - Recovering well, no concerns  - Path reviewed, benign  - Pt elected to defer cuff check, I am comfortable with this   - Cleared from a surgical perspective       Dina Grimes MD  Division of Minimally Invasive Gynecologic Surgery  [facility]  "

## 2024-02-14 ENCOUNTER — TELEPHONE (OUTPATIENT)
Dept: CARDIOLOGY | Facility: CLINIC | Age: 74
End: 2024-02-14
Payer: MEDICARE

## 2024-02-14 DIAGNOSIS — I49.5 SICK SINUS SYNDROME DUE TO SA NODE DYSFUNCTION (MULTI): ICD-10-CM

## 2024-02-14 DIAGNOSIS — Z95.0 CARDIAC PACEMAKER IN SITU: ICD-10-CM

## 2024-02-14 NOTE — TELEPHONE ENCOUNTER
"Pt called, states that yesterday she had an \"episode\" at the store yesterday. Symptoms include cold sweat, heavy breathing, diarrhea and pain between shoulder blades. She also said that for the last 7 days, she has occasional breathlessness. She said that prior to ppm she had similar symptoms. She had a ppm placed in 8/2023. I did not see any alert on latitude. I asked her to send a transmission today. She does not check BP at home. She takes lisin 20 daily, asa and rosuva 20. She also saw a commercial for HCM and thinks she may have it. She is also followed by [facility], next ov is 3/1. She denies illness. Last echo was 5/2022 showed mod LVH, ef 55-60% and impaired relaxation. Last stress was 5/2021 and negative, EF was 53%. Pt feels ok today.    Message sent to Dr Ramicone. He doesn't know what to make of pt's symptoms. He thinks it is unlikely pt has HCM.     Pt will be sending a pacemaker transmission.   "

## 2024-02-14 NOTE — TELEPHONE ENCOUNTER
Pt sent transmission, reviewed with device clinic and Dr Ramicone. No events or abnormal heart rhythms seen. Dr Ramicone doesn't believe this was a cardiac event, unless BP dropped. Pt notified. Follow up as scheduled.

## 2024-02-15 ENCOUNTER — HOSPITAL ENCOUNTER (OUTPATIENT)
Dept: CARDIOLOGY | Facility: CLINIC | Age: 74
Discharge: HOME | End: 2024-02-15
Payer: MEDICARE

## 2024-02-15 DIAGNOSIS — Z95.0 CARDIAC PACEMAKER IN SITU: ICD-10-CM

## 2024-02-15 DIAGNOSIS — I49.5 SICK SINUS SYNDROME DUE TO SA NODE DYSFUNCTION (MULTI): ICD-10-CM

## 2024-02-15 PROCEDURE — 93294 REM INTERROG EVL PM/LDLS PM: CPT | Performed by: INTERNAL MEDICINE

## 2024-02-15 PROCEDURE — 93296 REM INTERROG EVL PM/IDS: CPT

## 2024-02-19 ENCOUNTER — LAB (OUTPATIENT)
Dept: LAB | Facility: LAB | Age: 74
End: 2024-02-19
Payer: MEDICARE

## 2024-02-19 ENCOUNTER — OFFICE VISIT (OUTPATIENT)
Dept: PRIMARY CARE | Facility: CLINIC | Age: 74
End: 2024-02-19
Payer: MEDICARE

## 2024-02-19 VITALS
DIASTOLIC BLOOD PRESSURE: 63 MMHG | BODY MASS INDEX: 30.11 KG/M2 | TEMPERATURE: 97.7 F | WEIGHT: 198 LBS | SYSTOLIC BLOOD PRESSURE: 123 MMHG | HEART RATE: 67 BPM | OXYGEN SATURATION: 95 %

## 2024-02-19 DIAGNOSIS — E78.00 HYPERCHOLESTEROLEMIA: ICD-10-CM

## 2024-02-19 DIAGNOSIS — R73.9 HYPERGLYCEMIA: ICD-10-CM

## 2024-02-19 DIAGNOSIS — I10 BENIGN ESSENTIAL HYPERTENSION: ICD-10-CM

## 2024-02-19 DIAGNOSIS — Z76.89 ENCOUNTER TO ESTABLISH CARE: ICD-10-CM

## 2024-02-19 DIAGNOSIS — Z12.31 ENCOUNTER FOR SCREENING MAMMOGRAM FOR BREAST CANCER: Primary | ICD-10-CM

## 2024-02-19 LAB
ALBUMIN SERPL BCP-MCNC: 4.6 G/DL (ref 3.4–5)
ALP SERPL-CCNC: 66 U/L (ref 33–136)
ALT SERPL W P-5'-P-CCNC: 30 U/L (ref 7–45)
ANION GAP SERPL CALC-SCNC: 16 MMOL/L (ref 10–20)
AST SERPL W P-5'-P-CCNC: 27 U/L (ref 9–39)
BASOPHILS # BLD AUTO: 0.04 X10*3/UL (ref 0–0.1)
BASOPHILS NFR BLD AUTO: 0.5 %
BILIRUB SERPL-MCNC: 0.9 MG/DL (ref 0–1.2)
BUN SERPL-MCNC: 21 MG/DL (ref 6–23)
CALCIUM SERPL-MCNC: 10.3 MG/DL (ref 8.6–10.6)
CHLORIDE SERPL-SCNC: 106 MMOL/L (ref 98–107)
CHOLEST SERPL-MCNC: 155 MG/DL (ref 0–199)
CHOLESTEROL/HDL RATIO: 4.1
CO2 SERPL-SCNC: 25 MMOL/L (ref 21–32)
CREAT SERPL-MCNC: 1.01 MG/DL (ref 0.5–1.05)
EGFRCR SERPLBLD CKD-EPI 2021: 58 ML/MIN/1.73M*2
EOSINOPHIL # BLD AUTO: 0.22 X10*3/UL (ref 0–0.4)
EOSINOPHIL NFR BLD AUTO: 2.9 %
ERYTHROCYTE [DISTWIDTH] IN BLOOD BY AUTOMATED COUNT: 13.4 % (ref 11.5–14.5)
EST. AVERAGE GLUCOSE BLD GHB EST-MCNC: 123 MG/DL
GLUCOSE SERPL-MCNC: 107 MG/DL (ref 74–99)
HBA1C MFR BLD: 5.9 %
HCT VFR BLD AUTO: 47 % (ref 36–46)
HDLC SERPL-MCNC: 38 MG/DL
HGB BLD-MCNC: 15 G/DL (ref 12–16)
IMM GRANULOCYTES # BLD AUTO: 0.02 X10*3/UL (ref 0–0.5)
IMM GRANULOCYTES NFR BLD AUTO: 0.3 % (ref 0–0.9)
LDLC SERPL CALC-MCNC: 81 MG/DL
LYMPHOCYTES # BLD AUTO: 2.14 X10*3/UL (ref 0.8–3)
LYMPHOCYTES NFR BLD AUTO: 28.7 %
MCH RBC QN AUTO: 31.8 PG (ref 26–34)
MCHC RBC AUTO-ENTMCNC: 31.9 G/DL (ref 32–36)
MCV RBC AUTO: 100 FL (ref 80–100)
MONOCYTES # BLD AUTO: 0.83 X10*3/UL (ref 0.05–0.8)
MONOCYTES NFR BLD AUTO: 11.1 %
NEUTROPHILS # BLD AUTO: 4.21 X10*3/UL (ref 1.6–5.5)
NEUTROPHILS NFR BLD AUTO: 56.5 %
NON HDL CHOLESTEROL: 117 MG/DL (ref 0–149)
NRBC BLD-RTO: 0 /100 WBCS (ref 0–0)
PLATELET # BLD AUTO: 179 X10*3/UL (ref 150–450)
POTASSIUM SERPL-SCNC: 4.1 MMOL/L (ref 3.5–5.3)
PROT SERPL-MCNC: 7.4 G/DL (ref 6.4–8.2)
RBC # BLD AUTO: 4.72 X10*6/UL (ref 4–5.2)
SODIUM SERPL-SCNC: 143 MMOL/L (ref 136–145)
T4 FREE SERPL-MCNC: 0.82 NG/DL (ref 0.78–1.48)
TRIGL SERPL-MCNC: 179 MG/DL (ref 0–149)
TSH SERPL-ACNC: 4.35 MIU/L (ref 0.44–3.98)
VIT B12 SERPL-MCNC: 1092 PG/ML (ref 211–911)
VLDL: 36 MG/DL (ref 0–40)
WBC # BLD AUTO: 7.5 X10*3/UL (ref 4.4–11.3)

## 2024-02-19 PROCEDURE — 84439 ASSAY OF FREE THYROXINE: CPT

## 2024-02-19 PROCEDURE — 3078F DIAST BP <80 MM HG: CPT | Performed by: INTERNAL MEDICINE

## 2024-02-19 PROCEDURE — 99214 OFFICE O/P EST MOD 30 MIN: CPT | Performed by: INTERNAL MEDICINE

## 2024-02-19 PROCEDURE — 3074F SYST BP LT 130 MM HG: CPT | Performed by: INTERNAL MEDICINE

## 2024-02-19 PROCEDURE — 80053 COMPREHEN METABOLIC PANEL: CPT

## 2024-02-19 PROCEDURE — 80061 LIPID PANEL: CPT

## 2024-02-19 PROCEDURE — 83036 HEMOGLOBIN GLYCOSYLATED A1C: CPT

## 2024-02-19 PROCEDURE — 1160F RVW MEDS BY RX/DR IN RCRD: CPT | Performed by: INTERNAL MEDICINE

## 2024-02-19 PROCEDURE — 1159F MED LIST DOCD IN RCRD: CPT | Performed by: INTERNAL MEDICINE

## 2024-02-19 PROCEDURE — 1126F AMNT PAIN NOTED NONE PRSNT: CPT | Performed by: INTERNAL MEDICINE

## 2024-02-19 PROCEDURE — 85025 COMPLETE CBC W/AUTO DIFF WBC: CPT

## 2024-02-19 PROCEDURE — 1036F TOBACCO NON-USER: CPT | Performed by: INTERNAL MEDICINE

## 2024-02-19 PROCEDURE — 82607 VITAMIN B-12: CPT

## 2024-02-19 PROCEDURE — 36415 COLL VENOUS BLD VENIPUNCTURE: CPT

## 2024-02-19 PROCEDURE — 84443 ASSAY THYROID STIM HORMONE: CPT

## 2024-02-19 RX ORDER — ROSUVASTATIN CALCIUM 20 MG/1
20 TABLET, COATED ORAL
Qty: 90 TABLET | Refills: 1 | Status: SHIPPED | OUTPATIENT
Start: 2024-02-19

## 2024-02-19 RX ORDER — LISINOPRIL 10 MG/1
10 TABLET ORAL
Qty: 90 TABLET | Refills: 1 | Status: SHIPPED | OUTPATIENT
Start: 2024-02-19 | End: 2024-08-17

## 2024-02-19 ASSESSMENT — PATIENT HEALTH QUESTIONNAIRE - PHQ9
1. LITTLE INTEREST OR PLEASURE IN DOING THINGS: NOT AT ALL
2. FEELING DOWN, DEPRESSED OR HOPELESS: NOT AT ALL
SUM OF ALL RESPONSES TO PHQ9 QUESTIONS 1 & 2: 0

## 2024-02-19 ASSESSMENT — COLUMBIA-SUICIDE SEVERITY RATING SCALE - C-SSRS
6. HAVE YOU EVER DONE ANYTHING, STARTED TO DO ANYTHING, OR PREPARED TO DO ANYTHING TO END YOUR LIFE?: NO
2. HAVE YOU ACTUALLY HAD ANY THOUGHTS OF KILLING YOURSELF?: NO
1. IN THE PAST MONTH, HAVE YOU WISHED YOU WERE DEAD OR WISHED YOU COULD GO TO SLEEP AND NOT WAKE UP?: NO

## 2024-02-19 NOTE — RESULT ENCOUNTER NOTE
Reviewed your test results.  TSH is slightly high but free thyroid levels are okay.  No need for medication  B12 level is too high.  Take B12 orally 3 times a week not daily  Continue to improve hydration  Blood sugar control is acceptable and cholesterol is okay.  Continue same medications for now  We will discuss more at your visit

## 2024-02-19 NOTE — PROGRESS NOTES
Subjective   Patient ID: Darline Gonzalez is a 74 y.o. female who presents for Follow-up (Old patient of dr white).    HPI   Patient is seen here to establish care  She was diagnosed with sick sinus syndrome and has a pacemaker.  Recently had an episode of dizziness and diarrhea and talked to cardiology.  Had pacemaker evaluation.  Does not think it is related to her cardiac issue  She was in the mall and has not had much food or drinks prior to this.  She has also lost some weight after her GYN surgery  Taking lisinopril  No cough chest pain or shortness of breath    Pelvic and hip pain resolved after hysterectomy  She has longstanding depression.  Continues to have some symptoms .  She is  .  Her adult son lost job and moved in with her as well  She would like to talk to a counselor  On Lexapro  No major symptoms or suicidal thoughts    On statin.  Tolerating without muscle pain or other side effects    Taking lisinopril.  Recently had dizziness  On diet control to manage high sugar.  Review of Systems  No fever chills or night sweats  No headache or double vision  No cough chest pain or shortness of breath  No PND or orthopnea  No current nausea vomiting or bowel changes  Has joint pains  Has stress and anxiety  No suicidal thoughts  Objective   /63   Pulse 67   Temp 36.5 °C (97.7 °F)   Wt 89.8 kg (198 lb)   SpO2 95%   BMI 30.11 kg/m²     Physical Exam  In general, well-appearing, not in acute distress, alert and oriented.  HEENT: Pupils PERRLA.  No pallor or icterus  Neck: No lymphadenopathy, no stiffness.  Supple.  No enlargement of thyroid.No JVD  Chest: Clear to auscultation, good air entry.  CVS: S1 and S2 regular.  No murmur, no gallop, S3 or S4.  No peripheral edema.  No carotid bruit.  Abdomen: Soft, no tenderness, no hepatosplenomegaly.  Extremities: No calf tenderness.  No peripheral edema.  No knee or ankle effusion.  No finger synovitis.  No clubbing or cyanosis.  Neuro: No focal  deficits.  Psych: Mood and affect normal.  Good judgment and insight  Skin:No rash    Assessment/Plan   Problem List Items Addressed This Visit             ICD-10-CM    Benign essential hypertension I10    Relevant Medications    lisinopril 10 mg tablet    Other Relevant Orders    CBC and Auto Differential (Completed)    Comprehensive Metabolic Panel (Completed)    Hypercholesterolemia E78.00    Relevant Medications    rosuvastatin (Crestor) 20 mg tablet    Other Relevant Orders    TSH with reflex to Free T4 if abnormal (Completed)    Lipid Panel (Completed)    Encounter to establish care Z76.89    Hyperglycemia R73.9    Relevant Orders    Hemoglobin A1C (Completed)    Vitamin B12 (Completed)     Other Visit Diagnoses         Codes    Encounter for screening mammogram for breast cancer    -  Primary Z12.31    Relevant Orders    BI mammo bilateral screening tomosynthesis        Start monitoring blood pressure at home.  Increase fluid intake  Check renal function and electrolytes  Continue lisinopril for now  Continue statin  Check lipids and liver enzymes  Will check thyroid function since she had some symptoms of dizziness recently  Referral to counseling.  She will establish with counseling or near home  Check A1c and B12  Healthy diet habits discussed  Continue cardiology follow-up regarding pacemaker  Doing well after GYN surgery  Follow up for wellness in 3 weeks

## 2024-03-01 ENCOUNTER — OFFICE VISIT (OUTPATIENT)
Dept: CARDIOLOGY | Facility: CLINIC | Age: 74
End: 2024-03-01
Payer: MEDICARE

## 2024-03-01 VITALS
WEIGHT: 202 LBS | HEIGHT: 69 IN | OXYGEN SATURATION: 99 % | HEART RATE: 74 BPM | DIASTOLIC BLOOD PRESSURE: 72 MMHG | SYSTOLIC BLOOD PRESSURE: 132 MMHG | BODY MASS INDEX: 29.92 KG/M2

## 2024-03-01 DIAGNOSIS — E78.00 HYPERCHOLESTEROLEMIA: Chronic | ICD-10-CM

## 2024-03-01 DIAGNOSIS — I25.10 CORONARY ARTERY DISEASE INVOLVING NATIVE CORONARY ARTERY OF NATIVE HEART WITHOUT ANGINA PECTORIS: Chronic | ICD-10-CM

## 2024-03-01 DIAGNOSIS — I49.5 SICK SINUS SYNDROME DUE TO SA NODE DYSFUNCTION (MULTI): Primary | Chronic | ICD-10-CM

## 2024-03-01 DIAGNOSIS — I10 PRIMARY HYPERTENSION: Chronic | ICD-10-CM

## 2024-03-01 PROBLEM — K20.80 ESOPHAGITIS, LOS ANGELES GRADE B: Status: RESOLVED | Noted: 2023-02-07 | Resolved: 2024-03-01

## 2024-03-01 PROBLEM — R90.89 MRI OF BRAIN ABNORMAL: Status: RESOLVED | Noted: 2023-02-07 | Resolved: 2024-03-01

## 2024-03-01 PROBLEM — F33.41 RECURRENT MAJOR DEPRESSIVE DISORDER, IN PARTIAL REMISSION (CMS-HCC): Status: RESOLVED | Noted: 2023-02-07 | Resolved: 2024-03-01

## 2024-03-01 PROBLEM — K51.40: Status: RESOLVED | Noted: 2023-02-07 | Resolved: 2024-03-01

## 2024-03-01 PROBLEM — E55.9 VITAMIN D DEFICIENCY: Status: RESOLVED | Noted: 2023-02-07 | Resolved: 2024-03-01

## 2024-03-01 PROBLEM — R42 LIGHTHEADEDNESS: Status: RESOLVED | Noted: 2023-02-07 | Resolved: 2024-03-01

## 2024-03-01 PROBLEM — K76.89 HEPATIC CYST: Status: RESOLVED | Noted: 2023-02-07 | Resolved: 2024-03-01

## 2024-03-01 PROBLEM — R55 ATYPICAL SYNCOPE: Status: RESOLVED | Noted: 2023-02-07 | Resolved: 2024-03-01

## 2024-03-01 PROBLEM — R31.29 MICROHEMATURIA: Status: RESOLVED | Noted: 2023-02-07 | Resolved: 2024-03-01

## 2024-03-01 PROBLEM — G93.9 LESION OF PONS: Status: RESOLVED | Noted: 2023-02-07 | Resolved: 2024-03-01

## 2024-03-01 PROBLEM — R40.0 SOMNOLENCE, DAYTIME: Status: RESOLVED | Noted: 2023-02-07 | Resolved: 2024-03-01

## 2024-03-01 PROBLEM — G62.9 NEUROPATHY: Status: RESOLVED | Noted: 2023-02-07 | Resolved: 2024-03-01

## 2024-03-01 PROBLEM — J00 NASOPHARYNGITIS ACUTE: Status: RESOLVED | Noted: 2023-02-07 | Resolved: 2024-03-01

## 2024-03-01 PROBLEM — M79.672 FOOT PAIN, BILATERAL: Status: RESOLVED | Noted: 2023-02-07 | Resolved: 2024-03-01

## 2024-03-01 PROBLEM — F41.8 ANXIETY ASSOCIATED WITH DEPRESSION: Status: RESOLVED | Noted: 2023-02-07 | Resolved: 2024-03-01

## 2024-03-01 PROBLEM — U07.1 COVID-19 VIRUS INFECTION: Status: RESOLVED | Noted: 2023-02-07 | Resolved: 2024-03-01

## 2024-03-01 PROBLEM — R53.83 FATIGUE: Status: RESOLVED | Noted: 2023-02-07 | Resolved: 2024-03-01

## 2024-03-01 PROBLEM — K76.0 FATTY LIVER: Status: RESOLVED | Noted: 2023-02-07 | Resolved: 2024-03-01

## 2024-03-01 PROBLEM — Z98.890 POSTOPERATIVE HYPOXIA: Status: RESOLVED | Noted: 2018-05-11 | Resolved: 2024-03-01

## 2024-03-01 PROBLEM — G47.33 OSA ON CPAP: Chronic | Status: ACTIVE | Noted: 2023-02-07

## 2024-03-01 PROBLEM — Z76.89 ENCOUNTER TO ESTABLISH CARE: Status: RESOLVED | Noted: 2023-02-07 | Resolved: 2024-03-01

## 2024-03-01 PROBLEM — E83.52 HYPERCALCEMIA: Status: RESOLVED | Noted: 2023-02-07 | Resolved: 2024-03-01

## 2024-03-01 PROBLEM — E87.0 HYPERNATREMIA: Status: RESOLVED | Noted: 2023-02-07 | Resolved: 2024-03-01

## 2024-03-01 PROBLEM — E66.9 CLASS 1 OBESITY WITH BODY MASS INDEX (BMI) OF 30.0 TO 30.9 IN ADULT: Status: RESOLVED | Noted: 2023-02-07 | Resolved: 2024-03-01

## 2024-03-01 PROBLEM — D32.9 MENINGIOMA (MULTI): Chronic | Status: ACTIVE | Noted: 2023-02-07

## 2024-03-01 PROBLEM — H60.91 OTITIS EXTERNA OF RIGHT EAR: Status: RESOLVED | Noted: 2023-02-07 | Resolved: 2024-03-01

## 2024-03-01 PROBLEM — R09.02 POSTOPERATIVE HYPOXIA: Status: RESOLVED | Noted: 2018-05-11 | Resolved: 2024-03-01

## 2024-03-01 PROBLEM — R79.89 ELEVATED SERUM CREATININE: Status: RESOLVED | Noted: 2023-02-07 | Resolved: 2024-03-01

## 2024-03-01 PROBLEM — G51.0 LEFT-SIDED BELL'S PALSY: Status: RESOLVED | Noted: 2023-02-07 | Resolved: 2024-03-01

## 2024-03-01 PROBLEM — K22.89 ESOPHAGEAL THICKENING: Status: RESOLVED | Noted: 2023-02-07 | Resolved: 2024-03-01

## 2024-03-01 PROBLEM — M20.41 HAMMER TOE OF RIGHT FOOT: Status: RESOLVED | Noted: 2023-02-07 | Resolved: 2024-03-01

## 2024-03-01 PROBLEM — Z90.710 S/P HYSTERECTOMY: Status: RESOLVED | Noted: 2023-11-21 | Resolved: 2024-03-01

## 2024-03-01 PROBLEM — B37.0 ORAL THRUSH: Status: RESOLVED | Noted: 2023-02-07 | Resolved: 2024-03-01

## 2024-03-01 PROBLEM — R06.83 SNORING: Status: RESOLVED | Noted: 2023-02-07 | Resolved: 2024-03-01

## 2024-03-01 PROBLEM — Z98.890 STATUS POST SURGERY: Status: RESOLVED | Noted: 2023-11-20 | Resolved: 2024-03-01

## 2024-03-01 PROBLEM — M79.671 PAIN OF RIGHT HEEL: Status: RESOLVED | Noted: 2023-02-07 | Resolved: 2024-03-01

## 2024-03-01 PROBLEM — M19.012 DEGENERATIVE ARTHRITIS OF LEFT SHOULDER REGION: Status: RESOLVED | Noted: 2023-02-07 | Resolved: 2024-03-01

## 2024-03-01 PROBLEM — R73.9 HYPERGLYCEMIA: Status: RESOLVED | Noted: 2024-02-19 | Resolved: 2024-03-01

## 2024-03-01 PROBLEM — M65.4 DE QUERVAIN'S DISEASE (RADIAL STYLOID TENOSYNOVITIS): Status: RESOLVED | Noted: 2023-02-07 | Resolved: 2024-03-01

## 2024-03-01 PROBLEM — R74.01 ELEVATED ALT MEASUREMENT: Status: RESOLVED | Noted: 2023-02-07 | Resolved: 2024-03-01

## 2024-03-01 PROBLEM — G25.81 RESTLESS LEGS SYNDROME (RLS): Status: RESOLVED | Noted: 2023-02-07 | Resolved: 2024-03-01

## 2024-03-01 PROBLEM — R05.9 COUGH: Status: RESOLVED | Noted: 2023-02-07 | Resolved: 2024-03-01

## 2024-03-01 PROBLEM — F41.1 GENERALIZED ANXIETY DISORDER: Status: RESOLVED | Noted: 2023-02-07 | Resolved: 2024-03-01

## 2024-03-01 PROBLEM — K44.9 HIATAL HERNIA: Status: RESOLVED | Noted: 2023-02-07 | Resolved: 2024-03-01

## 2024-03-01 PROBLEM — R93.89 ABNORMAL MRI: Status: RESOLVED | Noted: 2023-02-07 | Resolved: 2024-03-01

## 2024-03-01 PROBLEM — M21.6X9 EQUINUS DEFORMITY OF FOOT: Status: RESOLVED | Noted: 2023-02-07 | Resolved: 2024-03-01

## 2024-03-01 PROBLEM — L03.011 PARONYCHIA OF FINGER OF RIGHT HAND: Status: RESOLVED | Noted: 2023-02-07 | Resolved: 2024-03-01

## 2024-03-01 PROBLEM — R19.00 PELVIC MASS IN FEMALE: Status: RESOLVED | Noted: 2023-02-07 | Resolved: 2024-03-01

## 2024-03-01 PROBLEM — R53.81 MALAISE AND FATIGUE: Status: RESOLVED | Noted: 2023-02-07 | Resolved: 2024-03-01

## 2024-03-01 PROBLEM — K31.89 EROSIVE GASTROPATHY: Status: RESOLVED | Noted: 2023-02-07 | Resolved: 2024-03-01

## 2024-03-01 PROBLEM — H60.543 DERMATITIS OF BOTH EAR CANALS: Status: RESOLVED | Noted: 2023-02-07 | Resolved: 2024-03-01

## 2024-03-01 PROBLEM — M79.671 FOOT PAIN, BILATERAL: Status: RESOLVED | Noted: 2023-02-07 | Resolved: 2024-03-01

## 2024-03-01 PROBLEM — R74.01 ELEVATED AST (SGOT): Status: RESOLVED | Noted: 2023-02-07 | Resolved: 2024-03-01

## 2024-03-01 PROBLEM — E66.811 CLASS 1 OBESITY WITH BODY MASS INDEX (BMI) OF 30.0 TO 30.9 IN ADULT: Status: RESOLVED | Noted: 2023-02-07 | Resolved: 2024-03-01

## 2024-03-01 PROBLEM — R10.2 PELVIC PAIN IN FEMALE: Status: RESOLVED | Noted: 2023-02-07 | Resolved: 2024-03-01

## 2024-03-01 PROBLEM — K63.5 POLYP OF COLON: Status: RESOLVED | Noted: 2023-02-07 | Resolved: 2024-03-01

## 2024-03-01 PROBLEM — K21.9 GERD (GASTROESOPHAGEAL REFLUX DISEASE): Status: RESOLVED | Noted: 2023-02-07 | Resolved: 2024-03-01

## 2024-03-01 PROBLEM — F43.21 GRIEF: Status: RESOLVED | Noted: 2023-02-07 | Resolved: 2024-03-01

## 2024-03-01 PROBLEM — R53.83 MALAISE AND FATIGUE: Status: RESOLVED | Noted: 2023-02-07 | Resolved: 2024-03-01

## 2024-03-01 PROCEDURE — 3078F DIAST BP <80 MM HG: CPT | Performed by: INTERNAL MEDICINE

## 2024-03-01 PROCEDURE — 1036F TOBACCO NON-USER: CPT | Performed by: INTERNAL MEDICINE

## 2024-03-01 PROCEDURE — 3075F SYST BP GE 130 - 139MM HG: CPT | Performed by: INTERNAL MEDICINE

## 2024-03-01 PROCEDURE — 1159F MED LIST DOCD IN RCRD: CPT | Performed by: INTERNAL MEDICINE

## 2024-03-01 PROCEDURE — 1126F AMNT PAIN NOTED NONE PRSNT: CPT | Performed by: INTERNAL MEDICINE

## 2024-03-01 PROCEDURE — 1160F RVW MEDS BY RX/DR IN RCRD: CPT | Performed by: INTERNAL MEDICINE

## 2024-03-01 PROCEDURE — 99214 OFFICE O/P EST MOD 30 MIN: CPT | Performed by: INTERNAL MEDICINE

## 2024-03-01 ASSESSMENT — PAIN SCALES - GENERAL: PAINLEVEL: 0-NO PAIN

## 2024-03-01 NOTE — PATIENT INSTRUCTIONS
1. Sinus bradycardia.  Status post [employer] pacemaker insertion June 2023.  The generator had to be changed as it was nonfunctional in August 2023.  She has been okay since then.      2. CAD. BMS RCA 2003. Stress test 2021 reduced sensitivity because of chronotropic incompetence. Continue aspirin continue Zetia lisinopril and rosuvastatin she does have intrascapular pain.  Her last stress test he was only able to get to 59% of her maximal predicted heart rate.  This is in 2021.  I have asked for her to repeat the stress test.  My hope is now she can at least to 85% of her maximal predicted heart rate.  This is resolved with a pacemaker     3. Dizziness resolved this was due to orthostasis. Since discontinuing her lisinopril and atenolol she has been better.     4. Hypertension excellent control. Despite off of medication.     5. Hyperlipidemia.  2/19/2024 LDL 81 HDL 38 triglycerides 179     Exercise nuclear stress test sometime in the near future.  She will call us 1 week after to review the results.  My plan is to see her back in 6 months to a year sooner if any issues arise.

## 2024-03-01 NOTE — PROGRESS NOTES
Referred by No ref. provider found    HPI I am seeing her back for the first time since May.  We had a pacemaker inserted.  The pacemaker had to be removed and the generator was changed in August.  She has noticed improvement.  She still has episodes where suddenly she has diarrhea.  She gets diaphoretic lightheaded and nauseated.  I do believe these are vagal mediated episodes and not related to bradycardia arrhythmias.  At times she is having intrascapular discomfort.    Past Medical History:  Problem List Items Addressed This Visit    None       Past Medical History:   Diagnosis Date    Anxiety     Aortic stenosis 02/07/2023    mild    Arrhythmia     Sick sinus syndrome due to SA node dysfunction    CAD (coronary artery disease) 02/07/2023    - s/p PTCA/stent 2003 with Dr. Calhoun/ [place]; recent CT cardiac scoring 2-5-18 : coronary calcium score = 906.7      Dr. Chavarria notation 5/3/2023: BMS to RCA 2003       Coronary artery disease     cards: Nancy Chavarria,Aortic stenosis    Depression     Deviated nasal septum     Deviated septum    Dry mouth, unspecified     Dry mouth    Dysfunctional uterine bleeding     Elevated C-reactive protein (CRP)     CRP elevated    Fatty (change of) liver, not elsewhere classified     Fatty liver    Fibroid     Ob/Gyn; Dina Grimes    GERD (gastroesophageal reflux disease)     Heart disease     Hemorrhage of anus and rectum     Rectal bleed    Hyperlipidemia     Hypertension     Incisional hernia     Localized edema     Bilateral edema of lower extremity    Low back pain, unspecified 10/23/2016    Acute low back pain    Other abnormal glucose     Elevated glucose    Other conditions influencing health status 07/12/2019    History of cough    Other specified abnormal immunological findings in serum     Positive KURT (antinuclear antibody)    Personal history of malignant neoplasm of breast 06/17/2022    History of malignant neoplasm of breast    Personal history of other benign neoplasm      History of uterine leiomyoma    Personal history of other diseases of the musculoskeletal system and connective tissue     History of necrotizing fasciitis    Personal history of other diseases of the musculoskeletal system and connective tissue     History of temporomandibular joint disorder    Personal history of other diseases of the nervous system and sense organs     History of carpal tunnel syndrome    Restless leg syndrome     Sleep apnea     Uses CPAP    Syncope     hx of syncopal episodes and near syncopal episodes.             Past Surgical History:  She has a past surgical history that includes Dilation and curettage of uterus (02/25/2014); Other surgical history (02/25/2014); Other surgical history (02/25/2014); Breast lumpectomy (02/25/2014); Other surgical history (02/25/2014); MR angio head wo IV contrast (06/29/2020); Other surgical history (06/04/2018); Breast surgery (09/10/2018); Insert / replace / remove pacemaker (06/09/2023); Colonoscopy; Endoscopic insertion peritoneal catheter port; and Cholecystectomy.      Social History:  She reports that she has never smoked. She has never used smokeless tobacco. She reports current alcohol use. She reports that she does not use drugs.    Family History:  Family History   Problem Relation Name Age of Onset    Hypertension Mother      Diabetes type II Mother          Allergies:  Sulfa (sulfonamide antibiotics) and Latex    Outpatient Medications:  Current Outpatient Medications   Medication Instructions    acetaminophen (TYLENOL) 650 mg, oral, Every 6 hours PRN    aspirin 81 mg, oral, Daily RT    cholecalciferol (Vitamin D-3) 50 mcg (2,000 unit) capsule 1 capsule, oral, Daily    cyanocobalamin (Vitamin B-12) 1,000 mcg tablet 1 tablet, oral, Daily    escitalopram (LEXAPRO) 10 mg, oral, Daily    lisinopril 10 mg, oral, Daily RT    pantoprazole (PROTONIX) 40 mg, oral, Daily    rosuvastatin (CRESTOR) 20 mg, oral, Daily RT        Last Recorded Vitals:  There  were no vitals filed for this visit.    Physical Exam    Physical  Patient is alert and oriented x3.  HEENT is unremarkable mucous members are moist  Neck no JVP no bruits upstrokes are full no thyromegaly  Lungs are clear bilaterally.  No wheezing crackles or rales  Heart regular rhythm normal S1-S2 there is no S3 no murmurs are heard.  Abdomen is soft vessels are positive nontender nondistended no organomegaly no pulsatile masses  Extremities have no edema.  Distal pulses present palpable.  Neuro is grossly nonfocal  Skin has no rashes     Last Labs:  CBC -  Lab Results   Component Value Date    WBC 7.5 02/19/2024    HGB 15.0 02/19/2024    HCT 47.0 (H) 02/19/2024     02/19/2024     02/19/2024       CMP -  Lab Results   Component Value Date    CALCIUM 10.3 02/19/2024    PROT 7.4 02/19/2024    ALBUMIN 4.6 02/19/2024    AST 27 02/19/2024    ALT 30 02/19/2024    ALKPHOS 66 02/19/2024    BILITOT 0.9 02/19/2024       LIPID PANEL -   Lab Results   Component Value Date    CHOL 155 02/19/2024    HDL 38.0 02/19/2024    CHHDL 4.1 02/19/2024    VLDL 36 02/19/2024    TRIG 179 (H) 02/19/2024    NHDL 117 02/19/2024       RENAL FUNCTION PANEL -   Lab Results   Component Value Date    K 4.1 02/19/2024       Lab Results   Component Value Date    BNP 66 10/29/2019    HGBA1C 5.9 (H) 02/19/2024     Procedure    Pacemaker generator revision 8/24/2023    Pacemaker 6/29/2023 [employer]    HOLTER [04/03/2023]: SR, -49. Sinus pauses up to 3.6 seconds in duration. No ep/of A-fib / PSVT / high-grade AV block. No VT.      HOLTER [05/16/2022]: SR, -68. No ep/of A-fib / PSVT / high-grade AV block. No VT.      ECHO [05/16/2022]: EF 55-60%. SD = impaired relaxation pattern. Mod cLVH.      HOLTER [04/07/2022]: SR, 34-79-57. Sinus pauses up to 3.5 sec present. No ep/of A-fib / PSVT / high-grade AV block. No VT.     CT [01/07/2022]: Stable 0.7 cm lingular pulm nodule and 0.4 cm RLL nodule. Stable reticulonodular  densities w/in TAMANNA anteriorly, likely relates to postop/post-radiation changes. Stable postsurgical changes of the left breast w/o ev/of local dz recurrence. Small hiatal hernia. Severe coronary artery calcifications. 2.5 cm fluid attenuating hepatic cyst.      EX NST [05/05/2021]: 3 min 45 sec (5.40 METs) . . . Normal perfusion imaging. However, diagnostic sensitivity reduced as patient is only able to achieve 59% max pred HR. Well-maint LVF, EF 53%.     CAROTID [06/10/2020]: Less than 50% stenosis MANPREET / LICA      HOME SLEEP STUDY [04/30/2020] = SEVERE sleep apnea      ECHO [2018]: EF 50-55%, mild AS     CATH [2003]: subtotal RCA s/p BMS          Assessment/Plan   1. Sinus bradycardia.  Status post [employer] pacemaker insertion June 2023.  The generator had to be changed as it was nonfunctional in August 2023.  She has been okay since then.      2. CAD. BMS RCA 2003. Stress test 2021 reduced sensitivity because of chronotropic incompetence. Continue aspirin continue Zetia lisinopril and rosuvastatin she does have intrascapular pain.  Her last stress test he was only able to get to 59% of her maximal predicted heart rate.  This is in 2021.  I have asked for her to repeat the stress test.  My hope is now she can at least to 85% of her maximal predicted heart rate.  This is resolved with a pacemaker     3. Dizziness resolved this was due to orthostasis. Since discontinuing her lisinopril and atenolol she has been better.     4. Hypertension excellent control. Despite off of medication.     5. Hyperlipidemia.  2/19/2024 LDL 81 HDL 38 triglycerides 179     Exercise nuclear stress test sometime in the near future.  She will call us 1 week after to review the results.  My plan is to see her back in 6 months to a year sooner if any issues arise.    Nancy Chavarria MD     Instructions and follow up

## 2024-03-05 ENCOUNTER — HOSPITAL ENCOUNTER (OUTPATIENT)
Dept: CARDIOLOGY | Facility: CLINIC | Age: 74
Discharge: HOME | End: 2024-03-05
Payer: MEDICARE

## 2024-03-05 DIAGNOSIS — Z95.0 CARDIAC PACEMAKER IN SITU: ICD-10-CM

## 2024-03-05 DIAGNOSIS — I49.5 SICK SINUS SYNDROME DUE TO SA NODE DYSFUNCTION (MULTI): ICD-10-CM

## 2024-03-06 ENCOUNTER — TELEPHONE (OUTPATIENT)
Dept: PRIMARY CARE | Facility: CLINIC | Age: 74
End: 2024-03-06
Payer: MEDICARE

## 2024-03-06 DIAGNOSIS — Z95.0 CARDIAC PACEMAKER IN SITU: ICD-10-CM

## 2024-03-06 DIAGNOSIS — I49.5 SICK SINUS SYNDROME DUE TO SA NODE DYSFUNCTION (MULTI): ICD-10-CM

## 2024-03-06 DIAGNOSIS — K21.00 GASTROESOPHAGEAL REFLUX DISEASE WITH ESOPHAGITIS WITHOUT HEMORRHAGE: ICD-10-CM

## 2024-03-06 DIAGNOSIS — F41.1 GENERALIZED ANXIETY DISORDER: ICD-10-CM

## 2024-03-06 RX ORDER — ESCITALOPRAM OXALATE 10 MG/1
10 TABLET ORAL DAILY
Qty: 90 TABLET | Refills: 0 | Status: SHIPPED | OUTPATIENT
Start: 2024-03-06 | End: 2024-06-05 | Stop reason: SDUPTHER

## 2024-03-06 RX ORDER — PANTOPRAZOLE SODIUM 40 MG/1
40 TABLET, DELAYED RELEASE ORAL DAILY
Qty: 90 TABLET | Refills: 0 | Status: SHIPPED | OUTPATIENT
Start: 2024-03-06 | End: 2024-06-10 | Stop reason: SDUPTHER

## 2024-03-06 NOTE — TELEPHONE ENCOUNTER
Rx Refill Request Telephone Encounter    Name:  Darline Gonzalez  :  [id number]  Medication Name:  pantoprazole EC tablet  Dose : 40 mg   Directions : take 1 tablet by mouth once daily    Medication Name:  escitalopram  Dose : 10 mg  Directions : take 1 tablet by mouth once daily  Specific Pharmacy location:  [facility]  Date of last appointment:  2023  Date of next appointment:  2024  Best number to reach patient:  [phone]      Rx sent

## 2024-03-19 ENCOUNTER — APPOINTMENT (OUTPATIENT)
Dept: PRIMARY CARE | Facility: CLINIC | Age: 74
End: 2024-03-19
Payer: MEDICARE

## 2024-03-20 ENCOUNTER — APPOINTMENT (OUTPATIENT)
Dept: PRIMARY CARE | Facility: CLINIC | Age: 74
End: 2024-03-20
Payer: MEDICARE

## 2024-04-04 ENCOUNTER — APPOINTMENT (OUTPATIENT)
Dept: PRIMARY CARE | Facility: CLINIC | Age: 74
End: 2024-04-04
Payer: MEDICARE

## 2024-04-09 ENCOUNTER — HOSPITAL ENCOUNTER (OUTPATIENT)
Dept: RADIOLOGY | Facility: CLINIC | Age: 74
Discharge: HOME | End: 2024-04-09
Payer: MEDICARE

## 2024-04-09 ENCOUNTER — TELEPHONE (OUTPATIENT)
Dept: CARDIOLOGY | Facility: CLINIC | Age: 74
End: 2024-04-09

## 2024-04-09 ENCOUNTER — HOSPITAL ENCOUNTER (OUTPATIENT)
Dept: CARDIOLOGY | Facility: CLINIC | Age: 74
Discharge: HOME | End: 2024-04-09
Payer: MEDICARE

## 2024-04-09 DIAGNOSIS — I25.10 CAD (CORONARY ARTERY DISEASE): Primary | Chronic | ICD-10-CM

## 2024-04-09 DIAGNOSIS — I25.10 CORONARY ARTERY DISEASE INVOLVING NATIVE CORONARY ARTERY OF NATIVE HEART WITHOUT ANGINA PECTORIS: Chronic | ICD-10-CM

## 2024-04-09 PROCEDURE — 3430000001 HC RX 343 DIAGNOSTIC RADIOPHARMACEUTICALS: Performed by: INTERNAL MEDICINE

## 2024-04-09 PROCEDURE — 78452 HT MUSCLE IMAGE SPECT MULT: CPT

## 2024-04-09 PROCEDURE — 93017 CV STRESS TEST TRACING ONLY: CPT

## 2024-04-09 PROCEDURE — 78452 HT MUSCLE IMAGE SPECT MULT: CPT | Performed by: INTERNAL MEDICINE

## 2024-04-09 PROCEDURE — 2500000004 HC RX 250 GENERAL PHARMACY W/ HCPCS (ALT 636 FOR OP/ED): Performed by: INTERNAL MEDICINE

## 2024-04-09 PROCEDURE — A9502 TC99M TETROFOSMIN: HCPCS | Performed by: INTERNAL MEDICINE

## 2024-04-09 RX ORDER — AMINOPHYLLINE 25 MG/ML
50 INJECTION, SOLUTION INTRAVENOUS ONCE
Status: COMPLETED | OUTPATIENT
Start: 2024-04-09 | End: 2024-04-09

## 2024-04-09 RX ORDER — REGADENOSON 0.08 MG/ML
0.4 INJECTION, SOLUTION INTRAVENOUS ONCE
Status: COMPLETED | OUTPATIENT
Start: 2024-04-09 | End: 2024-04-09

## 2024-04-09 RX ADMIN — TETROFOSMIN 34.8 MILLICURIE: 0.23 INJECTION, POWDER, LYOPHILIZED, FOR SOLUTION INTRAVENOUS at 11:03

## 2024-04-09 RX ADMIN — REGADENOSON 0.4 MG: 0.08 INJECTION, SOLUTION INTRAVENOUS at 11:04

## 2024-04-09 RX ADMIN — AMINOPHYLLINE 50 MG: 25 INJECTION, SOLUTION INTRAVENOUS at 11:22

## 2024-04-09 RX ADMIN — TETROFOSMIN 12 MILLICURIE: 0.23 INJECTION, POWDER, LYOPHILIZED, FOR SOLUTION INTRAVENOUS at 09:50

## 2024-04-25 ENCOUNTER — OFFICE VISIT (OUTPATIENT)
Dept: PRIMARY CARE | Facility: CLINIC | Age: 74
End: 2024-04-25
Payer: MEDICARE

## 2024-04-25 VITALS
DIASTOLIC BLOOD PRESSURE: 73 MMHG | BODY MASS INDEX: 29.4 KG/M2 | SYSTOLIC BLOOD PRESSURE: 111 MMHG | WEIGHT: 198.5 LBS | OXYGEN SATURATION: 95 % | TEMPERATURE: 97.2 F | HEART RATE: 60 BPM | HEIGHT: 69 IN

## 2024-04-25 DIAGNOSIS — Z78.0 ASYMPTOMATIC MENOPAUSAL STATE: ICD-10-CM

## 2024-04-25 DIAGNOSIS — C50.512 MALIGNANT NEOPLASM OF LOWER-OUTER QUADRANT OF LEFT BREAST OF FEMALE, ESTROGEN RECEPTOR POSITIVE (MULTI): ICD-10-CM

## 2024-04-25 DIAGNOSIS — Z00.00 ROUTINE GENERAL MEDICAL EXAMINATION AT HEALTH CARE FACILITY: Primary | ICD-10-CM

## 2024-04-25 DIAGNOSIS — Z17.0 MALIGNANT NEOPLASM OF LOWER-OUTER QUADRANT OF LEFT BREAST OF FEMALE, ESTROGEN RECEPTOR POSITIVE (MULTI): ICD-10-CM

## 2024-04-25 DIAGNOSIS — N18.31 STAGE 3A CHRONIC KIDNEY DISEASE (MULTI): ICD-10-CM

## 2024-04-25 DIAGNOSIS — F33.9 DEPRESSION, RECURRENT (CMS-HCC): ICD-10-CM

## 2024-04-25 DIAGNOSIS — Z23 NEED FOR VACCINATION: ICD-10-CM

## 2024-04-25 DIAGNOSIS — R42 VERTIGO: ICD-10-CM

## 2024-04-25 PROBLEM — J96.01 ACUTE RESPIRATORY FAILURE WITH HYPOXEMIA (MULTI): Status: RESOLVED | Noted: 2018-05-13 | Resolved: 2024-04-25

## 2024-04-25 PROBLEM — J81.0 ACUTE PULMONARY EDEMA (MULTI): Status: RESOLVED | Noted: 2018-05-13 | Resolved: 2024-04-25

## 2024-04-25 PROCEDURE — 1170F FXNL STATUS ASSESSED: CPT | Performed by: INTERNAL MEDICINE

## 2024-04-25 PROCEDURE — 1123F ACP DISCUSS/DSCN MKR DOCD: CPT | Performed by: INTERNAL MEDICINE

## 2024-04-25 PROCEDURE — 1159F MED LIST DOCD IN RCRD: CPT | Performed by: INTERNAL MEDICINE

## 2024-04-25 PROCEDURE — 1160F RVW MEDS BY RX/DR IN RCRD: CPT | Performed by: INTERNAL MEDICINE

## 2024-04-25 PROCEDURE — 1036F TOBACCO NON-USER: CPT | Performed by: INTERNAL MEDICINE

## 2024-04-25 PROCEDURE — 99213 OFFICE O/P EST LOW 20 MIN: CPT | Performed by: INTERNAL MEDICINE

## 2024-04-25 PROCEDURE — 3078F DIAST BP <80 MM HG: CPT | Performed by: INTERNAL MEDICINE

## 2024-04-25 PROCEDURE — G0439 PPPS, SUBSEQ VISIT: HCPCS | Performed by: INTERNAL MEDICINE

## 2024-04-25 PROCEDURE — 1158F ADVNC CARE PLAN TLK DOCD: CPT | Performed by: INTERNAL MEDICINE

## 2024-04-25 PROCEDURE — 3074F SYST BP LT 130 MM HG: CPT | Performed by: INTERNAL MEDICINE

## 2024-04-25 ASSESSMENT — ACTIVITIES OF DAILY LIVING (ADL)
DRESSING: INDEPENDENT
TAKING_MEDICATION: INDEPENDENT
BATHING: INDEPENDENT
MANAGING_FINANCES: INDEPENDENT
DOING_HOUSEWORK: INDEPENDENT
GROCERY_SHOPPING: INDEPENDENT

## 2024-04-25 ASSESSMENT — PATIENT HEALTH QUESTIONNAIRE - PHQ9
5. POOR APPETITE OR OVEREATING: SEVERAL DAYS
10. IF YOU CHECKED OFF ANY PROBLEMS, HOW DIFFICULT HAVE THESE PROBLEMS MADE IT FOR YOU TO DO YOUR WORK, TAKE CARE OF THINGS AT HOME, OR GET ALONG WITH OTHER PEOPLE: SOMEWHAT DIFFICULT
10. IF YOU CHECKED OFF ANY PROBLEMS, HOW DIFFICULT HAVE THESE PROBLEMS MADE IT FOR YOU TO DO YOUR WORK, TAKE CARE OF THINGS AT HOME, OR GET ALONG WITH OTHER PEOPLE: SOMEWHAT DIFFICULT
7. TROUBLE CONCENTRATING ON THINGS, SUCH AS READING THE NEWSPAPER OR WATCHING TELEVISION: NOT AT ALL
1. LITTLE INTEREST OR PLEASURE IN DOING THINGS: SEVERAL DAYS
6. FEELING BAD ABOUT YOURSELF - OR THAT YOU ARE A FAILURE OR HAVE LET YOURSELF OR YOUR FAMILY DOWN: NOT AT ALL
4. FEELING TIRED OR HAVING LITTLE ENERGY: MORE THAN HALF THE DAYS
1. LITTLE INTEREST OR PLEASURE IN DOING THINGS: SEVERAL DAYS
2. FEELING DOWN, DEPRESSED OR HOPELESS: SEVERAL DAYS
SUM OF ALL RESPONSES TO PHQ9 QUESTIONS 1 AND 2: 2
9. THOUGHTS THAT YOU WOULD BE BETTER OFF DEAD, OR OF HURTING YOURSELF: NOT AT ALL
SUM OF ALL RESPONSES TO PHQ9 QUESTIONS 1 AND 2: 2
2. FEELING DOWN, DEPRESSED OR HOPELESS: SEVERAL DAYS
8. MOVING OR SPEAKING SO SLOWLY THAT OTHER PEOPLE COULD HAVE NOTICED. OR THE OPPOSITE, BEING SO FIGETY OR RESTLESS THAT YOU HAVE BEEN MOVING AROUND A LOT MORE THAN USUAL: NOT AT ALL
3. TROUBLE FALLING OR STAYING ASLEEP OR SLEEPING TOO MUCH: MORE THAN HALF THE DAYS
SUM OF ALL RESPONSES TO PHQ QUESTIONS 1-9: 7

## 2024-04-25 NOTE — PROGRESS NOTES
"Subjective   Reason for Visit: Darline Gonzalez is an 74 y.o. female here for a Medicare Wellness visit.     Past Medical, Surgical, and Family History reviewed and updated in chart.    Reviewed all medications by prescribing practitioner or clinical pharmacist (such as prescriptions, OTCs, herbal therapies and supplements) and documented in the medical record.    HPI  Has sciatic pain right side.Off and on for one year  No back pain  Worse at night  Last week,while bending got pain aggravated.tylenol helps    Taking lexapro.depression is not well controlled  Needs to see counsellor  Had vertigo after a facial  Gets it frequently  Sees cardiology      Patient Care Team:  Jo Sanchez MD as PCP - General (Internal Medicine)  Geraldine Downey MD as PCP - [facility] Attributed Provider     Review of Systems  Constitutional: No recent weight loss, no fever or chills or night sweats.  No fatigue  HEENT: No blurred vision or double vision.  No hearing loss.  No sinus drainage or nosebleeds  CVS: No exertional chest pain or shortness of breath.  No palpitation or edema  Respiratory: No chronic cough or shortness of breath or wheezing  GI: No nausea vomiting or heartburn diarrhea or constipation.  No rectal bleed or bowel changes  Genitourinary: No urinary frequency or hesitancy.  Has  nocturia o.noblood in urine  Neuro: No weakness numbness or tingling.  No memory issues.    Objective   Vitals:  /73   Pulse 60   Temp 36.2 °C (97.2 °F)   Ht 1.74 m (5' 8.5\")   Wt 90 kg (198 lb 8 oz)   SpO2 95%   BMI 29.74 kg/m²       Physical Exam  In general, well-appearing, not in acute distress, alert and oriented.  HEENT: Pupils PERRLA.  No pallor or icterus  Neck: No lymphadenopathy, no stiffness.  Supple.  No enlargement of thyroid.No JVD  Chest: Clear to auscultation, good air entry.  CVS: S1 and S2 regular.  No murmur, no gallop, S3 or S4.  No peripheral edema.  No carotid bruit.  Abdomen: Soft, no tenderness, no " hepatosplenomegaly.  Extremities: No calf tenderness.  No peripheral edema.  No knee or ankle effusion.  No finger synovitis.  No clubbing or cyanosis.  Neuro: No focal deficits.  Psych: Mood and affect normal.  Good judgment and insight  Skin:No rash    SLR neg b/l  Feet exam:Nor mal.war.no callus or ulcers.monofilament exam normal  Assessment/Plan   Problem List Items Addressed This Visit       Vertigo    Relevant Orders    Referral to Physical Therapy    Breast cancer (Multi)    Depression, recurrent (CMS-HCC)    Stage 3a chronic kidney disease (Multi)     Other Visit Diagnoses       Routine general medical examination at health care facility    -  Primary    Need for vaccination        Relevant Medications    zoster vaccine-recombinant adjuvanted (Shingrix) 50 mcg/0.5 mL vaccine    Asymptomatic menopausal state        Relevant Orders    XR DEXA bone density          Wellness exam and counseling completed.  Son is medical power of   Depression fairly controlled.  She will see counselor.  No change in medications  Call 911 for suicidal thoughts  Okay to use BP monitoring  DEXA scan and mammogram ordered  Shingrix recommended  Kidney function stable  She has vertigo.  Will refer to vestibular therapy  Has right lower leg pain.  Seems more like pyriformis syndrome.  Home exercises explained to patient.  Can go to physical therapy if needed  Blood pressure is controlled.  Continue statin  Eye exam will be scheduled soon  Feet exam is normal  Follow-up in 6 months and as needed

## 2024-05-02 ENCOUNTER — APPOINTMENT (OUTPATIENT)
Dept: RADIOLOGY | Facility: CLINIC | Age: 74
End: 2024-05-02
Payer: MEDICARE

## 2024-05-06 ENCOUNTER — HOSPITAL ENCOUNTER (OUTPATIENT)
Dept: RADIOLOGY | Facility: CLINIC | Age: 74
Discharge: HOME | End: 2024-05-06
Payer: MEDICARE

## 2024-05-06 VITALS — BODY MASS INDEX: 30.01 KG/M2 | WEIGHT: 198 LBS | HEIGHT: 68 IN

## 2024-05-06 DIAGNOSIS — Z78.0 ASYMPTOMATIC MENOPAUSAL STATE: ICD-10-CM

## 2024-05-06 DIAGNOSIS — Z12.31 ENCOUNTER FOR SCREENING MAMMOGRAM FOR BREAST CANCER: ICD-10-CM

## 2024-05-06 PROCEDURE — 77067 SCR MAMMO BI INCL CAD: CPT | Performed by: RADIOLOGY

## 2024-05-06 PROCEDURE — 77067 SCR MAMMO BI INCL CAD: CPT

## 2024-05-06 PROCEDURE — 77080 DXA BONE DENSITY AXIAL: CPT

## 2024-05-06 PROCEDURE — 77063 BREAST TOMOSYNTHESIS BI: CPT | Performed by: RADIOLOGY

## 2024-05-06 NOTE — RESULT ENCOUNTER NOTE
Bone  density test is okay.  Spine bone density is normal.  Hip is slightly lower than normal range.  No medications needed.  Continue calcium and D and next steps

## 2024-05-07 DIAGNOSIS — M54.31 SCIATICA OF RIGHT SIDE: Primary | ICD-10-CM

## 2024-05-29 NOTE — PROGRESS NOTES
PHYSICAL THERAPY EVALUATION AND TREATMENT    Patient Name: Darline Gonzalez  MRN: [id number]  Today's Date: 5/30/2024  Time Calculation  Start Time: 0947  Stop Time: 1044  Time Calculation (min): 57 min    Insurance:  Visit number: 1  Insurance Type: Payor: MEDICARE / Plan: MEDICARE PART A AND B / Product Type: *No Product type* /   Certification Period Start Date: 05/30/24  Certification Period End Date: 08/28/24  Referred by: Jo Sanchez MD     PT Evaluation Time Entry  PT Evaluation (Low) Time Entry: 47  PT Therapeutic Procedures Time Entry  Therapeutic Activity Time Entry: 10                     Assessment:     Darline Gonzalez  is a 74 y.o. old patient who participated in a physical therapy evaluation today due to dizziness which has been present for 3 years. Positional Testing positive for L posterior cupulolithiasis today. Pt very symptomatic after Serge Lomas Denali and when attempted to complete L Liberatory maneuver today. Per her request, held CRP attempts today. She asked to sit in lobby post tx to allow symptoms to decrease. her impairments include: balance deficits and dizziness.  Due to these impairments, she has the following functional limitations and participation restrictions: increased fall risk, difficulty with sleeping, stair negotiation, transfers, performing household/recreational activities, and performing some ADLS.  Skilled physical therapy services are appropriate and beneficial in order to achieve measurable and meaningful change in the above objective tests and measures. Utilization of skilled physical therapy services will aid in advancing her functional status and attaining her therapy-related goals. The patient verbalized understanding and is in agreement with all goals and plan of care.  Plan of care was developed with input and agreement by the patient  Rehab Potential: Good    Plan: Treatment/Interventions: Canalith repositioning, Cryotherapy, Education/ Instruction, Gait training,  "Manual therapy, Neuromuscular re-education, Therapeutic exercises, Therapeutic activities  PT Plan: Skilled PT  PT Frequency:  (1-2x per week)  Duration: for 10-12 weeks  Certification Period Start Date: 05/30/24  Certification Period End Date: 08/28/24  NV: complete CRP as able/tolerated       Therapy Diagnosis:   Problem List Items Addressed This Visit             ICD-10-CM       ENT    Vertigo R42    Relevant Orders    Follow Up In Physical Therapy       Subjective    Darline Gonzalez  is a 74 y.o. female  presenting to the clinic with chief complaint of dizziness.  She is unable to lay flat. Getting up too fast triggers her symptoms.  Reports she is now having some balance trouble and is falling backwards now. Has had 3 falls recently.  Having some imbalance with walking in darker environments    Darline Gonzalez  reports symptoms began about 3 years, - feels it is her L ear.     Darline Gonzalez  denies numbness, tingling, diplopia, drop attacks, dysarthria.     Pt goals for therapy: help the dizziness    PLOF: IND   CLOF: IND   Functional limitations: see above    Work: retired    Medical History Form: Reviewed (scanned into chart)      Precautions:  Fall Risk: Moderate   \"Pronounces name Sure-ree\"  **+osteopenia in L femoral neck, lumbar spine normal on recent DEXA scan**   **+pacemaker**  **+latex allergy**  Denies: DM, blood thinner medication use, epilepsy/seizures, or other known neuro/pulmonary problems   +pacemaker  H/o breast CA - in remission since 2012; breast lumpectomy in 2011 plus s/p chemo and radiation  +HTN  H/o vertigo in 1980's   +neuropathy in B feet after chemo and radiation  H/o HA's and migraines   Previous surgeries include: none orthopedically related     Objective   Vestibular:   Oculomotor Exam:   Convergence = R eye deviated outward  Extraocular ROM = WNL  Smooth pursuits = mild saccadic intrusion present with horizontal; Vertical WNL  Horizontal saccades = WNL  Vertical saccades = " WNL  Spontaneous Nystagmus = negative with goggles  Gaze Evoked Nystagmus = negative with goggles    Positional Testing (with goggles):   Serge-Hallpike R/L: symptomatic positive / positive with rotational upbeating nystagmus lasting > 60 seconds   Horizontal Roll Test R/L: not tested     Functional Mobility Assessment:   Gait: IND   Transfers: IND but slower performance present       Outcome Measures:  Pt to bring back DHI nv     Treatments:  Therapeutic Activity    Educated pt regarding benefit and purpose of skilled PT services along with results of examination findings and how this correlates to their chief complaint.  -Explained relevant anatomy related to vestibular system and pt's chief complaint  -Discussed pathophysiology of BPPV (canalithiasis versus cupulolithiasis) versus vestibular hypofunction   -Risk factors associated with increased prevalence of BPPV  -Answered pt's questions in full    Attempted to complete L Liberatory maneuver but pt unable to tolerate today       EDUCATION:  See above     Goals:  Darline Gonzalez will test negative for positional vertigo.    Darline Gonzalez will report no complaint of positional imbalance or vertigo for one week with daily activities.    Darline Gonzalez will decrease DHI by >/= 18 points (minimally clinically important difference) or </=34 points (16-34 Points is a mild handicap) in order to improve safety at home and decrease falls risk. Baseline    Darline Gonzalez will complete sit <> stand transfers using BUE, equal weight bearing on LEs, and no major LOB at completion of transfer during 3/3 trials in order to enhance functional mobility and safety.    Darline Gonzalez will ambulate with IND on even surfaces for community distances without imbalance or major deviation to safely return to [facility] and enhance access to the community.    Darline Gonzalez will demonstrate independence and report compliance with HEP to facilitate independent rehab program upon  discharge.

## 2024-05-30 ENCOUNTER — CLINICAL SUPPORT (OUTPATIENT)
Dept: PHYSICAL THERAPY | Facility: CLINIC | Age: 74
End: 2024-05-30
Payer: MEDICARE

## 2024-05-30 DIAGNOSIS — R42 VERTIGO: ICD-10-CM

## 2024-05-30 PROCEDURE — 97530 THERAPEUTIC ACTIVITIES: CPT | Mod: GP

## 2024-05-30 PROCEDURE — 97161 PT EVAL LOW COMPLEX 20 MIN: CPT | Mod: GP

## 2024-05-30 ASSESSMENT — PATIENT HEALTH QUESTIONNAIRE - PHQ9
1. LITTLE INTEREST OR PLEASURE IN DOING THINGS: NOT AT ALL
SUM OF ALL RESPONSES TO PHQ9 QUESTIONS 1 AND 2: 0
2. FEELING DOWN, DEPRESSED OR HOPELESS: NOT AT ALL

## 2024-06-04 ENCOUNTER — TREATMENT (OUTPATIENT)
Dept: PHYSICAL THERAPY | Facility: CLINIC | Age: 74
End: 2024-06-04
Payer: MEDICARE

## 2024-06-04 DIAGNOSIS — R42 VERTIGO: Primary | ICD-10-CM

## 2024-06-04 DIAGNOSIS — M54.50 CHRONIC LOW BACK PAIN, UNSPECIFIED BACK PAIN LATERALITY, UNSPECIFIED WHETHER SCIATICA PRESENT: ICD-10-CM

## 2024-06-04 DIAGNOSIS — M54.31 SCIATICA OF RIGHT SIDE: ICD-10-CM

## 2024-06-04 DIAGNOSIS — G89.29 CHRONIC LOW BACK PAIN, UNSPECIFIED BACK PAIN LATERALITY, UNSPECIFIED WHETHER SCIATICA PRESENT: ICD-10-CM

## 2024-06-04 PROCEDURE — 97530 THERAPEUTIC ACTIVITIES: CPT | Mod: GP

## 2024-06-04 PROCEDURE — 97110 THERAPEUTIC EXERCISES: CPT | Mod: GP

## 2024-06-04 NOTE — PROGRESS NOTES
PHYSICAL THERAPY TREATMENT    Patient Name: Darline Gonzalez  MRN: [id number]  Today's Date: 6/4/2024  Time Calculation  Start Time: 0847  Stop Time: 0927  Time Calculation (min): 40 min    Insurance:  Visit number: 2  Insurance Type: Payor: MEDICARE / Plan: MEDICARE PART A AND B / Product Type: *No Product type* /   Certification Period Start Date: 05/30/24 for BPPV   Certification Period End Date: 08/28/24 for BPPV  Certification Period Start Date: 06/04/24 for LBP  Certification Period End Date: 09/02/24 for LBP   Referred by: Jo Sanchez MD    ** LBP added to PT POC on 6/4/2025**     PT Therapeutic Procedures Time Entry  Therapeutic Exercise Time Entry: 10  Therapeutic Activity Time Entry: 30                     Assessment:   Progress towards goals: Added lumbar spine to PT POC today. Pt appears extension bias today based upon lumbar repeated motion screening. HEP updated with extension based interventions and core/prox hip strengthening. She was receptive to all provided education today.   Pt response to tx: Dec pain levels  Justification for skilled care:  to decrease pain levels related to LBP and to decrease dizziness to restore PLOF      Plan updated related to LBP: Treatment/Interventions: Aquatic therapy, Canalith repositioning, Cryotherapy, Dry needling, Education/ Instruction, Gait training, Manual therapy, Neuromuscular re-education, Therapeutic activities, Therapeutic exercises  PT Plan: Skilled PT  PT Frequency:  (1-2x per week)  Duration: for 10-12 weeks  Certification Period Start Date: 06/04/24  Certification Period End Date: 09/02/24  NV: complete CRP as able/tolerated; assess response to prone lying/standing extension; core/prox hip strengthening; pelvic assessment if needed      Therapy Diagnosis:   Problem List Items Addressed This Visit             ICD-10-CM       ENT    Vertigo - Primary R42    Relevant Orders    Follow Up In Physical Therapy       Musculoskeletal and Injuries    Chronic  "low back pain M54.50, G89.29    Relevant Orders    Follow Up In Physical Therapy     Other Visit Diagnoses         Codes    Sciatica of right side     M54.31    Relevant Orders    Follow Up In Physical Therapy            Subjective    Darline Gonzalez  is a 74 y.o. female  presenting to the clinic with LBP. Pain located in R gluteal region.  Years ago she would get sciatic nerve issues which would come and go.     Darline Gonzalez  reports symptoms began about 2-3 months ago. Reports pain is worse with laying in bed. Starts in R gluteal region then travels down front of R thigh to R ankle sometimes.   Walking around and moving seems to help the pain.   Reports no issues with sitting or walking or standing,  Reports laying on R side in bed seems to help the pain. Denies any INC pain when rolling over in bed.  Reports R buttocks pain currently rated 3/10 and R LE symptoms to just past the knee at start of session.      Darline Gonzalez  denies any bowel or bladder changes except for some mild incontinence with walking to bathroom at night.       PLOF: IND   CLOF: IND   Functional limitations: see above      Precautions:  Fall Risk: Moderate   \"Pronounces name Sure-ree\"  **+osteopenia in L femoral neck, lumbar spine normal on recent DEXA scan**   **+pacemaker**  **+latex allergy**  Denies: DM, blood thinner medication use, epilepsy/seizures, or other known neuro/pulmonary problems   +pacemaker  H/o breast CA - in remission since 2012; breast lumpectomy in 2011 plus s/p chemo and radiation  +HTN  H/o vertigo in 1980's   +neuropathy in B feet after chemo and radiation  H/o HA's and migraines   Previous surgeries include: none orthopedically related     Objective       Treatments:  Therapeutic Activity    Assessment of pt's LBP completed today:   Myotomes/Strength (MMT in sitting):  Hip Flex (L2) R/L: 4+/4+  Hip Add R/L: 4/4  Hip Abd R/L: 4/4  Knee Ext (L3) R/L: 4+* R groin pain / 4  Knee Flex R/L: 4+/4  Ankle DF (L4) " R/L:  4+/4+   Abdominals (sitting unsupported): 3+  Back Extensors (sitting unsupported):  4    Range of Motion/Repeated Movements:  **R buttocks pain currently rated 3/10 and no R LE symptoms at start of movement screen**  Standing Lumbar Flexion: to mid shins, no pain  Repeated Loaded Flexion: No change in pain  Standing Lumbar Ext: min limited   Repeated Loaded Ext: DEC R buttocks pain   Side Bend R/L: min limited B with R SB worse than L side   Prone Lie: abolished R buttocks pain and No R LE symptoms present    Special Tests:  Slump R: negative    Palpation: INC soreness upon palpation to medial R sacral border and R lumbar paraspinals    Other Measures  Dizziness Handicap Inventory: 22  Oswestry Disablity Index (POOJA): 5      Therapeutic Exercise  Access Code: [email or web address]  - Lying Prone  - 2-5 x daily - 7 x weekly - 2-5 minutes hold  - Standing Lumbar Extension  - 2-5 x daily - 7 x weekly - 1 sets - 10 reps - 1-2 seconds hold  - Seated Transversus Abdominis Bracing  - 1 x daily - 7 x weekly - 2-3 sets - 10 reps - 1-2 seconds hold  - Seated Hip Abduction with RTB Resistance  - 1 x daily - 7 x weekly - 2 sets - 12 reps - 1-2 seconds hold      EDUCATION:  Educated pt regarding benefit and purpose of skilled PT services along with results of examination findings and how this correlates to their chief complaint.     Added Lumbar Spine Goals:  Darline Gonzalez will report driving with 75% less pain to allow for return to work and ADLs without limitation.     Darline Gonzalez will report 75% improvement in sleep status in order to attain adequate rest.    Darline Kasperll improve Oswestry (POOJA) score by at least 5 points  in order to reflect improvement in ADL's/pain reduction. Baseline 5/50, (POOJA score of </=10/50 (</=20%) represents minimal to no disability)    Darline Gonzalez to increase core/B LE strength in deficient muscles by >/= 1/2 MMT grade to improve dynamic stability during  household/occupational/recreational tasks.    Darline Gonzalez will report 75% reduction in pain during ADLs to improve tolerance to household activities.    Darline Gonzalez will increase lumbar mobility to WFL/symmetrical without pain for unlimited ability to perform home household/occupational/recreational tasks.    Darline Gonzalez will demonstrate good posture with <2 cues for correction during 45 minute treatment session in order to enhance body mechanics with ADLs, functional mobility, and occupational duties.    Darline Gonzalez will be able to perform all aspects of bed mobility independently without pain or increase in time to complete.    Darline Gonzalez will demonstrate appropriate lifting technique with <2 cues for correction using golfer's lift to pick objects off the floor (at home, and light objects at work) and with moving/handling heavy packages while protecting integrity of low back to safely perform ADLs/return to work.    Darline Gonzalez will ambulate with IND on even surfaces without distance restriction, pain, major deviation or instability to improve participation in household/recreational/occupational duties.    Darline Gonzalez will be able to ascend/descend > or equal to 8 steps with use of unilateral handrail reciprocally without pain/difficulty in order for patient to be able to ambulate safely on all levels of home and in the community.    Darline Gonzalez will demonstrate independence and report compliance with HEP to facilitate independent rehab program upon discharge.

## 2024-06-05 DIAGNOSIS — F41.1 GENERALIZED ANXIETY DISORDER: ICD-10-CM

## 2024-06-05 RX ORDER — ESCITALOPRAM OXALATE 10 MG/1
10 TABLET ORAL DAILY
Qty: 90 TABLET | Refills: 0 | Status: SHIPPED | OUTPATIENT
Start: 2024-06-05 | End: 2024-09-03

## 2024-06-10 ENCOUNTER — APPOINTMENT (OUTPATIENT)
Dept: PHYSICAL THERAPY | Facility: CLINIC | Age: 74
End: 2024-06-10
Payer: MEDICARE

## 2024-06-10 DIAGNOSIS — K21.00 GASTROESOPHAGEAL REFLUX DISEASE WITH ESOPHAGITIS WITHOUT HEMORRHAGE: ICD-10-CM

## 2024-06-10 RX ORDER — PANTOPRAZOLE SODIUM 40 MG/1
40 TABLET, DELAYED RELEASE ORAL DAILY
Qty: 90 TABLET | Refills: 0 | Status: SHIPPED | OUTPATIENT
Start: 2024-06-10 | End: 2024-09-08

## 2024-06-12 ENCOUNTER — APPOINTMENT (OUTPATIENT)
Dept: PHYSICAL THERAPY | Facility: CLINIC | Age: 74
End: 2024-06-12
Payer: MEDICARE

## 2024-06-24 ENCOUNTER — APPOINTMENT (OUTPATIENT)
Dept: PHYSICAL THERAPY | Facility: CLINIC | Age: 74
End: 2024-06-24
Payer: MEDICARE

## 2024-06-24 DIAGNOSIS — K21.00 GASTROESOPHAGEAL REFLUX DISEASE WITH ESOPHAGITIS WITHOUT HEMORRHAGE: ICD-10-CM

## 2024-06-24 RX ORDER — PANTOPRAZOLE SODIUM 40 MG/1
40 TABLET, DELAYED RELEASE ORAL DAILY
Qty: 90 TABLET | Refills: 0 | Status: SHIPPED | OUTPATIENT
Start: 2024-06-24 | End: 2024-09-22

## 2024-06-27 DIAGNOSIS — H57.9 DISCOLORATION OF EYE: ICD-10-CM

## 2024-06-27 DIAGNOSIS — R32 URINARY INCONTINENCE, UNSPECIFIED TYPE: Primary | ICD-10-CM

## 2024-06-27 NOTE — PROGRESS NOTES
PHYSICAL THERAPY TREATMENT    Patient Name: Darline Gonzalez  MRN: [id number]  Today's Date: 7/1/2024       Insurance:  Visit number: 3  Insurance Type: Payor: MEDICARE / Plan: MEDICARE PART A AND B / Product Type: *No Product type* /   Certification Period Start Date: 05/30/24 for BPPV   Certification Period End Date: 08/28/24 for BPPV  Certification Period Start Date: 06/04/24 for LBP  Certification Period End Date: 09/02/24 for LBP   Referred by: Jo Sanchez MD    ** LBP added to PT POC on 6/4/2025**                           Assessment:   Progress towards goals: Added lumbar spine to PT POC today. Pt appears extension bias today based upon lumbar repeated motion screening. HEP updated with extension based interventions and core/prox hip strengthening. She was receptive to all provided education today.   Pt response to tx: Dec pain levels  Justification for skilled care:  to decrease pain levels related to LBP and to decrease dizziness to restore PLOF      Plan updated related to LBP: Treatment/Interventions: Aquatic therapy, Canalith repositioning, Cryotherapy, Dry needling, Education/ Instruction, Gait training, Manual therapy, Neuromuscular re-education, Therapeutic activities, Therapeutic exercises  PT Plan: Skilled PT  PT Frequency:  (1-2x per week)  Duration: for 10-12 weeks  Certification Period Start Date: 06/04/24  Certification Period End Date: 09/02/24  NV: complete CRP as able/tolerated; assess response to prone lying/standing extension; core/prox hip strengthening; pelvic assessment if needed      Therapy Diagnosis:   Problem List Items Addressed This Visit             ICD-10-CM       ENT    Vertigo - Primary R42       Musculoskeletal and Injuries    Chronic low back pain M54.50, G89.29         Subjective    Darline Gonzalez  is a 74 y.o. female  presenting to the clinic with LBP. Pain located in R gluteal region.  Years ago she would get sciatic nerve issues which would come and go.     Darline ZENDEJAS  "Lisa  reports symptoms began about 2-3 months ago. Reports pain is worse with laying in bed. Starts in R gluteal region then travels down front of R thigh to R ankle sometimes.   Walking around and moving seems to help the pain.   Reports no issues with sitting or walking or standing,  Reports laying on R side in bed seems to help the pain. Denies any INC pain when rolling over in bed.  Reports R buttocks pain currently rated 3/10 and R LE symptoms to just past the knee at start of session.      Darline Gonzalez  denies any bowel or bladder changes except for some mild incontinence with walking to bathroom at night.       PLOF: IND   CLOF: IND   Functional limitations: see above      Precautions:  Fall Risk: Moderate   \"Pronounces name Sure-ree\"  **+osteopenia in L femoral neck, lumbar spine normal on recent DEXA scan**   **+pacemaker**  **+latex allergy**  Denies: DM, blood thinner medication use, epilepsy/seizures, or other known neuro/pulmonary problems   +pacemaker  H/o breast CA - in remission since 2012; breast lumpectomy in 2011 plus s/p chemo and radiation  +HTN  H/o vertigo in 1980's   +neuropathy in B feet after chemo and radiation  H/o HA's and migraines   Previous surgeries include: none orthopedically related     Objective       Treatments:  Therapeutic Activity    Assessment of pt's LBP completed today:   Myotomes/Strength (MMT in sitting):  Hip Flex (L2) R/L: 4+/4+  Hip Add R/L: 4/4  Hip Abd R/L: 4/4  Knee Ext (L3) R/L: 4+* R groin pain / 4  Knee Flex R/L: 4+/4  Ankle DF (L4) R/L:  4+/4+   Abdominals (sitting unsupported): 3+  Back Extensors (sitting unsupported):  4    Range of Motion/Repeated Movements:  **R buttocks pain currently rated 3/10 and no R LE symptoms at start of movement screen**  Standing Lumbar Flexion: to mid shins, no pain  Repeated Loaded Flexion: No change in pain  Standing Lumbar Ext: min limited   Repeated Loaded Ext: DEC R buttocks pain   Side Bend R/L: min limited B with R " SB worse than L side   Prone Lie: abolished R buttocks pain and No R LE symptoms present    Special Tests:  Slump R: negative    Palpation: INC soreness upon palpation to medial R sacral border and R lumbar paraspinals           Therapeutic Exercise  Access Code: [email or web address]  - Lying Prone  - 2-5 x daily - 7 x weekly - 2-5 minutes hold  - Standing Lumbar Extension  - 2-5 x daily - 7 x weekly - 1 sets - 10 reps - 1-2 seconds hold  - Seated Transversus Abdominis Bracing  - 1 x daily - 7 x weekly - 2-3 sets - 10 reps - 1-2 seconds hold  - Seated Hip Abduction with RTB Resistance  - 1 x daily - 7 x weekly - 2 sets - 12 reps - 1-2 seconds hold      EDUCATION:  Educated pt regarding benefit and purpose of skilled PT services along with results of examination findings and how this correlates to their chief complaint.     Added Lumbar Spine Goals:  Darline Gonzalez will report driving with 75% less pain to allow for return to work and ADLs without limitation.     Darline Gonzalez will report 75% improvement in sleep status in order to attain adequate rest.    Darline Gonzalezwill improve Oswestry (POOJA) score by at least 5 points  in order to reflect improvement in ADL's/pain reduction. Baseline 5/50, (POOJA score of </=10/50 (</=20%) represents minimal to no disability)    Darline Gonzalez to increase core/B LE strength in deficient muscles by >/= 1/2 MMT grade to improve dynamic stability during household/occupational/recreational tasks.    Darline Gonzalez will report 75% reduction in pain during ADLs to improve tolerance to household activities.    Darline Gonzalez will increase lumbar mobility to WFL/symmetrical without pain for unlimited ability to perform home household/occupational/recreational tasks.    Darline Gonzalez will demonstrate good posture with <2 cues for correction during 45 minute treatment session in order to enhance body mechanics with ADLs, functional mobility, and occupational duties.    Darline Gonzalez  will be able to perform all aspects of bed mobility independently without pain or increase in time to complete.    Darline Gonzalez will demonstrate appropriate lifting technique with <2 cues for correction using golfer's lift to pick objects off the floor (at home, and light objects at work) and with moving/handling heavy packages while protecting integrity of low back to safely perform ADLs/return to work.    Darline Gonzalez will ambulate with IND on even surfaces without distance restriction, pain, major deviation or instability to improve participation in household/recreational/occupational duties.    Darline Gonzalez will be able to ascend/descend > or equal to 8 steps with use of unilateral handrail reciprocally without pain/difficulty in order for patient to be able to ambulate safely on all levels of home and in the community.    Darline Gonzalez will demonstrate independence and report compliance with HEP to facilitate independent rehab program upon discharge.

## 2024-07-01 ENCOUNTER — DOCUMENTATION (OUTPATIENT)
Dept: PHYSICAL THERAPY | Facility: CLINIC | Age: 74
End: 2024-07-01
Payer: MEDICARE

## 2024-07-01 ENCOUNTER — TREATMENT (OUTPATIENT)
Dept: PHYSICAL THERAPY | Facility: CLINIC | Age: 74
End: 2024-07-01
Payer: MEDICARE

## 2024-07-01 DIAGNOSIS — R42 VERTIGO: Primary | ICD-10-CM

## 2024-07-01 DIAGNOSIS — M54.50 CHRONIC LOW BACK PAIN, UNSPECIFIED BACK PAIN LATERALITY, UNSPECIFIED WHETHER SCIATICA PRESENT: ICD-10-CM

## 2024-07-01 DIAGNOSIS — G89.29 CHRONIC LOW BACK PAIN, UNSPECIFIED BACK PAIN LATERALITY, UNSPECIFIED WHETHER SCIATICA PRESENT: ICD-10-CM

## 2024-07-01 NOTE — PROGRESS NOTES
Physical Therapy                 Therapy Communication Note    Patient Name: Darline Gonzalez  MRN: [id number]  Today's Date: 7/1/2024     Discipline: Physical Therapy    Missed Time: Cancel    Comment: Pt canceled due to having panic attack related to other conditions. This information was relayed to this PT from [facility] staff.

## 2024-07-03 NOTE — PROGRESS NOTES
PHYSICAL THERAPY TREATMENT    Patient Name: Darline Gonzalez  MRN: [id number]  Today's Date: 7/8/2024  Time Calculation  Start Time: 1153  Stop Time: 1233  Time Calculation (min): 40 min    Insurance:  Visit number: 3  Insurance Type: Payor: MEDICARE / Plan: MEDICARE PART A AND B / Product Type: *No Product type* /   Certification Period Start Date: 05/30/24 for BPPV   Certification Period End Date: 08/28/24 for BPPV  Certification Period Start Date: 06/04/24 for LBP  Certification Period End Date: 09/02/24 for LBP   Referred by: Jo Sanchez MD    ** LBP added to PT POC on 6/4/2025**     PT Therapeutic Procedures Time Entry  Therapeutic Activity Time Entry: 25  PT Modalities Time Entry  Canalith Repositioning Time Entry: 15                  Assessment:   Progress towards goals: Positional Testing revealed L posterior canalithiasis today which is an improvement from cupulolithiasis found at prior session. Pt able to tolerate CRP much better this visit as well. Switched to Sement CRP due to neck pain with pt tolerating better. Pt's nystagmus and symptoms improved with subsequent CRP but did not fully resolve - will continue to monitor. Post CRP, pt was able to exit treatment room IND and safely but requested to sit in waiting with glass of water for period of time after.   Pt response to tx: Improving dizziness.   Justification for skilled care:  to decrease pain levels related to LBP and to decrease dizziness to restore PLOF      Plan: complete CRP as able/tolerated; assess response to prone lying/standing extension; core/prox hip strengthening; pelvic assessment if needed      Therapy Diagnosis:   Problem List Items Addressed This Visit             ICD-10-CM       ENT    Vertigo - Primary R42       Musculoskeletal and Injuries    Chronic low back pain M54.50, G89.29       Subjective    Reports she would like to address her dizziness today. Reports she felt off the day of the BPPV assessment and partial CRP.  "Reports she felt better in the days that followed. Then the dizziness started to return again over the last couple days.   No dizziness currently. Reports she was dizzy most recently on Saturday doing some stuff around her house - moving her head and bending over more.   In terms of her LBP, she did notice relief with her HEP.       Precautions:  Fall Risk: Moderate   \"Pronounces name Sure-ree\"  **+osteopenia in L femoral neck, lumbar spine normal on recent DEXA scan**   **+pacemaker**  **+latex allergy**  Denies: DM, blood thinner medication use, epilepsy/seizures, or other known neuro/pulmonary problems   +pacemaker  H/o breast CA - in remission since 2012; breast lumpectomy in 2011 plus s/p chemo and radiation  +HTN  H/o vertigo in 1980's   +neuropathy in B feet after chemo and radiation  H/o HA's and migraines   Previous surgeries include: none orthopedically related     Objective       Treatments:  Therapeutic Activity    Positional Testing (with goggles):   Loaded [place]-Hallpike R/L: not tested / positive with rotational upbeating nystagmus lasting < 40 seconds   Horizontal Roll Test R/L: not tested     -Explained relevant anatomy related to vestibular system and pt's chief complaint  -Discussed pathophysiology of BPPV  -Risk factors associated with increased prevalence of BPPV  -Education on purpose of CRP maneuvers and post-CRP restrictions and precautions (avoiding looking up/down, stay upright until bedtime)  -Pt to monitor dizziness/vertigo symptoms until next appointment to assess effectiveness of CRP done within session today. Handout provided  -Answered pt's questions in full      Canalith Re-positioning   L modified Epley x 1 trial then switched to L Semont x 1 trial due to neck pain (includes time between CRP)        The following interventions were not performed this visit:   Therapeutic Exercise  Access Code: [email or web address]  - Lying Prone  - 2-5 x daily - 7 x weekly - 2-5 minutes hold  - Standing Lumbar " Extension  - 2-5 x daily - 7 x weekly - 1 sets - 10 reps - 1-2 seconds hold  - Seated Transversus Abdominis Bracing  - 1 x daily - 7 x weekly - 2-3 sets - 10 reps - 1-2 seconds hold  - Seated Hip Abduction with RTB Resistance  - 1 x daily - 7 x weekly - 2 sets - 12 reps - 1-2 seconds hold      EDUCATION:  See above

## 2024-07-08 ENCOUNTER — TREATMENT (OUTPATIENT)
Dept: PHYSICAL THERAPY | Facility: CLINIC | Age: 74
End: 2024-07-08
Payer: MEDICARE

## 2024-07-08 DIAGNOSIS — G89.29 CHRONIC LOW BACK PAIN, UNSPECIFIED BACK PAIN LATERALITY, UNSPECIFIED WHETHER SCIATICA PRESENT: ICD-10-CM

## 2024-07-08 DIAGNOSIS — R42 VERTIGO: Primary | ICD-10-CM

## 2024-07-08 DIAGNOSIS — M54.50 CHRONIC LOW BACK PAIN, UNSPECIFIED BACK PAIN LATERALITY, UNSPECIFIED WHETHER SCIATICA PRESENT: ICD-10-CM

## 2024-07-08 PROCEDURE — 97530 THERAPEUTIC ACTIVITIES: CPT | Mod: GP

## 2024-07-08 PROCEDURE — 95992 CANALITH REPOSITIONING PROC: CPT | Mod: GP

## 2024-07-12 NOTE — PROGRESS NOTES
PHYSICAL THERAPY TREATMENT    Patient Name: Darline Gonzalez  MRN: [id number]  Today's Date: 7/15/2024  Time Calculation  Start Time: 1346  Stop Time: 1421  Time Calculation (min): 35 min    Insurance:  Visit number: 4  Insurance Type: Payor: MEDICARE / Plan: MEDICARE PART A AND B / Product Type: *No Product type* /   Certification Period Start Date: 05/30/24 for BPPV   Certification Period End Date: 08/28/24 for BPPV  Certification Period Start Date: 06/04/24 for LBP  Certification Period End Date: 09/02/24 for LBP   Referred by: No ref. provider found    ** LBP added to PT POC on 6/4/2025**     PT Therapeutic Procedures Time Entry  Therapeutic Activity Time Entry: 24  PT Modalities Time Entry  Canalith Repositioning Time Entry: 11                  Assessment:  Darline Gonzalez presents to PT treatment session with ongoing dizziness. Positive loaded Serge Hallpike bilaterally with observed torsional nystagmus of short duration (<60 seconds) consistent with bilateral posterior canalithiasis. Treated left/more symptomatic side this visit. Performed subsequent repositioning, L Modified Epley x1 and Semont x1 , and will monitor effectiveness.      Darline's response to tx: mild increase symptoms- reports this has been consistent with previous vestibular txs. Requested to sit in lobby prior to exiting clinic.     Darline's Progress towards goals: addressed positional dizziness    Justification for skilled care: Darline Gonzalez continues to have intermittent dizziness limiting participation in ADLs, IADLs, and meaningful activities. Further skilled therapy services are warranted to decrease fall risk and to address the remaining impairments in order to progress towards functional goals for the Darline  to fully participate in an active lifestyle.     Darline left session with all questions answered. No evidence of gait instability observed exiting clinic.         Plan: complete CRP as able/tolerated; assess response to prone  "lying/standing extension; core/prox hip strengthening; pelvic assessment if needed      Therapy Diagnosis:   Problem List Items Addressed This Visit             ICD-10-CM       ENT    Vertigo R42       Musculoskeletal and Injuries    Chronic low back pain M54.50, G89.29     Other Visit Diagnoses         Codes    Sciatica of right side     M54.31              Subjective    Darline Gonzalez reports daily reduction in dizziness since last session. Would like to focus on dizziness again this session. No dizziness currently. Has been doing \"real well with sciatica except for last night\".     Precautions:  Fall Risk: Moderate   \"Pronounces name Sure-ree\"  **+osteopenia in L femoral neck, lumbar spine normal on recent DEXA scan**   **+pacemaker**  **+latex allergy**  Denies: DM, blood thinner medication use, epilepsy/seizures, or other known neuro/pulmonary problems   +pacemaker  H/o breast CA - in remission since 2012; breast lumpectomy in 2011 plus s/p chemo and radiation  +HTN  H/o vertigo in 1980's   +neuropathy in B feet after chemo and radiation  H/o HA's and migraines   Previous surgeries include: none orthopedically related       Objective   Positional Testing: without goggles  Serge-Hallpike Right = positive torsional   [place]-Hallpike Left x2  = positive torsional   Horizontal Roll Test Right = negative   Horizontal Roll Test Left = negative        Treatments:  Therapeutic Activity    Time spent on positional testing for multiple canals and assessing symptoms   Answered Darline Gonzalez's questions in full  Reviewed pathophysiology of BPPV, rationale for CRP  Risk factors associated with INC prevalence of BPPV, possibly that it can return  Post CRP precautions reviewed: Darline Gonzalez to avoid looking up/down, quick head turns during ADLs for a few hours after repositioning, to avoid lying down until bed time  Darline advised that she may feel off balance after the reposition and to use safety measures at home/device " with walking or resting as needed to decrease fall risk, Darline Gonzalez verbalized understanding.  Recent evidence that vitamin D and calcium supplementation reduces recurrences of BPPV especially when serum vitamin D is subnormal. (Jean-Claude WARREN et al, 2020)      Canalith Re-Positioning   L Semont x 1 trial and modified Epley x 1 trial  (includes time between CRP and guarding pt for safety)      Therapeutic Exercise: NP this session  Access Code: [email or web address]  - Lying Prone  - 2-5 x daily - 7 x weekly - 2-5 minutes hold  - Standing Lumbar Extension  - 2-5 x daily - 7 x weekly - 1 sets - 10 reps - 1-2 seconds hold  - Seated Transversus Abdominis Bracing  - 1 x daily - 7 x weekly - 2-3 sets - 10 reps - 1-2 seconds hold  - Seated Hip Abduction with RTB Resistance  - 1 x daily - 7 x weekly - 2 sets - 12 reps - 1-2 seconds hold      EDUCATION: see above

## 2024-07-15 ENCOUNTER — TREATMENT (OUTPATIENT)
Dept: PHYSICAL THERAPY | Facility: CLINIC | Age: 74
End: 2024-07-15
Payer: MEDICARE

## 2024-07-15 DIAGNOSIS — R42 VERTIGO: ICD-10-CM

## 2024-07-15 DIAGNOSIS — M54.31 SCIATICA OF RIGHT SIDE: ICD-10-CM

## 2024-07-15 DIAGNOSIS — M54.50 CHRONIC LOW BACK PAIN, UNSPECIFIED BACK PAIN LATERALITY, UNSPECIFIED WHETHER SCIATICA PRESENT: ICD-10-CM

## 2024-07-15 DIAGNOSIS — G89.29 CHRONIC LOW BACK PAIN, UNSPECIFIED BACK PAIN LATERALITY, UNSPECIFIED WHETHER SCIATICA PRESENT: ICD-10-CM

## 2024-07-15 PROCEDURE — 95992 CANALITH REPOSITIONING PROC: CPT | Mod: GP

## 2024-07-15 PROCEDURE — 97530 THERAPEUTIC ACTIVITIES: CPT | Mod: GP

## 2024-07-18 ENCOUNTER — LAB (OUTPATIENT)
Dept: LAB | Facility: LAB | Age: 74
End: 2024-07-18
Payer: MEDICARE

## 2024-07-18 ENCOUNTER — APPOINTMENT (OUTPATIENT)
Dept: PRIMARY CARE | Facility: CLINIC | Age: 74
End: 2024-07-18
Payer: MEDICARE

## 2024-07-18 VITALS
HEIGHT: 68 IN | OXYGEN SATURATION: 98 % | HEART RATE: 60 BPM | BODY MASS INDEX: 30.02 KG/M2 | SYSTOLIC BLOOD PRESSURE: 116 MMHG | DIASTOLIC BLOOD PRESSURE: 73 MMHG | WEIGHT: 198.1 LBS

## 2024-07-18 DIAGNOSIS — I10 BENIGN ESSENTIAL HYPERTENSION: Primary | ICD-10-CM

## 2024-07-18 DIAGNOSIS — H57.9 DISCOLORATION OF EYE: ICD-10-CM

## 2024-07-18 DIAGNOSIS — R32 URINARY INCONTINENCE, UNSPECIFIED TYPE: ICD-10-CM

## 2024-07-18 DIAGNOSIS — G47.33 OSA ON CPAP: Chronic | ICD-10-CM

## 2024-07-18 PROCEDURE — 36415 COLL VENOUS BLD VENIPUNCTURE: CPT

## 2024-07-18 PROCEDURE — 1160F RVW MEDS BY RX/DR IN RCRD: CPT | Performed by: INTERNAL MEDICINE

## 2024-07-18 PROCEDURE — 3074F SYST BP LT 130 MM HG: CPT | Performed by: INTERNAL MEDICINE

## 2024-07-18 PROCEDURE — 1123F ACP DISCUSS/DSCN MKR DOCD: CPT | Performed by: INTERNAL MEDICINE

## 2024-07-18 PROCEDURE — 99213 OFFICE O/P EST LOW 20 MIN: CPT | Performed by: INTERNAL MEDICINE

## 2024-07-18 PROCEDURE — 80053 COMPREHEN METABOLIC PANEL: CPT

## 2024-07-18 PROCEDURE — 81003 URINALYSIS AUTO W/O SCOPE: CPT | Performed by: INTERNAL MEDICINE

## 2024-07-18 PROCEDURE — 1159F MED LIST DOCD IN RCRD: CPT | Performed by: INTERNAL MEDICINE

## 2024-07-18 PROCEDURE — 3078F DIAST BP <80 MM HG: CPT | Performed by: INTERNAL MEDICINE

## 2024-07-18 PROCEDURE — 1036F TOBACCO NON-USER: CPT | Performed by: INTERNAL MEDICINE

## 2024-07-18 ASSESSMENT — PATIENT HEALTH QUESTIONNAIRE - PHQ9
2. FEELING DOWN, DEPRESSED OR HOPELESS: NOT AT ALL
1. LITTLE INTEREST OR PLEASURE IN DOING THINGS: NOT AT ALL
SUM OF ALL RESPONSES TO PHQ9 QUESTIONS 1 AND 2: 0

## 2024-07-18 NOTE — PROGRESS NOTES
"Subjective   Patient ID: Darline Gonzalez is a 74 y.o. female who presents for Follow-up (Pt being seen today due to being told her eyes are yellow. Wants urine test, and BW done. Pt also states that sometimes when going up the stairs or even getting ready while sitting down she gets a pain between shoulder blades and starts panting, and breaking out in a cold sweat. Has had scans done, pacemaker put in ect. Wants to know if having panic attacks? Pt states it does not seem right. Thinks she's ok with the pacemaker. Also states she is tired all of the time. Did get shingles, RSV Shot. ).    HPI   Has counseling appointment coming up.  Still has depression.  Tolerating medication  Feels tired all the time.  She also has had episodes of pain between shoulder blades and cold sweats while doing her make-up.  She recently had a stress test which was okay  She also has a pacemaker  Undergoing vestibular therapy which is helping her vertigo  Other people commented that her eyes are jaundiced.  Has discoloration of urine  Back pain is better  Has not been using her CPAP  Taking naps all day  Review of Systems  No fever chills or night sweats  No nausea vomiting or heartburn  No PND or orthopnea  Objective   /73   Pulse 60   Ht 1.727 m (5' 8\")   Wt 89.9 kg (198 lb 1.6 oz)   SpO2 98%   BMI 30.12 kg/m²     Physical Exam  In general, well-appearing, not in acute distress, alert and oriented.  HEENT: Pupils PERRLA.  No pallor or icterus.  Neck: No lymphadenopathy, no stiffness.  Supple.  No enlargement of thyroid.No JVD  Chest: Clear to auscultation, good air entry.  CVS: S1 and S2 regular.  No murmur, no gallop, S3 or S4.  No peripheral edema.  No carotid bruit.  Abdomen: Soft, no tenderness, no hepatosplenomegaly.  Extremities: No calf tenderness.  No peripheral edema.  No knee or ankle effusion.  No finger synovitis.  No clubbing or cyanosis.  Neuro: No focal deficits.  Psych: Mood and affect normal.  Good judgment " and insight  Skin:No rash    Assessment/Plan   Problem List Items Addressed This Visit             ICD-10-CM    Benign essential hypertension - Primary I10    TRINA on CPAP (Chronic) G47.33     Will check her blood and urine today  Call cardiology and see if she needs further cardiac testing due to her chest pain and sweating  Recent stress test was okay      Continue counseling and medication    Fatigue could be from tight control of blood pressure.  Check blood pressure at home.  Improve hydration  Call me if BP is below 1/10/1970  Start using the CPAP.  This could improve fatigue  Continue vestibular therapy  Follow-up in 3 months and earlier if symptoms

## 2024-07-19 LAB
ALBUMIN SERPL BCP-MCNC: 4.6 G/DL (ref 3.4–5)
ALP SERPL-CCNC: 72 U/L (ref 33–136)
ALT SERPL W P-5'-P-CCNC: 30 U/L (ref 7–45)
ANION GAP SERPL CALC-SCNC: 19 MMOL/L (ref 10–20)
AST SERPL W P-5'-P-CCNC: 29 U/L (ref 9–39)
BILIRUB SERPL-MCNC: 0.8 MG/DL (ref 0–1.2)
BUN SERPL-MCNC: 21 MG/DL (ref 6–23)
CALCIUM SERPL-MCNC: 10.3 MG/DL (ref 8.6–10.6)
CHLORIDE SERPL-SCNC: 105 MMOL/L (ref 98–107)
CO2 SERPL-SCNC: 24 MMOL/L (ref 21–32)
CREAT SERPL-MCNC: 0.99 MG/DL (ref 0.5–1.05)
EGFRCR SERPLBLD CKD-EPI 2021: 59 ML/MIN/1.73M*2
GLUCOSE SERPL-MCNC: 123 MG/DL (ref 74–99)
POTASSIUM SERPL-SCNC: 4.5 MMOL/L (ref 3.5–5.3)
PROT SERPL-MCNC: 7.4 G/DL (ref 6.4–8.2)
SODIUM SERPL-SCNC: 143 MMOL/L (ref 136–145)

## 2024-07-19 NOTE — PROGRESS NOTES
PHYSICAL THERAPY TREATMENT    Patient Name: Darline Gonzalez  MRN: [id number]  Today's Date: 7/22/2024       Insurance:  Visit number: 5  Insurance Type: Payor: MEDICARE / Plan: MEDICARE PART A AND B / Product Type: *No Product type* /   Certification Period Start Date: 05/30/24 for BPPV   Certification Period End Date: 08/28/24 for BPPV  Certification Period Start Date: 06/04/24 for LBP  Certification Period End Date: 09/02/24 for LBP   Referred by: No ref. provider found    ** LBP added to PT POC on 6/4/2025**                           Assessment:  Darline Gonzalez presents to PT treatment session with ongoing dizziness. Positive loaded [place] Hallpike bilaterally with observed torsional nystagmus of short duration (<60 seconds) consistent with bilateral posterior canalithiasis. Treated left/more symptomatic side this visit. Performed subsequent repositioning, L Modified Epley x1 and Semont x1 , and will monitor effectiveness.      Darline's response to tx: mild increase symptoms- reports this has been consistent with previous vestibular txs. Requested to sit in lobby prior to exiting clinic.     Darline's Progress towards goals: addressed positional dizziness    Justification for skilled care: Darline Gonzalez continues to have intermittent dizziness limiting participation in ADLs, IADLs, and meaningful activities. Further skilled therapy services are warranted to decrease fall risk and to address the remaining impairments in order to progress towards functional goals for the Darline  to fully participate in an active lifestyle.     Darline left session with all questions answered. No evidence of gait instability observed exiting clinic.         Plan: complete CRP as able/tolerated; assess response to prone lying/standing extension; core/prox hip strengthening; pelvic assessment if needed      Therapy Diagnosis:   Problem List Items Addressed This Visit             ICD-10-CM       ENT    Vertigo - Primary R42        "Musculoskeletal and Injuries    Chronic low back pain M54.50, G89.29           Subjective    Darline Gonzalez reports daily reduction in dizziness since last session. Would like to focus on dizziness again this session. No dizziness currently. Has been doing \"real well with sciatica except for last night\".     Precautions:  Fall Risk: Moderate   \"Pronounces name Sure-ree\"  **+osteopenia in L femoral neck, lumbar spine normal on recent DEXA scan**   **+pacemaker**  **+latex allergy**  Denies: DM, blood thinner medication use, epilepsy/seizures, or other known neuro/pulmonary problems   +pacemaker  H/o breast CA - in remission since 2012; breast lumpectomy in 2011 plus s/p chemo and radiation  +HTN  H/o vertigo in 1980's   +neuropathy in B feet after chemo and radiation  H/o HA's and migraines   Previous surgeries include: none orthopedically related       Objective   Positional Testing: without goggles  [place]-Hallpike Right = positive torsional   [place]-Hallpike Left x2  = positive torsional   Horizontal Roll Test Right = negative   Horizontal Roll Test Left = negative        Treatments:  Therapeutic Activity    Time spent on positional testing for multiple canals and assessing symptoms   Answered Darline Gonzalez's questions in full  Reviewed pathophysiology of BPPV, rationale for CRP  Risk factors associated with INC prevalence of BPPV, possibly that it can return  Post CRP precautions reviewed: Darline Gonzalez to avoid looking up/down, quick head turns during ADLs for a few hours after repositioning, to avoid lying down until bed time  Darline advised that she may feel off balance after the reposition and to use safety measures at home/device with walking or resting as needed to decrease fall risk, Darline Gonzalez verbalized understanding.  Recent evidence that vitamin D and calcium supplementation reduces recurrences of BPPV especially when serum vitamin D is subnormal. (Jean-Claude WARREN et al, 2020)      Canalith " Re-Positioning   L Semont x 1 trial and modified Epley x 1 trial  (includes time between CRP and guarding pt for safety)      Therapeutic Exercise: NP this session  Access Code: [email or web address]  - Lying Prone  - 2-5 x daily - 7 x weekly - 2-5 minutes hold  - Standing Lumbar Extension  - 2-5 x daily - 7 x weekly - 1 sets - 10 reps - 1-2 seconds hold  - Seated Transversus Abdominis Bracing  - 1 x daily - 7 x weekly - 2-3 sets - 10 reps - 1-2 seconds hold  - Seated Hip Abduction with RTB Resistance  - 1 x daily - 7 x weekly - 2 sets - 12 reps - 1-2 seconds hold      EDUCATION: see above

## 2024-07-22 ENCOUNTER — TREATMENT (OUTPATIENT)
Dept: PHYSICAL THERAPY | Facility: CLINIC | Age: 74
End: 2024-07-22
Payer: MEDICARE

## 2024-07-22 DIAGNOSIS — G89.29 CHRONIC LOW BACK PAIN, UNSPECIFIED BACK PAIN LATERALITY, UNSPECIFIED WHETHER SCIATICA PRESENT: ICD-10-CM

## 2024-07-22 DIAGNOSIS — R42 VERTIGO: Primary | ICD-10-CM

## 2024-07-22 DIAGNOSIS — M54.50 CHRONIC LOW BACK PAIN, UNSPECIFIED BACK PAIN LATERALITY, UNSPECIFIED WHETHER SCIATICA PRESENT: ICD-10-CM

## 2024-07-25 NOTE — PROGRESS NOTES
"PHYSICAL THERAPY TREATMENT    Patient Name: Darline Gonzalez  MRN: [id number]  Today's Date: 7/29/2024  Time Calculation  Start Time: 1432  Stop Time: 1511  Time Calculation (min): 39 min    Insurance:  Visit number: 5  Insurance Type: Payor: MEDICARE / Plan: MEDICARE PART A AND B / Product Type: *No Product type* /   Certification Period Start Date: 05/30/24 for BPPV   Certification Period End Date: 08/28/24 for BPPV  Certification Period Start Date: 06/04/24 for LBP  Certification Period End Date: 09/02/24 for LBP   Referred by: No ref. provider found    ** LBP added to PT POC on 6/4/2025**     PT Therapeutic Procedures Time Entry  Therapeutic Activity Time Entry: 30  PT Modalities Time Entry  Canalith Repositioning Time Entry: 9                  Assessment:  Darline Gonzalez presents to PT treatment session with ongoing dizziness. Positive loaded [place] Hallpike bilaterally with observed torsional nystagmus of short duration (<60 seconds) consistent with bilateral posterior canalithiasis. Treated left/more symptomatic side this visit. Performed subsequent repositioning, Modified Epley (with \"Dari Noelle\" shake due to non-resolving BPPV) and will monitor effectiveness.      Darline's response to tx: reduced intensity of dizziness reported and nystagmus observed with positional testing. Therapist provided cold towels due to perspiration and nausea.    Darline's Progress towards goals: addressed positional dizziness    Justification for skilled care: Darline Gonzalez continues to have intermittent dizziness limiting participation in ADLs, IADLs, and meaningful activities. Further skilled therapy services are warranted to decrease fall risk and to address the remaining impairments in order to progress towards functional goals for the Darline  to fully participate in an active lifestyle.     Darline left session with all questions answered and no increase in dizziness. No evidence of gait instability observed exiting clinic. " "    Re-assessment performed today after Darline Gonzalez has attended 5V for BPPV and low back pain. Pt has selected to treat BPPV vs low back pain at this time. Treatments have primarily consisted of: TherEx, TherAct, and Canalith Repositioning. Darline Gonzalez has demonstrated improvements in reduced intensity and frequency of dizziness since initial evaluation. She reports minimal dizziness during ADLs, limits bed mobility due to symptoms. Impairments in non-resolving BPPV and low back pain remain at this time limiting ability to perform bed mobility and household/recreational activities. Good progress made towards established goals. Skilled physical therapy services continue to be appropriate and beneficial at recommended frequency of 1x week for 4 weeks to advance functional status and attain therapy-related goals.         Plan: complete CRP as able/tolerated; assess response to prone lying/standing extension; core/prox hip strengthening; pelvic assessment if needed      Therapy Diagnosis:   Problem List Items Addressed This Visit             ICD-10-CM       ENT    Vertigo R42    Relevant Orders    Follow Up In Physical Therapy       Musculoskeletal and Injuries    Chronic low back pain M54.50, G89.29    Relevant Orders    Follow Up In Physical Therapy     Other Visit Diagnoses         Codes    Sciatica of right side     M54.31    Relevant Orders    Follow Up In Physical Therapy                Subjective    Darline Gonzalez reports reduced frequency & intensity of dizziness since last session. Pleased with vestibular PT progress thus far. Would like to continue addressing vestibular impairments and hold on low back until resolved. Also would like to report: \"I forgot to mention that I experience a sensation of falling backwards when performing quick step backwards or turning head\". Has been occurring since prior to initial eval and resulted in 2 falls without injuries. Denies any falls or complications since " "last session. Denies dizziness currently at rest.    Precautions:  Fall Risk: Moderate   \"Pronounces name Sure-ree\"  **+osteopenia in L femoral neck, lumbar spine normal on recent DEXA scan**   **+pacemaker**  **+latex allergy**  Denies: DM, blood thinner medication use, epilepsy/seizures, or other known neuro/pulmonary problems   +pacemaker  H/o breast CA - in remission since 2012; breast lumpectomy in 2011 plus s/p chemo and radiation  +HTN  H/o vertigo in 1980's   +neuropathy in B feet after chemo and radiation  H/o HA's and migraines   Previous surgeries include: none orthopedically related       Objective   Positional Testing: without goggles  [place]-Hallpike Right = positive torsional   [place]-Hallpike Left = positive torsional *more symptomatic side  Horizontal Roll Test Right = negative   Horizontal Roll Test Left = negative      Goals:  Darline Gonzalez will test negative for positional vertigo.  7/29/24: PROGRESSING- reduced symptoms and intensity of nystagmus since initial eval     Darline Gonzalze will report no complaint of positional imbalance or vertigo for one week with daily activities.  7/29/24: PROGRESSING- reports      Darline Gonzalez will decrease DHI by >/= 18 points (minimally clinically important difference) or </=34 points (16-34 Points is a mild handicap) in order to improve safety at home and decrease falls risk.   7/29/24: NOT ASSESSED THIS VISIT     Darline Gonzalez will complete sit <> stand transfers using BUE, equal weight bearing on LEs, and no major LOB at completion of transfer during 3/3 trials in order to enhance functional mobility and safety.  7/29/24: GOAL MET     Darline Gonzalez will ambulate with IND on even surfaces for community distances without imbalance or major deviation to safely return to [facility] and enhance access to the community.  7/29/24: GOAL MET     Darline Daveyremi will demonstrate independence and report compliance with HEP to facilitate independent rehab program " upon discharge.  7/29/24: GOAL MET- HEP not assigned but excellent compliance with post-CRPM restrictions    Darline AASHISH Gonzalez will report driving with 75% less pain to allow for return to work and ADLs without limitation.   7/29/24: GOAL MET     Darline Gonzalez will report 75% improvement in sleep status in order to attain adequate rest.  7/29/24: PROGRESSING- reports 50% improvement     Darline Gonzalezwill improve Oswestry (POOJA) score by at least 5 points  in order to reflect improvement in ADL's/pain reduction. Baseline 5/50, (POOJA score of </=10/50 (</=20%) represents minimal to no disability)  7/29/24: NOT ASSESSED THIS VISIT     Darline Gonzalez to increase core/B LE strength in deficient muscles by >/= 1/2 MMT grade to improve dynamic stability during household/occupational/recreational tasks.  7/29/24: NOT ASSESSED THIS VISIT    Darline Gonzalez will report 75% reduction in pain during ADLs to improve tolerance to household activities.  7/29/24: PROGRESSING- reports 50% improvement    Darline Gonzalez will increase lumbar mobility to WFL/symmetrical without pain for unlimited ability to perform home household/occupational/recreational tasks.  7/29/24: NOT ASSESSED THIS VISIT     Darline Gonzalez will demonstrate good posture with <2 cues for correction during 45 minute treatment session in order to enhance body mechanics with ADLs, functional mobility, and occupational duties.  7/29/24: NOT ASSESSED THIS VISIT    Darline Gonzalez will be able to perform all aspects of bed mobility independently without pain or increase in time to complete.  7/29/24: GOAL MET    Darline Gonzalez will demonstrate appropriate lifting technique with <2 cues for correction using golfer's lift to pick objects off the floor (at home, and light objects at work) and with moving/handling heavy packages while protecting integrity of low back to safely perform ADLs/return to work.  7/29/24: NOT ASSESSED THIS VISIT     Darline Gonzalez  "will ambulate with IND on even surfaces without distance restriction, pain, major deviation or instability to improve participation in household/recreational/occupational duties.  7/29/24: GOAL MET     Darline Gonzalez will be able to ascend/descend > or equal to 8 steps with use of unilateral handrail reciprocally without pain/difficulty in order for patient to be able to ambulate safely on all levels of home and in the community.  7/29/24: NOT ASSESSED THIS VISIT         Treatments:  Therapeutic Activity    Time spent on positional testing for multiple canals and assessing symptoms   Answered Darline Gonzalez's questions in full  Reviewed pathophysiology of BPPV, rationale for CRP  Risk factors associated with INC prevalence of BPPV, possibly that it can return  Post CRP precautions reviewed: Darline Gonzalez to avoid looking up/down, quick head turns during ADLs for a few hours after repositioning, to avoid lying down until bed time  Darline advised that she may feel off balance after the reposition and to use safety measures at home/device with walking or resting as needed to decrease fall risk, Darline Gonzalez verbalized understanding.  Rationale for \"Dari Noelle\" shake due to non-resolving BPPV  Re-assessment: see subjective, objective, assessment, and updated goals       Canalith Re-Positioning   L Modified Epley x 2 trials with \"Dari Noelle\" shake (includes time between CRP and guarding pt for safety)      Therapeutic Exercise: NP this session  Access Code: [email or web address]  - Lying Prone  - 2-5 x daily - 7 x weekly - 2-5 minutes hold  - Standing Lumbar Extension  - 2-5 x daily - 7 x weekly - 1 sets - 10 reps - 1-2 seconds hold  - Seated Transversus Abdominis Bracing  - 1 x daily - 7 x weekly - 2-3 sets - 10 reps - 1-2 seconds hold  - Seated Hip Abduction with RTB Resistance  - 1 x daily - 7 x weekly - 2 sets - 12 reps - 1-2 seconds hold      EDUCATION: see above     "

## 2024-07-29 ENCOUNTER — TREATMENT (OUTPATIENT)
Dept: PHYSICAL THERAPY | Facility: CLINIC | Age: 74
End: 2024-07-29
Payer: MEDICARE

## 2024-07-29 DIAGNOSIS — G89.29 CHRONIC LOW BACK PAIN, UNSPECIFIED BACK PAIN LATERALITY, UNSPECIFIED WHETHER SCIATICA PRESENT: ICD-10-CM

## 2024-07-29 DIAGNOSIS — M54.50 CHRONIC LOW BACK PAIN, UNSPECIFIED BACK PAIN LATERALITY, UNSPECIFIED WHETHER SCIATICA PRESENT: ICD-10-CM

## 2024-07-29 DIAGNOSIS — M54.31 SCIATICA OF RIGHT SIDE: ICD-10-CM

## 2024-07-29 DIAGNOSIS — R42 VERTIGO: ICD-10-CM

## 2024-07-29 PROCEDURE — 95992 CANALITH REPOSITIONING PROC: CPT | Mod: GP

## 2024-07-29 PROCEDURE — 97530 THERAPEUTIC ACTIVITIES: CPT | Mod: GP

## 2024-07-31 LAB
APPEARANCE UR: CLEAR
BILIRUB UR STRIP.AUTO-MCNC: NEGATIVE MG/DL
COLOR UR: ABNORMAL
GLUCOSE UR STRIP.AUTO-MCNC: NORMAL MG/DL
HOLD SPECIMEN: NORMAL
KETONES UR STRIP.AUTO-MCNC: NEGATIVE MG/DL
LEUKOCYTE ESTERASE UR QL STRIP.AUTO: NEGATIVE
NITRITE UR QL STRIP.AUTO: NEGATIVE
PH UR STRIP.AUTO: 5.5 [PH]
PROT UR STRIP.AUTO-MCNC: NEGATIVE MG/DL
RBC # UR STRIP.AUTO: ABNORMAL /UL
RBC #/AREA URNS AUTO: NORMAL /HPF
SP GR UR STRIP.AUTO: 1.01
UROBILINOGEN UR STRIP.AUTO-MCNC: NORMAL MG/DL
WBC #/AREA URNS AUTO: NORMAL /HPF

## 2024-08-04 NOTE — PROGRESS NOTES
PHYSICAL THERAPY TREATMENT    Patient Name: Darline Gonzalez  MRN: [id number]  Today's Date: 8/5/2024  Time Calculation  Start Time: 1650  Stop Time: 1735  Time Calculation (min): 45 min  Insurance:  Visit number: 6  Insurance Type: Payor: MEDICARE / Plan: MEDICARE PART A AND B / Product Type: *No Product type* /   Certification Period Start Date: 05/30/24 for BPPV   Certification Period End Date: 08/28/24 for BPPV  Certification Period Start Date: 06/04/24 for LBP  Certification Period End Date: 09/02/24 for LBP   Referred by: Jo Sanchez MD    ** LBP added to PT POC on 6/4/2025**    Assessment:  Darline Gonzalez presents to PT treatment session with ongoing dizziness. Symptomatic positive  side lying test bilaterally L>R without observed nystagmus of short duration (<10 seconds) consistent with bilateral posterior canalithiasis. Performed subsequent repositioning, Li Maneuver 2x for the left as this was most symptomatic side, and will monitor effectiveness.  Did observe a few beats of torsional nystagmus with 1st Li maneuver, but no symptoms on second CRP and Darline left without dizziness or increase in symptoms.    Darline's  response to tx: decreased dizziness    Darline's Progress towards goals: addressed positional dizziness    Justification for skilled care: Darline Gonzalez continues to have intermittent dizziness limiting participation in ADLs, IADLs, and meaningful activities. Further skilled therapy services are warranted to decrease fall risk and to address the remaining impairments in order to progress towards functional goals for the Darline  to fully participate in an active lifestyle.     Darline left session with all questions answered and no increase in symptoms.         Plan: monitor for resolution of BPPV  Resume LB when able: assess response to prone lying/standing extension; core/prox hip strengthening; pelvic assessment if needed      Therapy Diagnosis:   Problem List Items Addressed This  "Visit             ICD-10-CM    Vertigo - Primary R42    Chronic low back pain M54.50, G89.29     Other Visit Diagnoses         Codes    Sciatica of right side     M54.31              Subjective    Darline Gonzalez denies any falls or complications since last session. Darline Gonzalez reports that she does not seem to be as dizzy when getting out of bed.  Darline reports that she feels intermittent posterior LOB with 1 fall @ 2 months ago.  Darline would like to focus on dizziness today until resolved.     Compliance with HEP: not applicable due to BPPV treatment.    Pain:  0/10    Precautions:  Fall Risk: Moderate   \"Pronounces name Sure-ree\"  **+osteopenia in L femoral neck, lumbar spine normal on recent DEXA scan**   **+pacemaker**  **+latex allergy**  Denies: DM, blood thinner medication use, epilepsy/seizures, or other known neuro/pulmonary problems   +pacemaker  H/o breast CA - in remission since 2012; breast lumpectomy in 2011 plus s/p chemo and radiation  +HTN  H/o vertigo in 1980's   +neuropathy in B feet after chemo and radiation  H/o HA's and migraines   Previous surgeries include: none orthopedically related       Objective   Positional Testing: without goggles  Horizontal Roll Test Right = negative   Horizontal Roll Test Left = negative  Side Lying Test Right = symptomatic positive   Side Lying Test Left = symptomatic positive  L>R      Treatments:  Therapeutic Activity: 40 minutes    Much time spent on Answering Darline Gonzalez's questions in full.  Reviewed relevant anatomy using model/BPPV video   Reviewed pathophysiology of BPPV using model, rationale for CRP.  Time spent on positional testing for multiple canals and assessing symptoms.  Risk factors associated with INC prevalence of BPPV, possibly that it can return.  Reviewed post CRP precautions: Darline Gonzalez to avoid looking up/down, quick head turns during ADLs for a few hours after repositioning, to avoid lying down until bed time.  Darline " advised that she may feel off balance after the reposition and to use safety measures at home/device with walking or resting as needed to decrease fall risk, Darline Gonzalez verbalized understanding.  Darline educated on [place] phenomenon, balance system and components  Darline Gonzalez Educated on some strategies for management of non-resolving BPPV including:   option of holding each position longer vs going through the positions quicker in attempts to keep the otoconia flowing  recommendation of strict adherence with post repositioning guidelines and avoiding excessive head motions  multiple canal management of most symptomatic canal that day vs bilateral    Canalith Reposition:    5 minutes  Li Maneuver 2x includes time between CRP and guarding pt for safety  Use of rolled towel behind c-spine with good relief reported.      Assigned HEP: [employer] Access Code: [email or web address]      EDUCATION: see above     Time Calculation  Start Time: 1650  Stop Time: 1735  Time Calculation (min): 45 min     PT Therapeutic Procedures Time Entry  Therapeutic Activity Time Entry: 40  PT Modalities Time Entry  Canalith Repositioning Time Entry: 5

## 2024-08-05 ENCOUNTER — TREATMENT (OUTPATIENT)
Dept: PHYSICAL THERAPY | Facility: CLINIC | Age: 74
End: 2024-08-05
Payer: MEDICARE

## 2024-08-05 DIAGNOSIS — M54.31 SCIATICA OF RIGHT SIDE: ICD-10-CM

## 2024-08-05 DIAGNOSIS — M54.50 CHRONIC LOW BACK PAIN, UNSPECIFIED BACK PAIN LATERALITY, UNSPECIFIED WHETHER SCIATICA PRESENT: ICD-10-CM

## 2024-08-05 DIAGNOSIS — R42 VERTIGO: Primary | ICD-10-CM

## 2024-08-05 DIAGNOSIS — G89.29 CHRONIC LOW BACK PAIN, UNSPECIFIED BACK PAIN LATERALITY, UNSPECIFIED WHETHER SCIATICA PRESENT: ICD-10-CM

## 2024-08-05 PROCEDURE — 97530 THERAPEUTIC ACTIVITIES: CPT | Mod: GP,59

## 2024-08-05 PROCEDURE — 95992 CANALITH REPOSITIONING PROC: CPT | Mod: GP

## 2024-08-06 ENCOUNTER — TELEPHONE (OUTPATIENT)
Dept: PRIMARY CARE | Facility: CLINIC | Age: 74
End: 2024-08-06
Payer: MEDICARE

## 2024-08-12 ENCOUNTER — TREATMENT (OUTPATIENT)
Dept: PHYSICAL THERAPY | Facility: CLINIC | Age: 74
End: 2024-08-12
Payer: MEDICARE

## 2024-08-12 DIAGNOSIS — G89.29 CHRONIC LOW BACK PAIN, UNSPECIFIED BACK PAIN LATERALITY, UNSPECIFIED WHETHER SCIATICA PRESENT: ICD-10-CM

## 2024-08-12 DIAGNOSIS — R42 VERTIGO: ICD-10-CM

## 2024-08-12 DIAGNOSIS — M54.50 CHRONIC LOW BACK PAIN, UNSPECIFIED BACK PAIN LATERALITY, UNSPECIFIED WHETHER SCIATICA PRESENT: ICD-10-CM

## 2024-08-12 DIAGNOSIS — M54.31 SCIATICA OF RIGHT SIDE: ICD-10-CM

## 2024-08-12 PROCEDURE — 97530 THERAPEUTIC ACTIVITIES: CPT | Mod: GP

## 2024-08-12 PROCEDURE — 95992 CANALITH REPOSITIONING PROC: CPT | Mod: GP

## 2024-08-12 NOTE — PROGRESS NOTES
PHYSICAL THERAPY TREATMENT    Patient Name: Darline Gonzalez  MRN: [id number]  Today's Date: 8/12/2024  Time Calculation  Start Time: 1605  Stop Time: 1632  Time Calculation (min): 27 min    Insurance:  Visit number: 7  Insurance Type: Payor: MEDICARE / Plan: MEDICARE PART A AND B / Product Type: *No Product type* /   Certification Period Start Date: 05/30/24 for BPPV   Certification Period End Date: 08/28/24 for BPPV  Certification Period Start Date: 06/04/24 for LBP  Certification Period End Date: 09/02/24 for LBP   Referred by: Jo Sanchez MD    ** LBP added to PT POC on 6/4/2025**    Assessment:  Darline Gonzalez presents to PT treatment session with ongoing dizziness. Symptomatic positive  [place] Hallpike test bilaterally  with observed nystagmus of short duration (<20 seconds) consistent with bilateral posterior canalithiasis. Nystagmus and subjective report of dizziness with equal intensity, therapist selected to continue treating L side. Performed subsequent repositioning, Li Maneuver 1x for the left and will monitor effectiveness.    Darline's response to tx: No increased dizziness, moderate nausea post CRPM. Unable to tolerate further testing and maneuvers. Required seated rest break in lobby prior to exiting clinic.     Darline's Progress towards goals: addressed positional dizziness    Justification for skilled care: Darline Gonzalez continues to have intermittent dizziness limiting participation in ADLs, IADLs, and meaningful activities. Further skilled therapy services are warranted to decrease fall risk and to address the remaining impairments in order to progress towards functional goals for the Darline  to fully participate in an active lifestyle.     Darline left session with all questions answered. No evidence of gait instability observed exiting clinic.       Plan:   BPPV positional testing and subsequent CRPM PRN  Resume LB when able: assess response to prone lying/standing extension; core/prox hip  "strengthening; pelvic assessment if needed      Therapy Diagnosis:   Problem List Items Addressed This Visit             ICD-10-CM       ENT    Vertigo R42       Musculoskeletal and Injuries    Chronic low back pain M54.50, G89.29     Other Visit Diagnoses         Codes    Sciatica of right side     M54.31            Subjective    Darline Gonzalez denies any falls or complications since last session. Darline Gonzalez reports significant reduction in dizziness after last session up until last night: onset of L ear fullness again with sinus headache.     Compliance with HEP: not applicable due to BPPV treatment.    Pain:  0/10    Precautions:  Fall Risk: Moderate   \"Pronounces name Sure-ree\"  **+osteopenia in L femoral neck, lumbar spine normal on recent DEXA scan**   **+pacemaker**  **+latex allergy**  Denies: DM, blood thinner medication use, epilepsy/seizures, or other known neuro/pulmonary problems   +pacemaker  H/o breast CA - in remission since 2012; breast lumpectomy in 2011 plus s/p chemo and radiation  +HTN  H/o vertigo in 1980's   +neuropathy in B feet after chemo and radiation  H/o HA's and migraines   Previous surgeries include: none orthopedically related       Objective   Positional Testing: without goggles  [place]-Hallpike Right = positive torsional   [place]-Hallpike Left = positive torsional   Horizontal Roll Test Right = NP  Horizontal Roll Test Left = NP    Treatments:  Therapeutic Activity: 20 minutes    Time spent on positional testing for multiple canals and assessing symptoms.  Time spent on Answering Darline Gonzalez's questions in full.   Reviewed pathophysiology of BPPV using model, rationale for CRP.  Risk factors associated with INC prevalence of BPPV, possibly that it can return.  Reviewed post CRP precautions: Darline ZENDEJAS Lisa to avoid looking up/down, quick head turns during ADLs for a few hours after repositioning, to avoid lying down until bed time.  Darline advised that she may feel off " balance after the reposition and to use safety measures at home/device with walking or resting as needed to decrease fall risk, Darline Gonzalez verbalized understanding.  Darline educated on Tumark[place] phenomenon, balance system and components  Darline Gonzalez Educated on some strategies for management of non-resolving BPPV including:   option of holding each position longer vs going through the positions quicker in attempts to keep the otoconia flowing  recommendation of strict adherence with post repositioning guidelines and avoiding excessive head motions  multiple canal management of most symptomatic canal that day vs bilateral    Canalith Reposition:    7 minutes  Li Maneuver 1x includes time between CRP and guarding pt for safety   Patient keeps their head in neutral and goes into side-lying on their affected side x1 minute. Then, rapidly bring them to the unaffected side (1 second) and keep them in side-lying x4 minutes before sitting up with the head still in neutral to finish the maneuver.  Use of rolled towel behind c-spine with good relief reported      Assigned HEP: [facility] Access Code: [email or web address]      EDUCATION: see above     Time Calculation  Start Time: 1605  Stop Time: 1632  Time Calculation (min): 27 min     PT Therapeutic Procedures Time Entry  Therapeutic Activity Time Entry: 20  PT Modalities Time Entry  Canalith Repositioning Time Entry: 7

## 2024-08-15 NOTE — PROGRESS NOTES
PHYSICAL THERAPY TREATMENT    Patient Name: Darline Gonzalez  MRN: [id number]  Today's Date: 8/19/2024       Insurance:  Visit number: Visit count could not be calculated. Make sure you are using a visit which is associated with an episode.  Insurance Type: Payor: MEDICARE / Plan: MEDICARE PART A AND B / Product Type: *No Product type* /   Certification Period Start Date: 05/30/24 for BPPV   Certification Period End Date: 08/28/24 for BPPV  Certification Period Start Date: 06/04/24 for LBP  Certification Period End Date: 09/02/24 for LBP   Referred by: Jo Sanchez MD    ** LBP added to PT POC on 6/4/2025**    Assessment:  Darline Gonzalez presents to PT treatment session with ongoing dizziness. Positive [place] Hallpike test bilaterally  with observed nystagmus of short duration (<20 seconds) consistent with bilateral posterior canalithiasis. Nystagmus and subjective report of dizziness with equal intensity, therapist selected to continue treating L side. Performed subsequent repositioning, Li Maneuver 1x for the left and will monitor effectiveness.    Darline's response to tx: No increased dizziness, moderate nausea post CRPM. Unable to tolerate further testing and maneuvers. Required seated rest break in lobby prior to exiting clinic.     Darline's Progress towards goals: addressed positional dizziness    Justification for skilled care: Darline Gonzalez continues to have intermittent dizziness limiting participation in ADLs, IADLs, and meaningful activities. Further skilled therapy services are warranted to decrease fall risk and to address the remaining impairments in order to progress towards functional goals for the Darline  to fully participate in an active lifestyle.     Darline left session with all questions answered. No evidence of gait instability observed exiting clinic.       Plan:   BPPV positional testing and subsequent CRPM PRN  Resume LB when able: assess response to prone lying/standing extension;  "core/prox hip strengthening; pelvic assessment if needed      Therapy Diagnosis:   Problem List Items Addressed This Visit    None        Subjective    Darline Gonzalez denies any falls or complications since last session. Darline Gonzalez reports significant reduction in dizziness after last session up until last night: onset of L ear fullness again with sinus headache.     Compliance with HEP: not applicable due to BPPV treatment.    Pain:  0/10    Precautions:  Fall Risk: Moderate   \"Pronounces name Sure-ree\"  **+osteopenia in L femoral neck, lumbar spine normal on recent DEXA scan**   **+pacemaker**  **+latex allergy**  Denies: DM, blood thinner medication use, epilepsy/seizures, or other known neuro/pulmonary problems   +pacemaker  H/o breast CA - in remission since 2012; breast lumpectomy in 2011 plus s/p chemo and radiation  +HTN  H/o vertigo in 1980's   +neuropathy in B feet after chemo and radiation  H/o HA's and migraines   Previous surgeries include: none orthopedically related       Objective   Positional Testing: without goggles  [place]-Hallpike Right = positive torsional   [place]-Hallpike Left = positive torsional   Horizontal Roll Test Right = NP  Horizontal Roll Test Left = NP    Treatments:  Therapeutic Activity:   minutes    Time spent on positional testing for multiple canals and assessing symptoms.  Time spent on Answering Darline Gonzalez's questions in full.   Reviewed pathophysiology of BPPV using model, rationale for CRP.  Risk factors associated with INC prevalence of BPPV, possibly that it can return.  Reviewed post CRP precautions: Darline Gonzalez to avoid looking up/down, quick head turns during ADLs for a few hours after repositioning, to avoid lying down until bed time.  Darline advised that she may feel off balance after the reposition and to use safety measures at home/device with walking or resting as needed to decrease fall risk, Darline Gonzalez verbalized understanding.  Darline educated " on Tumarkin phenomenon, balance system and components  Darline Gonzalez Educated on some strategies for management of non-resolving BPPV including:   option of holding each position longer vs going through the positions quicker in attempts to keep the otoconia flowing  recommendation of strict adherence with post repositioning guidelines and avoiding excessive head motions  multiple canal management of most symptomatic canal that day vs bilateral    Canalith Reposition:      minutes  Li Maneuver 1x includes time between CRP and guarding pt for safety   Patient keeps their head in neutral and goes into side-lying on their affected side x1 minute. Then, rapidly bring them to the unaffected side (1 second) and keep them in side-lying x4 minutes before sitting up with the head still in neutral to finish the maneuver.  Use of rolled towel behind c-spine with good relief reported      Assigned HEP: [facility] Access Code: [email or web address]      EDUCATION: see above

## 2024-08-19 ENCOUNTER — APPOINTMENT (OUTPATIENT)
Dept: PHYSICAL THERAPY | Facility: CLINIC | Age: 74
End: 2024-08-19
Payer: MEDICARE

## 2024-08-19 ENCOUNTER — DOCUMENTATION (OUTPATIENT)
Dept: PHYSICAL THERAPY | Facility: CLINIC | Age: 74
End: 2024-08-19
Payer: MEDICARE

## 2024-08-19 NOTE — PROGRESS NOTES
Physical Therapy                 Therapy Communication Note    Patient Name: Darline Gonzalez  MRN: [id number]  Today's Date: 8/19/2024     Discipline: Physical Therapy    Missed Time: Cancel    Comment: Therapist called pt in regards to missed appointment. Pt stated she left a voicemail cx due to being sick (message not relayed to therapist at time of appointment). Pt reminded of next appointment

## 2024-08-21 DIAGNOSIS — F41.1 GENERALIZED ANXIETY DISORDER: ICD-10-CM

## 2024-08-21 DIAGNOSIS — E78.00 HYPERCHOLESTEROLEMIA: ICD-10-CM

## 2024-08-21 RX ORDER — ESCITALOPRAM OXALATE 10 MG/1
10 TABLET ORAL DAILY
Qty: 90 TABLET | Refills: 0 | Status: SHIPPED | OUTPATIENT
Start: 2024-08-21 | End: 2024-11-19

## 2024-08-21 RX ORDER — ROSUVASTATIN CALCIUM 20 MG/1
20 TABLET, COATED ORAL
Qty: 90 TABLET | Refills: 1 | Status: SHIPPED | OUTPATIENT
Start: 2024-08-21

## 2024-08-21 NOTE — PROGRESS NOTES
"PHYSICAL THERAPY TREATMENT    Patient Name: Darline Gonzalez  MRN: [id number]  Today's Date: 8/27/2024  Time Calculation  Start Time: 1318  Stop Time: 1339  Time Calculation (min): 21 min    Insurance:  Visit number: 8  Insurance Type: Payor: MEDICARE / Plan: MEDICARE PART A AND B / Product Type: *No Product type* /   Certification Period Start Date: 05/30/24 for BPPV >> Re-Certification Period Start Date 8/27/24  Certification Period End Date: 08/28/24 for BPPV >> Re-Certification Period End Date 11/25/24  Certification Period Start Date: 06/04/24 for LBP  Certification Period End Date: 09/02/24 for LBP   Referred by: Jo Sanchez MD    ** LBP added to PT POC on 6/4/2025**    Assessment:  Darline Gonzalez presents to PT treatment session with ongoing dizziness. Positive [place] Hallpike test bilaterally with observed nystagmus of short duration (< 30 seconds) consistent with bilateral posterior canalithiasis. More beats of nystagmus observed L vs R side with subjective report of equal intensity of dizziness between sides. Therapist selected to treat L side due to nystagmus. Performed subsequent repositioning, Modified [place] 1x for the left with \"Dari Noelle\" shake and prolonged holds due to non-resolving BPPV and will monitor effectiveness.    Darline's response to tx: No increased dizziness, mild nausea post CRPM. Unable to tolerate further testing and maneuvers. Required seated rest break in lobby prior to exiting clinic.     Darline's Progress towards goals: addressed positional dizziness    Justification for skilled care: Darline Gonzalez continues to have intermittent dizziness limiting participation in ADLs, IADLs, and meaningful activities. Further skilled therapy services are warranted to decrease fall risk and to address the remaining impairments in order to progress towards functional goals for the Darline  to fully participate in an active lifestyle.     Darline left session with all questions answered. No " "evidence of gait instability observed exiting clinic.       Plan:   BPPV positional testing and subsequent CRPM PRN  Resume LB when able: assess response to prone lying/standing extension; core/prox hip strengthening; pelvic assessment if needed      Therapy Diagnosis:   Problem List Items Addressed This Visit             ICD-10-CM       ENT    Vertigo R42       Musculoskeletal and Injuries    Chronic low back pain M54.50, G89.29     Other Visit Diagnoses         Codes    Sciatica of right side     M54.31            Subjective    Darline Gonzalez denies any falls or complications since last session. Darline Gonzalez reports dizziness is improving: none with turning/rolling towards L side in bed.     Compliance with HEP: not applicable due to BPPV treatment.    Pain:  0/10    Precautions:  Fall Risk: Moderate   \"Pronounces name Sure-ree\"  **+osteopenia in L femoral neck, lumbar spine normal on recent DEXA scan**   **+pacemaker**  **+latex allergy**  Denies: DM, blood thinner medication use, epilepsy/seizures, or other known neuro/pulmonary problems   +pacemaker  H/o breast CA - in remission since 2012; breast lumpectomy in 2011 plus s/p chemo and radiation  +HTN  H/o vertigo in 1980's   +neuropathy in B feet after chemo and radiation  H/o HA's and migraines   Previous surgeries include: none orthopedically related       Objective   Positional Testing: without goggles  [place]-Hallpike Right = positive torsional   [place]-Hallpike Left = positive torsional mildly worse compared to R side  Horizontal Roll Test Right = NP  Horizontal Roll Test Left = NP      Treatments:  Therapeutic Activity: 15 minutes    Time spent on positional testing for multiple canals and assessing symptoms.  Time spent on Answering Darline Gonzalez's questions in full.   Reviewed pathophysiology of BPPV using model, rationale for CRP.  Risk factors associated with INC prevalence of BPPV, possibly that it can return.  Reviewed post CRP precautions: " "Darline Gonzalez to avoid looking up/down, quick head turns during ADLs for a few hours after repositioning, to avoid lying down until bed time.  Darline advised that she may feel off balance after the reposition and to use safety measures at home/device with walking or resting as needed to decrease fall risk, Darline Gonzalez verbalized understanding.  Darline Gonzalez Educated on some strategies for management of non-resolving BPPV including:   option of holding each position longer vs going through the positions quicker in attempts to keep the otoconia flowing  recommendation of strict adherence with post repositioning guidelines and avoiding excessive head motions  multiple canal management of most symptomatic canal that day vs bilateral      Canalith Reposition:   6 minutes  Modified Epley 1x with \"Dari Noelle\" shake towards L side: includes time between CRP and guarding pt for safety      Assigned HEP: [facility] Access Code: [email or web address]      EDUCATION: see above     Time Calculation  Start Time: 1318  Stop Time: 1339  Time Calculation (min): 21 min     PT Therapeutic Procedures Time Entry  Therapeutic Activity Time Entry: 15  PT Modalities Time Entry  Canalith Repositioning Time Entry: 6                  "

## 2024-08-27 ENCOUNTER — TREATMENT (OUTPATIENT)
Dept: PHYSICAL THERAPY | Facility: CLINIC | Age: 74
End: 2024-08-27
Payer: MEDICARE

## 2024-08-27 DIAGNOSIS — R42 VERTIGO: ICD-10-CM

## 2024-08-27 DIAGNOSIS — M54.50 CHRONIC LOW BACK PAIN, UNSPECIFIED BACK PAIN LATERALITY, UNSPECIFIED WHETHER SCIATICA PRESENT: ICD-10-CM

## 2024-08-27 DIAGNOSIS — M54.31 SCIATICA OF RIGHT SIDE: ICD-10-CM

## 2024-08-27 DIAGNOSIS — G89.29 CHRONIC LOW BACK PAIN, UNSPECIFIED BACK PAIN LATERALITY, UNSPECIFIED WHETHER SCIATICA PRESENT: ICD-10-CM

## 2024-08-27 PROCEDURE — 95992 CANALITH REPOSITIONING PROC: CPT | Mod: GP

## 2024-08-27 PROCEDURE — 97530 THERAPEUTIC ACTIVITIES: CPT | Mod: GP

## 2024-09-03 ENCOUNTER — APPOINTMENT (OUTPATIENT)
Dept: PHYSICAL THERAPY | Facility: CLINIC | Age: 74
End: 2024-09-03
Payer: MEDICARE

## 2024-09-03 ENCOUNTER — HOSPITAL ENCOUNTER (OUTPATIENT)
Dept: CARDIOLOGY | Facility: CLINIC | Age: 74
Discharge: HOME | End: 2024-09-03
Payer: MEDICARE

## 2024-09-03 DIAGNOSIS — Z95.0 CARDIAC PACEMAKER IN SITU: ICD-10-CM

## 2024-09-03 DIAGNOSIS — I49.5 SICK SINUS SYNDROME DUE TO SA NODE DYSFUNCTION (MULTI): ICD-10-CM

## 2024-09-03 PROCEDURE — 93296 REM INTERROG EVL PM/IDS: CPT

## 2024-09-04 ENCOUNTER — APPOINTMENT (OUTPATIENT)
Dept: CARDIOLOGY | Facility: CLINIC | Age: 74
End: 2024-09-04
Payer: MEDICARE

## 2024-09-04 VITALS
SYSTOLIC BLOOD PRESSURE: 142 MMHG | BODY MASS INDEX: 30.46 KG/M2 | OXYGEN SATURATION: 93 % | HEART RATE: 73 BPM | HEIGHT: 68 IN | WEIGHT: 201 LBS | DIASTOLIC BLOOD PRESSURE: 80 MMHG

## 2024-09-04 DIAGNOSIS — I49.5 SICK SINUS SYNDROME DUE TO SA NODE DYSFUNCTION (MULTI): Chronic | ICD-10-CM

## 2024-09-04 DIAGNOSIS — I25.10 CORONARY ARTERY DISEASE INVOLVING NATIVE CORONARY ARTERY OF NATIVE HEART WITHOUT ANGINA PECTORIS: Chronic | ICD-10-CM

## 2024-09-04 DIAGNOSIS — I10 BENIGN ESSENTIAL HYPERTENSION: Primary | ICD-10-CM

## 2024-09-04 DIAGNOSIS — I49.9 SUPRAVENTRICULAR ARRHYTHMIA: ICD-10-CM

## 2024-09-04 DIAGNOSIS — R06.09 DYSPNEA ON EXERTION: ICD-10-CM

## 2024-09-04 DIAGNOSIS — E78.00 HYPERCHOLESTEROLEMIA: Chronic | ICD-10-CM

## 2024-09-04 PROBLEM — G89.29 CHRONIC LOW BACK PAIN: Status: RESOLVED | Noted: 2024-06-04 | Resolved: 2024-09-04

## 2024-09-04 PROBLEM — I63.81 LACUNAR INFARCTION (MULTI): Chronic | Status: ACTIVE | Noted: 2023-02-07

## 2024-09-04 PROBLEM — M54.50 CHRONIC LOW BACK PAIN: Status: RESOLVED | Noted: 2024-06-04 | Resolved: 2024-09-04

## 2024-09-04 PROBLEM — N18.31 STAGE 3A CHRONIC KIDNEY DISEASE (MULTI): Chronic | Status: ACTIVE | Noted: 2024-04-25

## 2024-09-04 PROCEDURE — 3079F DIAST BP 80-89 MM HG: CPT | Performed by: INTERNAL MEDICINE

## 2024-09-04 PROCEDURE — 1159F MED LIST DOCD IN RCRD: CPT | Performed by: INTERNAL MEDICINE

## 2024-09-04 PROCEDURE — 99213 OFFICE O/P EST LOW 20 MIN: CPT | Performed by: INTERNAL MEDICINE

## 2024-09-04 PROCEDURE — 1160F RVW MEDS BY RX/DR IN RCRD: CPT | Performed by: INTERNAL MEDICINE

## 2024-09-04 PROCEDURE — 1123F ACP DISCUSS/DSCN MKR DOCD: CPT | Performed by: INTERNAL MEDICINE

## 2024-09-04 PROCEDURE — 3077F SYST BP >= 140 MM HG: CPT | Performed by: INTERNAL MEDICINE

## 2024-09-04 PROCEDURE — 1036F TOBACCO NON-USER: CPT | Performed by: INTERNAL MEDICINE

## 2024-09-04 NOTE — PATIENT INSTRUCTIONS
1. Sinus bradycardia.  Status post [employer] pacemaker insertion June 2023.  The generator had to be changed as it was nonfunctional in August 2023.  She has been okay since then.      2. CAD. BMS RCA 2003. Stress test 2021 reduced sensitivity because of chronotropic incompetence. Continue aspirin continue Zetia lisinopril and rosuvastatin she does have intrascapular pain.  Her last stress test he was only able to get to 59% of her maximal predicted heart rate.  This is in 2021.  Regadenoson nuclear stress test 4/9/2024 normal perfusion ejection fraction 58%     3. Dizziness resolved this was due to orthostasis. Since discontinuing her lisinopril and atenolol she has been better.     4. Hypertension excellent control. Despite off of medication.     5. Hyperlipidemia.  2/19/2024 LDL 81 HDL 38 triglycerides 179    6.  CVA with a left pontine stroke and lacunar infarcts noted.  She has had a meningioma.     RTC 1 year

## 2024-09-04 NOTE — PROGRESS NOTES
Referred by No ref. provider found    HPI    Feeling well. Dx with vertigo. Getting the Epley exercises. Feeling better. Diaphoresis resolved.   Past Medical History:  Problem List Items Addressed This Visit    None     Past Medical History:   Diagnosis Date    Anxiety     Aortic stenosis 02/07/2023    mild    Arrhythmia     Sick sinus syndrome due to SA node dysfunction    Breast cancer of lower-outer quadrant of left female breast (Multi) 10/24/2015    CAD (coronary artery disease) 02/07/2023    - s/p PTCA/stent 2003 with Dr. Calhoun/ [place]; recent CT cardiac scoring 2-5-18 : coronary calcium score = 906.7      Dr. Chavarria notation 5/3/2023: BMS to RCA 2003       Coronary artery disease     cards: Nancy Chavarria,Aortic stenosis    Depression     Deviated nasal septum     Deviated septum    Dry mouth, unspecified     Dry mouth    Dysfunctional uterine bleeding     Elevated C-reactive protein (CRP)     CRP elevated    Fatty (change of) liver, not elsewhere classified     Fatty liver    Fibroid     Ob/Gyn; Dina Grimes    GERD (gastroesophageal reflux disease)     Heart disease     Hemorrhage of anus and rectum     Rectal bleed    Hypercholesterolemia 02/07/2023    Hyperlipidemia     Hypertension     Incisional hernia     Localized edema     Bilateral edema of lower extremity    Low back pain, unspecified 10/23/2016    Acute low back pain    Meningioma (Multi) 02/07/2023    TRINA on CPAP 02/07/2023    Other abnormal glucose     Elevated glucose    Other conditions influencing health status 07/12/2019    History of cough    Other specified abnormal immunological findings in serum     Positive KURT (antinuclear antibody)    Personal history of malignant neoplasm of breast 06/17/2022    History of malignant neoplasm of breast    Personal history of other benign neoplasm     History of uterine leiomyoma    Personal history of other diseases of the musculoskeletal system and connective tissue     History of necrotizing fasciitis     Personal history of other diseases of the musculoskeletal system and connective tissue     History of temporomandibular joint disorder    Personal history of other diseases of the nervous system and sense organs     History of carpal tunnel syndrome    Restless leg syndrome     Sick sinus syndrome due to SA node dysfunction (Multi) 06/27/2023    Sleep apnea     Uses CPAP    Syncope     hx of syncopal episodes and near syncopal episodes.      Past Surgical History:  She has a past surgical history that includes Dilation and curettage of uterus (02/25/2014); Other surgical history (02/25/2014); Other surgical history (02/25/2014); Breast lumpectomy (02/25/2014); Other surgical history (02/25/2014); MR angio head wo IV contrast (06/29/2020); Other surgical history (06/04/2018); Breast surgery (09/10/2018); Insert / replace / remove pacemaker (06/09/2023); Colonoscopy; Endoscopic insertion peritoneal catheter port; and Cholecystectomy.      Social History:  She reports that she has never smoked. She has never used smokeless tobacco. She reports that she does not currently use alcohol. She reports that she does not use drugs.    Family History:  Family History   Problem Relation Name Age of Onset    Hypertension Mother      Diabetes type II Mother      Breast cancer Mother      Cervical cancer Maternal Grandmother      Breast cancer Mother's Sister       Allergies:  Sulfa (sulfonamide antibiotics) and Latex    Outpatient Medications:  Current Outpatient Medications   Medication Instructions    acetaminophen (TYLENOL) 650 mg, oral, Every 6 hours PRN    aspirin 81 mg, oral, Daily RT    cholecalciferol (Vitamin D-3) 50 mcg (2,000 unit) capsule 1 capsule, oral, Daily    cyanocobalamin (Vitamin B-12) 1,000 mcg tablet 1 tablet, oral, Daily    escitalopram (LEXAPRO) 10 mg, oral, Daily    lisinopril 10 mg, oral, Daily RT    pantoprazole (PROTONIX) 40 mg, oral, Daily    rosuvastatin (CRESTOR) 20 mg, oral, Daily RT     Last  Recorded Vitals:  There were no vitals filed for this visit.    Physical Exam  Patient is alert and oriented x3.  HEENT is unremarkable mucous members are moist  Neck no JVP no bruits upstrokes are full no thyromegaly  Lungs are clear bilaterally.  No wheezing crackles or rales  Heart regular rhythm normal S1-S2 there is no S3 no murmurs are heard.  Abdomen is soft bs are positive nontender nondistended no organomegaly no pulsatile masses  Extremities have no edema.  Distal pulses present palpable.  Neuro is grossly nonfocal  Skin has no rashes     Last Labs:  CBC -  Lab Results   Component Value Date    WBC 7.5 02/19/2024    HGB 15.0 02/19/2024    HCT 47.0 (H) 02/19/2024     02/19/2024     02/19/2024     CMP -  Lab Results   Component Value Date    CALCIUM 10.3 07/18/2024    PROT 7.4 07/18/2024    ALBUMIN 4.6 07/18/2024    AST 29 07/18/2024    ALT 30 07/18/2024    ALKPHOS 72 07/18/2024    BILITOT 0.8 07/18/2024     LIPID PANEL -   Lab Results   Component Value Date    CHOL 155 02/19/2024    HDL 38.0 02/19/2024    CHHDL 4.1 02/19/2024    VLDL 36 02/19/2024    TRIG 179 (H) 02/19/2024    NHDL 117 02/19/2024     RENAL FUNCTION PANEL -   Lab Results   Component Value Date    K 4.5 07/18/2024     Lab Results   Component Value Date    BNP 66 10/29/2019    HGBA1C 5.9 (H) 02/19/2024     Procedure    AST 4/9/2024 normal.  Ejection fraction 58%.  Infusion related facial pain and diaphoresis.    Pacemaker generator revision 8/24/2023    Pacemaker 6/29/2023 [employer]    HOLTER [04/03/2023]: SR, -49. Sinus pauses up to 3.6 seconds in duration. No ep/of A-fib / PSVT / high-grade AV block. No VT.      HOLTER [05/16/2022]: SR, -68. No ep/of A-fib / PSVT / high-grade AV block. No VT.      ECHO [05/16/2022]: EF 55-60%. SD = impaired relaxation pattern. Mod cLVH.      HOLTER [04/07/2022]: SR, 34-79-57. Sinus pauses up to 3.5 sec present. No ep/of A-fib / PSVT / high-grade AV block. No VT.     CT  [01/07/2022]: Stable 0.7 cm lingular pulm nodule and 0.4 cm RLL nodule. Stable reticulonodular densities w/in TAMANNA anteriorly, likely relates to postop/post-radiation changes. Stable postsurgical changes of the left breast w/o ev/of local dz recurrence. Small hiatal hernia. Severe coronary artery calcifications. 2.5 cm fluid attenuating hepatic cyst.      EX NST [05/05/2021]: 3 min 45 sec (5.40 METs) . . . Normal perfusion imaging. However, diagnostic sensitivity reduced as patient is only able to achieve 59% max pred HR. Well-maint LVF, EF 53%.     CAROTID [06/10/2020]: Less than 50% stenosis MANPREET / LICA      HOME SLEEP STUDY [04/30/2020] = SEVERE sleep apnea      ECHO [2018]: EF 50-55%, mild AS     CATH [2003]: subtotal RCA s/p BMS          Assessment/Plan   1. Sinus bradycardia.  Status post [employer] pacemaker insertion June 2023.  The generator had to be changed as it was nonfunctional in August 2023.  She has been okay since then.      2. CAD. BMS RCA 2003. Stress test 2021 reduced sensitivity because of chronotropic incompetence. Continue aspirin continue Zetia lisinopril and rosuvastatin she does have intrascapular pain.  Her last stress test he was only able to get to 59% of her maximal predicted heart rate.  This is in 2021.  Regadenoson nuclear stress test 4/9/2024 normal perfusion ejection fraction 58%     3. Dizziness resolved this was due to orthostasis. Since discontinuing her lisinopril and atenolol she has been better.     4. Hypertension excellent control. Despite off of medication.     5. Hyperlipidemia.  2/19/2024 LDL 81 HDL 38 triglycerides 179    6.  CVA with a left pontine stroke and lacunar infarcts noted.  She has had a meningioma.     RTC 1 year  Nancy Chavarria MD     Instructions and follow up

## 2024-09-16 ENCOUNTER — APPOINTMENT (OUTPATIENT)
Dept: PHYSICAL THERAPY | Facility: CLINIC | Age: 74
End: 2024-09-16
Payer: MEDICARE

## 2024-09-17 ENCOUNTER — DOCUMENTATION (OUTPATIENT)
Dept: PHYSICAL THERAPY | Facility: CLINIC | Age: 74
End: 2024-09-17
Payer: MEDICARE

## 2024-09-17 NOTE — PROGRESS NOTES
Physical Therapy      Patient Name: Darline Gonzalez  MRN: [id number]  Today's Date: 9/17/2024     Discipline: Physical Therapy    Comment: Pt cx last scheduled appointment via automated services. If no contact is made with clinic by 9/24/24, pt will be discharged from PT services

## 2024-09-30 NOTE — PROGRESS NOTES
PHYSICAL THERAPY TREATMENT    Patient Name: Darline Gonzalez  MRN: [id number]  Today's Date: 10/3/2024  Time Calculation  Start Time: 1318  Stop Time: 1400  Time Calculation (min): 42 min    Insurance:  Visit number: 9  Insurance Type: Payor: MEDICARE / Plan: MEDICARE PART A AND B / Product Type: *No Product type* /   Certification Period Start Date: 05/30/24 for BPPV >> Re-Certification Period Start Date 8/27/24  Certification Period End Date: 08/28/24 for BPPV >> Re-Certification Period End Date 11/25/24  Certification Period Start Date: 06/04/24 for LBP  Certification Period End Date: 09/02/24 for LBP *will need to re-certify if interested in continuing*  Referred by: Jo Sanchez MD    ** LBP added to PT POC on 6/4/2025**    Assessment:    Darline Gonzalez returns to vestibular therapy after scheduling conflicts- last seen on 8/27. Re-assessment performed today after Darline Gonzalez has attended  for dizziness (s/s consistent with BPPV) and low back pain. Pt has selected to focus only on vestibular impairments at this time. Treatments have primarily consisted of: TherEx, TherAct, and Canalith Repositioning. Darline Gonzalez has demonstrated improvements in reduced intensity of nystagmus and subjective dizziness since initial evaluation. She reports 90% improvement in dizziness since starting PT. Impairments in dizziness consistent with BPPV remain at this time limiting ability to sleep. Good progress made towards established goals. Skilled physical therapy services continue to be appropriate and beneficial at recommended frequency of 1-2x week for 6 weeks to advance functional status and attain therapy-related goals.     Darline Gonzalez presents to PT treatment session with ongoing dizziness. Positive Yale Hallpike test bilaterally with observed nystagmus of short duration (< 60 seconds) consistent with bilateral posterior canalithiasis. Greater intensity towards L side- addressed this visit. Performed  subsequent repositioning, Modified Epley 1x and Li Maneuver 2x will monitor effectiveness.    Darline's response to tx: No increased dizziness. Reduced dizziness with sitting up following final maneuver. Mild nausea post CRPM. Improved tolerance to CRPMs this session. Required seated rest break in lobby prior to exiting clinic.     Darline's Progress towards goals: addressed positional dizziness    Justification for skilled care: Darline Gonzalez continues to have intermittent dizziness limiting participation in ADLs, IADLs, and meaningful activities. Further skilled therapy services are warranted to decrease fall risk and to address the remaining impairments in order to progress towards functional goals for the aDrline  to fully participate in an active lifestyle.     Darline left session with all questions answered. No evidence of gait instability observed exiting clinic.       Plan:   BPPV positional testing and subsequent CRPM PRN  Resume LB when able: assess response to prone lying/standing extension; core/prox hip strengthening; pelvic assessment if needed      Therapy Diagnosis:   Problem List Items Addressed This Visit             ICD-10-CM       ENT    Vertigo - Primary R42    Relevant Orders    Follow Up In Physical Therapy     Other Visit Diagnoses         Codes    Sciatica of right side     M54.31    Relevant Orders    Follow Up In Physical Therapy    Chronic low back pain, unspecified back pain laterality, unspecified whether sciatica present     M54.50, G89.29    Relevant Orders    Follow Up In Physical Therapy              Subjective    Darline Gonzalez denies any falls or complications since last session. Returns to clinic after not being seen since 8/27. Reports dizziness remains improved, however, has been experiencing increased L ear pain. Continues to feel like room wants to spin with laying down. Would like to continue addressing vestibular vs low back symptoms.     Compliance with HEP: not  "applicable due to BPPV treatment.    Pain:  1-2/10  L ear    Precautions:  Fall Risk: Moderate   \"Pronounces name Sure-ree\"  **+osteopenia in L femoral neck, lumbar spine normal on recent DEXA scan**   **+pacemaker**  **+latex allergy**  Denies: DM, blood thinner medication use, epilepsy/seizures, or other known neuro/pulmonary problems   +pacemaker  H/o breast CA - in remission since 2012; breast lumpectomy in 2011 plus s/p chemo and radiation  +HTN  H/o vertigo in 1980's   +neuropathy in B feet after chemo and radiation  H/o HA's and migraines   Previous surgeries include: none orthopedically related       Objective   Positional Testing: without goggles  Serge-Hallpike Right = positive torsional upbeating  Serge-Hallpike Left = positive torsional upbeating *greater intensity vs R side  Horizontal Roll Test Right = negative   Horizontal Roll Test Left = negative        Goals:  Darline Gonzalez will test negative for positional vertigo.  10/3/24: PROGRESSING- see objective     Darline Gonzalez will report no complaint of positional imbalance or vertigo for one week with daily activities.  10/3/24: PROGRESSING- only feels spinning intermittently with laying down     Darline Gonzalez will decrease DHI by >/= 18 points (minimally clinically important difference) or </=34 points (16-34 Points is a mild handicap) in order to improve safety at home and decrease falls risk. Baseline  10/3/24: NOT ASSESSED     Darline Gonzalez will complete sit <> stand transfers using BUE, equal weight bearing on LEs, and no major LOB at completion of transfer during 3/3 trials in order to enhance functional mobility and safety.  10/3/24: GOAL MET     Darline Gonzalez will ambulate with IND on even surfaces for community distances without imbalance or major deviation to safely return to [facility] and enhance access to the community.  10/3/24: GOAL MET     THE FOLLOWING LOW BACK GOALS NOT ASSESSED THIS VISIT- LOW HAS NOT BEEN ADDRESSED SINCE INITIAL " ASSESSMENT DUE TO PATIENT REQUESTING TO FOCUS ON VESTIBULAR IMPAIRMENTS    Darline Gonzalez will demonstrate independence and report compliance with HEP to facilitate independent rehab program upon discharge.    Darline Gonzalez will report driving with 75% less pain to allow for return to work and ADLs without limitation.      Darline Gonzalez will report 75% improvement in sleep status in order to attain adequate rest.     Darline Gonzalezwill improve Oswestry (POOJA) score by at least 5 points  in order to reflect improvement in ADL's/pain reduction. Baseline 5/50, (POOJA score of </=10/50 (</=20%) represents minimal to no disability)     Darline Gonzalez to increase core/B LE strength in deficient muscles by >/= 1/2 MMT grade to improve dynamic stability during household/occupational/recreational tasks.     Darline Gonzalez will report 75% reduction in pain during ADLs to improve tolerance to household activities.     Darline Gonzalez will increase lumbar mobility to WFL/symmetrical without pain for unlimited ability to perform home household/occupational/recreational tasks.     Darline Gonzalez will demonstrate good posture with <2 cues for correction during 45 minute treatment session in order to enhance body mechanics with ADLs, functional mobility, and occupational duties.     Darline Gonzalez will be able to perform all aspects of bed mobility independently without pain or increase in time to complete.     Darline Gonzalez will demonstrate appropriate lifting technique with <2 cues for correction using golfer's lift to pick objects off the floor (at home, and light objects at work) and with moving/handling heavy packages while protecting integrity of low back to safely perform ADLs/return to work.     Darline Gonzalez will ambulate with IND on even surfaces without distance restriction, pain, major deviation or instability to improve participation in household/recreational/occupational duties.     Darline ZENDEJAS  Lisa will be able to ascend/descend > or equal to 8 steps with use of unilateral handrail reciprocally without pain/difficulty in order for patient to be able to ambulate safely on all levels of home and in the community.     Darline Gonzalez will demonstrate independence and report compliance with HEP to facilitate independent rehab program upon discharge.      Treatments:  Therapeutic Activity: 23 minutes    Time spent on positional testing for multiple canals and assessing symptoms.  Time spent on Answering Darline Gonzalez's questions in full.   Reviewed pathophysiology of BPPV using model, rationale for CRP.  Risk factors associated with INC prevalence of BPPV, possibly that it can return.  Reviewed post CRP precautions: Darline Gonzalez to avoid looking up/down, quick head turns during ADLs for a few hours after repositioning, to avoid lying down until bed time.  Darline advised that she may feel off balance after the reposition and to use safety measures at home/device with walking or resting as needed to decrease fall risk, Darline AASHISH Lisa verbalized understanding.  Darline A Lisa Educated on some strategies for management of non-resolving BPPV including:   option of holding each position longer vs going through the positions quicker in attempts to keep the otoconia flowing  recommendation of strict adherence with post repositioning guidelines and avoiding excessive head motions  multiple canal management of most symptomatic canal that day vs bilateral      Canalith Reposition:   19 minutes  Includes time between CRP and guarding pt for safety  Modified Epley towards L side  Li Maneuver 2X   Patient keeps their head in neutral and goes into side-lying on their affected side x1 minute. Then, rapidly bring them to the unaffected side (1 second) and keep them in side-lying x4 minutes before sitting up with the head still in neutral to finish the maneuver.      Assigned HEP: [employer] Access Code:  [email or web address]      EDUCATION: see above     Time Calculation  Start Time: 1318  Stop Time: 1400  Time Calculation (min): 42 min     PT Therapeutic Procedures Time Entry  Therapeutic Activity Time Entry: 23  PT Modalities Time Entry  Canalith Repositioning Time Entry: 19

## 2024-10-03 ENCOUNTER — TREATMENT (OUTPATIENT)
Dept: PHYSICAL THERAPY | Facility: CLINIC | Age: 74
End: 2024-10-03
Payer: MEDICARE

## 2024-10-03 DIAGNOSIS — M54.50 CHRONIC LOW BACK PAIN, UNSPECIFIED BACK PAIN LATERALITY, UNSPECIFIED WHETHER SCIATICA PRESENT: ICD-10-CM

## 2024-10-03 DIAGNOSIS — M54.31 SCIATICA OF RIGHT SIDE: ICD-10-CM

## 2024-10-03 DIAGNOSIS — G89.29 CHRONIC LOW BACK PAIN, UNSPECIFIED BACK PAIN LATERALITY, UNSPECIFIED WHETHER SCIATICA PRESENT: ICD-10-CM

## 2024-10-03 DIAGNOSIS — R42 VERTIGO: Primary | ICD-10-CM

## 2024-10-03 PROCEDURE — 95992 CANALITH REPOSITIONING PROC: CPT | Mod: GP

## 2024-10-03 PROCEDURE — 97530 THERAPEUTIC ACTIVITIES: CPT | Mod: GP

## 2024-10-10 ENCOUNTER — APPOINTMENT (OUTPATIENT)
Dept: PHYSICAL THERAPY | Facility: CLINIC | Age: 74
End: 2024-10-10
Payer: MEDICARE

## 2024-10-16 NOTE — PROGRESS NOTES
"PHYSICAL THERAPY TREATMENT    Patient Name: Darline Gonzalez  MRN: [id number]  Today's Date: 10/17/2024       Insurance:  Visit number: 10  Insurance Type: Payor: MEDICARE / Plan: MEDICARE PART A AND B / Product Type: *No Product type* /   Certification Period Start Date: 24 for BPPV >> Re-Certification Period Start Date 24  Certification Period End Date: 24 for BPPV >> Re-Certification Period End Date 24  Certification Period Start Date: 24 for LBP  Certification Period End Date: 24 for LBP *will need to re-certify if interested in continuing*  Referred by: Jo Sanchez MD    ** LBP added to PT POC on 2025**    Assessment:  Darline Gonzalez presents to PT treatment session with ongoing dizziness. {talsp:97584} loaded Redby Hallpike to the {talrl:73832} {talw:22155} observed {taln:03621::\"torsional\"} nystagmus of short duration (<60 seconds) consistent with {talrl:39822} {ta::\"posterior\"} canalithiasis. Performed subsequent repositioning, {talcm:18840::\"Modified Epley\"}, and will monitor effectiveness.      Darline's  response to tx:     Darline's Progress towards goals: addressed positional dizziness    Justification for skilled care: Darline Gonzalez continues to have intermittent dizziness limiting participation in ADLs, IADLs, and meaningful activities. Further skilled therapy services are warranted to decrease fall risk and to address the remaining impairments in order to progress towards functional goals for the Darline  to fully participate in an active lifestyle.     Darline left session with all questions answered and no increase in symptoms.           Plan:   BPPV positional testing and subsequent CRPM PRN  Resume LB when able: assess response to prone lying/standing extension; core/prox hip strengthening; pelvic assessment if needed      Therapy Diagnosis:   Problem List Items Addressed This Visit    None          Subjective    Darline Gonzalez denies any falls " "or complications since last session. Returns to clinic after not being seen since 8/27. Reports dizziness remains improved, however, has been experiencing increased L ear pain. Continues to feel like room wants to spin with laying down. Would like to continue addressing vestibular vs low back symptoms.     Compliance with HEP: not applicable due to BPPV treatment.    Pain:  1-2/10  L ear    Precautions:  Fall Risk: Moderate   \"Pronounces name Sure-ree\"  **+osteopenia in L femoral neck, lumbar spine normal on recent DEXA scan**   **+pacemaker**  **+latex allergy**  Denies: DM, blood thinner medication use, epilepsy/seizures, or other known neuro/pulmonary problems   +pacemaker  H/o breast CA - in remission since 2012; breast lumpectomy in 2011 plus s/p chemo and radiation  +HTN  H/o vertigo in 1980's   +neuropathy in B feet after chemo and radiation  H/o HA's and migraines   Previous surgeries include: none orthopedically related       Objective   Positional Testing: {talw:63057} goggles  Serge-Hallpike Right = {talpn:23238::\"negative\"}   [place]-Hallpike Left = {talpn:21500::\"negative\"}   Horizontal Roll Test Right = {talpn:85997::\"negative\"}   Horizontal Roll Test Left = {talpn:02938::\"negative\"}  Side Lying Test Right = {talpn:88662::\"negative\"}   Side Lying Test Left = {talpn:28508::\"negative\"}     Treatments  Therapeutic Activity:   minutes    Time spent on positional testing for multiple canals and assessing symptoms.  Time spent on Answering Darline Gonzalez's questions in full.   Reviewed pathophysiology of BPPV using model, rationale for CRP.  Risk factors associated with INC prevalence of BPPV, possibly that it can return.  Reviewed post CRP precautions: Darline Gonzalez to avoid looking up/down, quick head turns during ADLs for a few hours after repositioning, to avoid lying down until bed time.  Darline advised that she may feel off balance after the reposition and to use safety measures at home/device with " walking or resting as needed to decrease fall risk, Darline Gonzalez verbalized understanding.  Darline Gonzalez Educated on some strategies for management of non-resolving BPPV including:   option of holding each position longer vs going through the positions quicker in attempts to keep the otoconia flowing  recommendation of strict adherence with post repositioning guidelines and avoiding excessive head motions  multiple canal management of most symptomatic canal that day vs bilateral    Canalith Reposition:     minutes  Includes time between CRP and guarding pt for safety  Modified Epley towards L side  Li Maneuver 2X   Patient keeps their head in neutral and goes into side-lying on their affected side x1 minute. Then, rapidly bring them to the unaffected side (1 second) and keep them in side-lying x4 minutes before sitting up with the head still in neutral to finish the maneuver.      Assigned HEP: [employer] Access Code: [email or web address]      EDUCATION: see above

## 2024-10-17 ENCOUNTER — TREATMENT (OUTPATIENT)
Dept: PHYSICAL THERAPY | Facility: CLINIC | Age: 74
End: 2024-10-17
Payer: MEDICARE

## 2024-10-17 DIAGNOSIS — G89.29 CHRONIC LOW BACK PAIN, UNSPECIFIED BACK PAIN LATERALITY, UNSPECIFIED WHETHER SCIATICA PRESENT: ICD-10-CM

## 2024-10-17 DIAGNOSIS — M54.50 CHRONIC LOW BACK PAIN, UNSPECIFIED BACK PAIN LATERALITY, UNSPECIFIED WHETHER SCIATICA PRESENT: ICD-10-CM

## 2024-10-17 DIAGNOSIS — R42 VERTIGO: ICD-10-CM

## 2024-10-17 DIAGNOSIS — M54.31 SCIATICA OF RIGHT SIDE: ICD-10-CM

## 2024-10-17 PROCEDURE — 95992 CANALITH REPOSITIONING PROC: CPT | Mod: GP

## 2024-10-17 PROCEDURE — 97530 THERAPEUTIC ACTIVITIES: CPT | Mod: GP

## 2024-10-17 NOTE — PROGRESS NOTES
PHYSICAL THERAPY TREATMENT    Patient Name: Darline Gonzalez  MRN: [id number]  Today's Date: 10/17/2024  Time Calculation  Start Time: 1601  Stop Time: 1641  Time Calculation (min): 40 min    Insurance:  Visit number: 10  Insurance Type: Payor: MEDICARE / Plan: MEDICARE PART A AND B / Product Type: *No Product type* /   Certification Period Start Date: 05/30/24 for BPPV >> Re-Certification Period Start Date 8/27/24  Certification Period End Date: 08/28/24 for BPPV >> Re-Certification Period End Date 11/25/24  Certification Period Start Date: 06/04/24 for LBP  Certification Period End Date: 09/02/24 for LBP *will need to re-certify if interested in continuing*  Referred by: Jo Sanchez MD    ** LBP added to PT POC on 6/4/2025**    Assessment:  Darline Gonzalez presents to PT treatment session with ongoing dizziness. Positive loaded Serge Hallpike to the left with observed torsional nystagmus of long duration (>60 seconds) consistent with left posterior cupulothiasis. After Semont maneuver there continued to be a left torsional nystagmus of long duration (>60 seconds).  Performed semont for a second trial. After the second trial of the Semont maneuver Darline reported decreases in her dizziness. Darline did not want to attempt a 3rd. Monitor effectiveness.     Darline's  response to tx: No increased dizziness. Reduced dizziness with sitting up following final maneuver, mild nausea during and after CRPM.    Darline's Progress towards goals: addressed positional dizziness    Justification for skilled care: Darline Gonzalez continues to have intermittent dizziness limiting participation in ADLs, IADLs, and meaningful activities. Further skilled therapy services are warranted to decrease fall risk and to address the remaining impairments in order to progress towards functional goals for the Darline  to fully participate in an active lifestyle.     Darline left session with all questions answered.        Plan:   BPPV  "positional testing and subsequent CRPM PRN  Resume LB when able: assess response to prone lying/standing extension; core/prox hip strengthening; pelvic assessment if needed      Therapy Diagnosis:   Problem List Items Addressed This Visit             ICD-10-CM    Vertigo R42     Other Visit Diagnoses         Codes    Sciatica of right side     M54.31    Chronic low back pain, unspecified back pain laterality, unspecified whether sciatica present     M54.50, G89.29                Subjective    Darline Gonzalez denies any falls or complications since last session. Darline Gonzalez reports that she felt unsteadiness for the weekend after her therapy visit. Had increased dizziness which has improved as of today. Did have improvement in symptoms following her last visit.     Darline Gonzalez  n/a for  compliance with HEP.    Pain:  0/10      Precautions:  Fall Risk: Moderate   \"Pronounces name Sure-ree\"  **+osteopenia in L femoral neck, lumbar spine normal on recent DEXA scan**   **+pacemaker**  **+latex allergy**  Denies: DM, blood thinner medication use, epilepsy/seizures, or other known neuro/pulmonary problems   +pacemaker  H/o breast CA - in remission since 2012; breast lumpectomy in 2011 plus s/p chemo and radiation  +HTN  H/o vertigo in 1980's   +neuropathy in B feet after chemo and radiation  H/o HA's and migraines   Previous surgeries include: none orthopedically related       Objective     Positional Testing: without goggles  Serge-Hallpike Right = negative   Serge-Hallpike Left 2x= positive torsional lasting longer than 60 sec.  Horizontal Roll Test Right = negative   Horizontal Roll Test Left = positive torsional    Treatments  Therapeutic Activity: 23 minutes    Role of PT and PT POC.  Educated Darline regarding benefit and purpose of skilled vestibular PT services along with results of examination findings and how this correlates to their chief complaint.   Answered Darline Gonzalez's questions in " full.  Reviewed relevant anatomy using model  Reviewed pathophysiology of BPPV using model, rationale for CRP.  Time spent on positional testing for multiple canals and assessing symptoms.  Risk factors associated with INC prevalence of BPPV, possibly that it can return.  Darline was issued the ANPT post CRP handout and post CRP precautions reviewed: Darline AASHISH Gonzalez to avoid looking up/down, quick head turns during ADLs for a few hours after repositioning, to avoid lying down until bed time.  Darline advised that she may feel off balance after the reposition and to use safety measures at home/device with walking or resting as needed to decrease fall risk, Darline Gonzalez verbalized understanding.      Canalith Reposition:    17 minutes  2x Semont for left posterior cupulothiasis.  Includes time spent guarding Darline for safety between maneuvers.    Patient rotated their head away from the affected ear, and lay down towards the affected ear for 2 minutes, then quickly (<1 sec), and keep them in side-lying x4 minutes with the nose being pointed down towards the ground, then sit up.       Assigned HEP: [employer] Access Code: [email or web address]      EDUCATION: see above     Time Calculation  Start Time: 1601  Stop Time: 1641  Time Calculation (min): 40 min     PT Therapeutic Procedures Time Entry  Therapeutic Activity Time Entry: 23  PT Modalities Time Entry  Canalith Repositioning Time Entry: 17

## 2024-10-17 NOTE — PROGRESS NOTES
"PHYSICAL THERAPY TREATMENT    Patient Name: Darline Gonzalez  MRN: [id number]  Today's Date: 10/17/2024       Insurance:  Visit number: 10  Insurance Type: Payor: MEDICARE / Plan: MEDICARE PART A AND B / Product Type: *No Product type* /   Certification Period Start Date: 24 for BPPV >> Re-Certification Period Start Date 24  Certification Period End Date: 24 for BPPV >> Re-Certification Period End Date 24  Certification Period Start Date: 24 for LBP  Certification Period End Date: 24 for LBP *will need to re-certify if interested in continuing*  Referred by: Jo Sanchez MD    ** LBP added to PT POC on 2025**    Assessment:  Darline Gonzalez presents to PT treatment session with ongoing dizziness. {talsp:27809} loaded Dalton Hallpike to the {talrl:30144} {talw:03027} observed {taln:83594::\"torsional\"} nystagmus of short duration (<60 seconds) consistent with {talrl:26587} {ta::\"posterior\"} canalithiasis. Performed subsequent repositioning, {talcm:93958::\"Modified Epley\"}, and will monitor effectiveness.      Darline's  response to tx:     Darline's Progress towards goals: addressed positional dizziness    Justification for skilled care: Darline Gonzalez continues to have intermittent dizziness limiting participation in ADLs, IADLs, and meaningful activities. Further skilled therapy services are warranted to decrease fall risk and to address the remaining impairments in order to progress towards functional goals for the Darline  to fully participate in an active lifestyle.     Darline left session with all questions answered and no increase in symptoms.           Plan:   BPPV positional testing and subsequent CRPM PRN  Resume LB when able: assess response to prone lying/standing extension; core/prox hip strengthening; pelvic assessment if needed      Therapy Diagnosis:   Problem List Items Addressed This Visit             ICD-10-CM    Vertigo R42     Other Visit Diagnoses  " "       Codes    Sciatica of right side     M54.31    Chronic low back pain, unspecified back pain laterality, unspecified whether sciatica present     M54.50, G89.29                Subjective    Darline Gonzalez denies any falls or complications since last session. Returns to clinic after not being seen since 8/27. Reports dizziness remains improved, however, has been experiencing increased L ear pain. Continues to feel like room wants to spin with laying down. Would like to continue addressing vestibular vs low back symptoms.     Compliance with HEP: not applicable due to BPPV treatment.    Pain:  1-2/10  L ear    Precautions:  Fall Risk: Moderate   \"Pronounces name Sure-ree\"  **+osteopenia in L femoral neck, lumbar spine normal on recent DEXA scan**   **+pacemaker**  **+latex allergy**  Denies: DM, blood thinner medication use, epilepsy/seizures, or other known neuro/pulmonary problems   +pacemaker  H/o breast CA - in remission since 2012; breast lumpectomy in 2011 plus s/p chemo and radiation  +HTN  H/o vertigo in 1980's   +neuropathy in B feet after chemo and radiation  H/o HA's and migraines   Previous surgeries include: none orthopedically related       Objective   Positional Testing: {talw:10698} goggles  [place]-Hallpike Right = {talpn:79237::\"negative\"}   [place]-Hallpike Left = {talpn:88032::\"negative\"}   Horizontal Roll Test Right = {talpn:67482::\"negative\"}   Horizontal Roll Test Left = {talpn:41799::\"negative\"}  Side Lying Test Right = {talpn:42689::\"negative\"}   Side Lying Test Left = {talpn:52208::\"negative\"}     Treatments  Therapeutic Activity:   minutes    Time spent on positional testing for multiple canals and assessing symptoms.  Time spent on Answering Darline Gonzalez's questions in full.   Reviewed pathophysiology of BPPV using model, rationale for CRP.  Risk factors associated with INC prevalence of BPPV, possibly that it can return.  Reviewed post CRP precautions: Darline Gonzalez to avoid looking " up/down, quick head turns during ADLs for a few hours after repositioning, to avoid lying down until bed time.  Darline advised that she may feel off balance after the reposition and to use safety measures at home/device with walking or resting as needed to decrease fall risk, Darline Gonzalez verbalized understanding.  Darline Gonzalez Educated on some strategies for management of non-resolving BPPV including:   option of holding each position longer vs going through the positions quicker in attempts to keep the otoconia flowing  recommendation of strict adherence with post repositioning guidelines and avoiding excessive head motions  multiple canal management of most symptomatic canal that day vs bilateral    Canalith Reposition:     minutes  Includes time between CRP and guarding pt for safety  Modified Epley towards L side  Li Maneuver 2X   Patient keeps their head in neutral and goes into side-lying on their affected side x1 minute. Then, rapidly bring them to the unaffected side (1 second) and keep them in side-lying x4 minutes before sitting up with the head still in neutral to finish the maneuver.      Assigned HEP: [employer] Access Code: [email or web address]      EDUCATION: see above

## 2024-10-28 ENCOUNTER — TREATMENT (OUTPATIENT)
Dept: PHYSICAL THERAPY | Facility: CLINIC | Age: 74
End: 2024-10-28
Payer: MEDICARE

## 2024-10-28 DIAGNOSIS — M54.31 SCIATICA OF RIGHT SIDE: ICD-10-CM

## 2024-10-28 DIAGNOSIS — G89.29 CHRONIC LOW BACK PAIN, UNSPECIFIED BACK PAIN LATERALITY, UNSPECIFIED WHETHER SCIATICA PRESENT: ICD-10-CM

## 2024-10-28 DIAGNOSIS — R42 VERTIGO: ICD-10-CM

## 2024-10-28 DIAGNOSIS — M54.50 CHRONIC LOW BACK PAIN, UNSPECIFIED BACK PAIN LATERALITY, UNSPECIFIED WHETHER SCIATICA PRESENT: ICD-10-CM

## 2024-10-28 PROCEDURE — 97140 MANUAL THERAPY 1/> REGIONS: CPT | Mod: GP

## 2024-10-28 PROCEDURE — 97530 THERAPEUTIC ACTIVITIES: CPT | Mod: GP

## 2024-10-29 ENCOUNTER — APPOINTMENT (OUTPATIENT)
Dept: PRIMARY CARE | Facility: CLINIC | Age: 74
End: 2024-10-29
Payer: MEDICARE

## 2024-10-29 VITALS
DIASTOLIC BLOOD PRESSURE: 69 MMHG | OXYGEN SATURATION: 93 % | BODY MASS INDEX: 30.62 KG/M2 | WEIGHT: 202 LBS | HEIGHT: 68 IN | HEART RATE: 66 BPM | SYSTOLIC BLOOD PRESSURE: 115 MMHG

## 2024-10-29 DIAGNOSIS — N18.31 STAGE 3A CHRONIC KIDNEY DISEASE (MULTI): Chronic | ICD-10-CM

## 2024-10-29 DIAGNOSIS — R42 VERTIGO: ICD-10-CM

## 2024-10-29 DIAGNOSIS — E78.00 HYPERCHOLESTEROLEMIA: Chronic | ICD-10-CM

## 2024-10-29 DIAGNOSIS — F33.9 DEPRESSION, RECURRENT (CMS-HCC): ICD-10-CM

## 2024-10-29 DIAGNOSIS — I10 BENIGN ESSENTIAL HYPERTENSION: Primary | Chronic | ICD-10-CM

## 2024-10-29 DIAGNOSIS — K22.2 SCHATZKI'S RING: ICD-10-CM

## 2024-10-29 PROBLEM — R03.1 LOW BLOOD PRESSURE READING: Status: RESOLVED | Noted: 2023-03-21 | Resolved: 2024-10-29

## 2024-10-29 PROCEDURE — 3074F SYST BP LT 130 MM HG: CPT | Performed by: INTERNAL MEDICINE

## 2024-10-29 PROCEDURE — 1036F TOBACCO NON-USER: CPT | Performed by: INTERNAL MEDICINE

## 2024-10-29 PROCEDURE — 3078F DIAST BP <80 MM HG: CPT | Performed by: INTERNAL MEDICINE

## 2024-10-29 PROCEDURE — G2211 COMPLEX E/M VISIT ADD ON: HCPCS | Performed by: INTERNAL MEDICINE

## 2024-10-29 PROCEDURE — 1160F RVW MEDS BY RX/DR IN RCRD: CPT | Performed by: INTERNAL MEDICINE

## 2024-10-29 PROCEDURE — 99214 OFFICE O/P EST MOD 30 MIN: CPT | Performed by: INTERNAL MEDICINE

## 2024-10-29 PROCEDURE — 1158F ADVNC CARE PLAN TLK DOCD: CPT | Performed by: INTERNAL MEDICINE

## 2024-10-29 PROCEDURE — 1123F ACP DISCUSS/DSCN MKR DOCD: CPT | Performed by: INTERNAL MEDICINE

## 2024-10-29 PROCEDURE — 1159F MED LIST DOCD IN RCRD: CPT | Performed by: INTERNAL MEDICINE

## 2024-10-29 RX ORDER — LISINOPRIL 10 MG/1
10 TABLET ORAL
Qty: 90 TABLET | Refills: 3 | Status: SHIPPED | OUTPATIENT
Start: 2024-10-29 | End: 2025-10-29

## 2024-11-04 ENCOUNTER — TREATMENT (OUTPATIENT)
Dept: PHYSICAL THERAPY | Facility: CLINIC | Age: 74
End: 2024-11-04
Payer: MEDICARE

## 2024-11-04 DIAGNOSIS — M54.31 SCIATICA OF RIGHT SIDE: ICD-10-CM

## 2024-11-04 DIAGNOSIS — G89.29 CHRONIC LOW BACK PAIN, UNSPECIFIED BACK PAIN LATERALITY, UNSPECIFIED WHETHER SCIATICA PRESENT: Primary | ICD-10-CM

## 2024-11-04 DIAGNOSIS — R42 VERTIGO: ICD-10-CM

## 2024-11-04 DIAGNOSIS — M54.50 CHRONIC LOW BACK PAIN, UNSPECIFIED BACK PAIN LATERALITY, UNSPECIFIED WHETHER SCIATICA PRESENT: Primary | ICD-10-CM

## 2024-11-04 PROCEDURE — 97140 MANUAL THERAPY 1/> REGIONS: CPT | Mod: GP | Performed by: GENERAL ACUTE CARE HOSPITAL

## 2024-11-04 NOTE — PROGRESS NOTES
"Physical Therapy    Physical Therapy Treatment      Patient Name: Darline Gonzalez \"Pronounces name Lorena\"  MRN: [id number]  Today's Date: 11/4/2024  Time Calculation  Start Time: 1411  Stop Time: 1456  Time Calculation (min): 45 min    Insurance:  Visit number: 12  Insurance Type: Payor: MEDICARE / Plan: MEDICARE PART A AND B / Product Type: *No Product type* /   Certification Period Start Date: 05/30/24 for BPPV >> Re-Certification Period Start Date 8/27/24  Certification Period End Date: 08/28/24 for BPPV >> Re-Certification Period End Date 11/25/24  Certification Period Start Date: 06/04/24 for LBP >>  Re-Certification Period Start Date 10/28/24  Certification Period End Date: 09/02/24 for LBP >>  Re-Certification Period End Date 1/26/25  Referred by: Jo Sanchez MD    ** LBP added to PT POC on 6/4/2025**    Assessment:  Darline Gonzalez completed treatment today which focused upon manual in order to address ongoing pain/increased tightness/soft tissue dysfunction.  Darline presents with increased tightness in B lumbar and +TrP with pain/tenderness in R glute.  Darline's  response to tx was: improved soft tissue dysfunction and decreased in intensity of pain in lumbar/R glute region but no change in LE.  No adverse response to manual/DN noted or reported.  Held stretches due to increase in symptoms in R LE during but they did not linger once terminated.  Darline's Progress towards goals: decreased pain. Darline Gonzalez continues to have pain limiting participation in household chores. Further skilled therapy services are warranted to address the remaining impairments in order to progress towards functional goals. Darline left session with all questions answered and no increase in symptoms.        Plan: Monitor response to manual interventions for low back and RLE radicular symptoms. Resume BPPV treatment as able      Therapy Diagnosis:   Problem List Items Addressed This Visit             ICD-10-CM    " "Vertigo R42     Other Visit Diagnoses         Codes    Chronic low back pain, unspecified back pain laterality, unspecified whether sciatica present    -  Primary M54.50, G89.29    Sciatica of right side     M54.31              Subjective    Darline Gonzalez denies any falls or complications since last session. Darline A Lisa reports good relief from pain after manual last session but pain returned.  Has not been dizzy.     Compliance with HEP: not applicable as manual last session once LB was added to POC.    Pain:  4/10  Pain location: central R glute and distal to the foot.      Precautions:  Fall Risk: Moderate   \"Pronounces name Sure-ree\"  **+osteopenia in L femoral neck, lumbar spine normal on recent DEXA scan**   **+pacemaker**  **+latex allergy**  Denies: DM, blood thinner medication use, epilepsy/seizures, or other known neuro/pulmonary problems   +pacemaker  H/o breast CA - in remission since 2012; breast lumpectomy in 2011 plus s/p chemo and radiation  +HTN  H/o vertigo in 1980's   +neuropathy in B feet after chemo and radiation  H/o HA's and migraines   Previous surgeries include: none orthopedically related       Objective     Treatments    Assigned HEP: [employer] Access Code: [email or web address]    Manual Therapy:  40 minutes   In prone  STM and myofascial cupping using large blue cup and small black cup over rory lumbar paraspinals and R > L QL   DN to B L4-5 multifidus using 0.23 x 30 mm  DN to tender nodules in R glute using 0.3 x 75 mm  Time spent on palpation with soft tissue assessment, positioning, skin prep and education included.  2 Minute(s) unbilled as actual DN time.     Therapeutic Exercise:  3 minutes    Seated piriformis stretch-held as no   Standing glute stretch- held due to increase in radicular symptoms    EDUCATION: Rationale for treatment interventions  Reviewed rationale and evidence for myofascial cupping, answered Darline Gonzalez's questions, no contraindications present and Darline" agreed to myofascial cupping.  The rationale, potential benefits, and risks for dry needling (DN) were discussed with Darline Gonzalez. Reviewed precautions to dry needling and written/verbal consent obtained by Darline Gonzalez (see chart for written consent).  No contraindications present.  her questions were answered and she agreed to dry needling treatment today.  Darline Gonzalez  advised  that following dry needling she may feel muscle soreness lasting 1-3 days, and that this is normal. Darline Gonzalez  advised to continue to stretch every hour for the next 1-2 days and educated in rationale for MHP and parameters to address post DN soreness. Darline Gonzalez may resume normal activity and exercise following DN as able. Please contact the office if any adverse symptoms occur outside of typical muscle soreness.     Time Calculation  Start Time: 1411  Stop Time: 1456  Time Calculation (min): 45 min     PT Therapeutic Procedures Time Entry  Manual Therapy Time Entry: 40  Therapeutic Exercise Time Entry: 3

## 2024-11-07 NOTE — PROGRESS NOTES
"Physical Therapy    Physical Therapy Treatment      Patient Name: Darline Gonzalez \"Pronounces name Lorena\"  MRN: [id number]  Today's Date: 11/11/2024  Time Calculation  Start Time: 1651  Stop Time: 1730  Time Calculation (min): 39 min    Insurance:  Visit number: 13  Insurance Type: Payor: MEDICARE / Plan: MEDICARE PART A AND B / Product Type: *No Product type* /   Certification Period Start Date: 05/30/24 for BPPV >> Re-Certification Period Start Date 8/27/24  Certification Period End Date: 08/28/24 for BPPV >> Re-Certification Period End Date 11/25/24  Certification Period Start Date: 06/04/24 for LBP >>  Re-Certification Period Start Date 10/28/24  Certification Period End Date: 09/02/24 for LBP >>  Re-Certification Period End Date 1/26/25  Referred by: Jo Sanchez MD    ** LBP added to PT POC on 6/4/2025**    Assessment:  Pt arrived late, session completed in available amount of time. Darline Gonzalez completed treatment today which focused upon manual interventions in order to address ongoing low back pain.  Darline presents with increased tightness in R lumbar/sacral paraspinals and proximal glute max. Darline's response to tx was: mild reduction in pain to 5/10 exiting clinic.  No adverse response to manual/DN noted or reported. Verbally added bridges to HEP to prolong benefits of manual interventions. Darline's Progress towards goals: decreased pain. Darline Gonzalez continues to have pain limiting participation in household chores. Further skilled therapy services are warranted to address the remaining impairments in order to progress towards functional goals. Darline left session with all questions answered and no increase in symptoms.        Plan: Monitor response to manual interventions for low back and RLE radicular symptoms. Resume BPPV treatment as able- certification period expiring 11/25      Therapy Diagnosis:   Problem List Items Addressed This Visit             ICD-10-CM       ENT    Vertigo " "R42     Other Visit Diagnoses         Codes    Sciatica of right side     M54.31    Chronic low back pain, unspecified back pain laterality, unspecified whether sciatica present     M54.50, G89.29                Subjective    Darline Gonzalez denies any falls or complications since last session. Darline Gonzalez reports questionable benefit from DN, agreeable to trying again today. RLE radicular pain worsened after sleeping last night lasting into today. Felt numbness in RLE while walking at grocery store.     Compliance with HEP: not applicable as manual last session once LB was added to POC.    Pain:  6/10  Pain location: central R glute radiating into ankle    Precautions:  Fall Risk: Moderate   \"Pronounces name Sure-ree\"  **+osteopenia in L femoral neck, lumbar spine normal on recent DEXA scan**   **+pacemaker**  **+latex allergy**  Denies: DM, blood thinner medication use, epilepsy/seizures, or other known neuro/pulmonary problems   +pacemaker  H/o breast CA - in remission since ; breast lumpectomy in  plus s/p chemo and radiation  +HTN  H/o vertigo in    +neuropathy in B feet after chemo and radiation  H/o HA's and migraines   Previous surgeries include: none orthopedically related       Objective     Treatments    Assigned HEP: [employer] Access Code: [email or web address]    Manual Therapy:  35 minutes   In prone  STM and myofascial cupping over R lumbar paraspinals and QL  DN prep/education: time spent on palpation with soft tissue assessment, positioning, skin prep and education of benefits. 4 Minute(s) unbilled as actual DN time.     Dry Needlin minutes  Performed in prone  Dry needling consent form previously scanned into chart, verbal consent obtained  1 DN (0.30 x 50 mm) to tight/tender nodule in R lumbar paraspinals L4 and 1 DN (0.30 x 50 mm) to tight/tender nodule in R sacral paraspinals at S1  [place] removed without bleeding, bruising, or pain reported      EDUCATION:   - Rationale for " treatment interventions  - Darline Gonzalez was educated on risks and benefits associated with dry needling, what to expect, and post-procedure protocol     Time Calculation  Start Time: 1651  Stop Time: 1730  Time Calculation (min): 39 min     PT Therapeutic Procedures Time Entry  Manual Therapy Time Entry: 35  Needle Insertion w/o Injection 1 or 2: 4

## 2024-11-11 ENCOUNTER — TREATMENT (OUTPATIENT)
Dept: PHYSICAL THERAPY | Facility: CLINIC | Age: 74
End: 2024-11-11
Payer: MEDICARE

## 2024-11-11 DIAGNOSIS — M54.31 SCIATICA OF RIGHT SIDE: ICD-10-CM

## 2024-11-11 DIAGNOSIS — R42 VERTIGO: ICD-10-CM

## 2024-11-11 DIAGNOSIS — G89.29 CHRONIC LOW BACK PAIN, UNSPECIFIED BACK PAIN LATERALITY, UNSPECIFIED WHETHER SCIATICA PRESENT: ICD-10-CM

## 2024-11-11 DIAGNOSIS — M54.50 CHRONIC LOW BACK PAIN, UNSPECIFIED BACK PAIN LATERALITY, UNSPECIFIED WHETHER SCIATICA PRESENT: ICD-10-CM

## 2024-11-11 PROCEDURE — 97140 MANUAL THERAPY 1/> REGIONS: CPT | Mod: GP

## 2024-11-18 ENCOUNTER — TREATMENT (OUTPATIENT)
Dept: PHYSICAL THERAPY | Facility: CLINIC | Age: 74
End: 2024-11-18
Payer: MEDICARE

## 2024-11-18 DIAGNOSIS — M54.31 SCIATICA OF RIGHT SIDE: ICD-10-CM

## 2024-11-18 DIAGNOSIS — M54.50 CHRONIC LOW BACK PAIN, UNSPECIFIED BACK PAIN LATERALITY, UNSPECIFIED WHETHER SCIATICA PRESENT: Primary | ICD-10-CM

## 2024-11-18 DIAGNOSIS — G89.29 CHRONIC LOW BACK PAIN, UNSPECIFIED BACK PAIN LATERALITY, UNSPECIFIED WHETHER SCIATICA PRESENT: Primary | ICD-10-CM

## 2024-11-18 DIAGNOSIS — R42 VERTIGO: ICD-10-CM

## 2024-11-18 PROCEDURE — 97140 MANUAL THERAPY 1/> REGIONS: CPT | Mod: GP | Performed by: GENERAL ACUTE CARE HOSPITAL

## 2024-11-18 NOTE — PROGRESS NOTES
"Physical Therapy    Physical Therapy Treatment      Patient Name: Darline Gonzalez \"Pronounces name Lorena\"  MRN: [id number]  Today's Date: 11/18/2024  Time Calculation  Start Time: 1645  Stop Time: 1730  Time Calculation (min): 45 min    Insurance:  Visit number: 14  Insurance Type: Payor: MEDICARE / Plan: MEDICARE PART A AND B / Product Type: *No Product type* /   Certification Period Start Date: 05/30/24 for BPPV >> Re-Certification Period Start Date 8/27/24  Certification Period End Date: 08/28/24 for BPPV >> Re-Certification Period End Date 11/25/24  Certification Period Start Date: 06/04/24 for LBP >>  Re-Certification Period Start Date 10/28/24  Certification Period End Date: 09/02/24 for LBP >>  Re-Certification Period End Date 1/26/25  Referred by: Jo Sanchez MD    ** LBP added to PT POC on 6/4/2025**    Assessment:  Darline Gonzalez completed treatment today which focused upon manual techniques in order to address ongoing R glute pain.  Darline presents with pain/soft tissue dysfunction with trigger band in R piriformis with pain/tenderness and + TrP in R glute med.   Darline's  response to tx was: improved soft tissue mobility with decreased pain/tenderness and Reduced pain post treatment. Darline's Progress towards goals: Reduced frequency of pain. Reduced pain level. No dizziness with side lying to sit today.   Darline Gonzalez continues to have pain limiting participation in household chores and recreational activities. Further skilled therapy services are warranted to address the remaining impairments in order to progress towards functional goals. Darline left session with all questions answered and no increase in symptoms.        Plan: Monitor response to manual interventions for low back and RLE radicular symptoms. Resume BPPV treatment as able- certification period expiring 11/25.      Therapy Diagnosis:   Problem List Items Addressed This Visit             ICD-10-CM    Vertigo R42    Sciatica " "of right side M54.31     Other Visit Diagnoses         Codes    Chronic low back pain, unspecified back pain laterality, unspecified whether sciatica present    -  Primary M54.50, G89.29            Subjective    Darline ZENDEJAS Lisa denies any falls or complications since last session. Darline Daveyremi reports that she would like to focus on her glute.  Does feel DN/manual has helped and she was pain free for several days after last session.     Darline A Lisa reports good compliance with HEP.    Pain:  2-3/10  Pain location: R glute    Precautions:  Fall Risk: Moderate   \"Pronounces name Sure-ree\"  **+osteopenia in L femoral neck, lumbar spine normal on recent DEXA scan**   **+pacemaker**  **+latex allergy**  Denies: DM, blood thinner medication use, epilepsy/seizures, or other known neuro/pulmonary problems   +pacemaker  H/o breast CA - in remission since 2012; breast lumpectomy in 2011 plus s/p chemo and radiation  +HTN  H/o vertigo in 1980's   +neuropathy in B feet after chemo and radiation  H/o HA's and migraines   Previous surgeries include: none orthopedically related       Objective     Treatments    Assigned HEP: [employer] Access Code: [email or web address]    Manual Therapy:  43 minutes   In prone with face in face hole  STM and myofascial cupping using small black cup over R lumbar paraspinals  <  R glute  External glides to resisted tissue    Performed in side lying  DN to tender nodules in R glute using 0.3 x 75 mm  Time spent on palpation with soft tissue assessment, positioning, skin prep and education included.  2 Minute(s) unbilled as actual DN time.       EDUCATION:   Reviewed rationale and evidence for myofascial cupping, answered Darline Gonzalez's questions, no contraindications present and Darline agreed to myofascial cupping.   Darline Gonzalez  advised  that following dry needling she may feel muscle soreness lasting 1-3 days, and that this is normal. Darline Gonzalez  advised to continue to stretch every " hour for the next 1-2 days and educated in rationale for MHP and parameters to address post DN soreness. Darline Gonzalez may resume normal activity and exercise following DN as able. Please contact the office if any adverse symptoms occur outside of typical muscle soreness.      Time Calculation  Start Time: 1645  Stop Time: 1730  Time Calculation (min): 45 min     PT Therapeutic Procedures Time Entry  Manual Therapy Time Entry: 43

## 2024-11-25 ENCOUNTER — TREATMENT (OUTPATIENT)
Dept: PHYSICAL THERAPY | Facility: CLINIC | Age: 74
End: 2024-11-25
Payer: MEDICARE

## 2024-11-25 DIAGNOSIS — M54.31 SCIATICA OF RIGHT SIDE: Primary | ICD-10-CM

## 2024-11-25 DIAGNOSIS — G89.29 CHRONIC LOW BACK PAIN, UNSPECIFIED BACK PAIN LATERALITY, UNSPECIFIED WHETHER SCIATICA PRESENT: ICD-10-CM

## 2024-11-25 DIAGNOSIS — R42 VERTIGO: ICD-10-CM

## 2024-11-25 DIAGNOSIS — M54.50 CHRONIC LOW BACK PAIN, UNSPECIFIED BACK PAIN LATERALITY, UNSPECIFIED WHETHER SCIATICA PRESENT: ICD-10-CM

## 2024-11-25 PROCEDURE — 97140 MANUAL THERAPY 1/> REGIONS: CPT | Mod: GP | Performed by: GENERAL ACUTE CARE HOSPITAL

## 2024-11-25 NOTE — PROGRESS NOTES
"Physical Therapy    Physical Therapy Treatment      Patient Name: Darline Gonzalez \"Pronounces name Lorena\"  MRN: [id number]  Today's Date: 11/25/2024  Time Calculation  Start Time: 1645  Stop Time: 1730  Time Calculation (min): 45 min    Insurance:  Visit number: 15  Insurance Type: Payor: MEDICARE / Plan: MEDICARE PART A AND B / Product Type: *No Product type* /   Certification Period Start Date: 05/30/24 for BPPV >> Re-Certification Period Start Date 8/27/24  Certification Period End Date: 08/28/24 for BPPV >> Re-Certification Period End Date 11/25/24  Certification Period Start Date: 06/04/24 for LBP >>  Re-Certification Period Start Date 10/28/24  Certification Period End Date: 09/02/24 for LBP >>  Re-Certification Period End Date 1/26/25  Referred by: Jo Sanchez MD    ** LBP added to PT POC on 6/4/2025**    Assessment:  Darline Gonzalez completed treatment today which focused upon manual techniques in order to address ongoing R glute pain.  Darline presents with improved soft tissue mobility compared to previous sessions, but she does have pain/trigger band in R glute.   + pelvic asymmetry and initiated self MET to address.  Darline's  response to tx was: Improved soft tissue mobility, improved pelvic alignment post self MET, Reduced pain post treatment.  No adverse response to manual/DN noted or reported. Darline's Progress towards goals: Reduced frequency of pain. Reduced pain level. Darline Gonzalez continues to have pain limiting participation in household chores and recreational activities. Further skilled therapy services are warranted to address the remaining impairments in order to progress towards functional goals. Darline left session with all questions answered and no increase in symptoms.        Plan:   Reassess next session.  Monitor response to manual interventions for low back and RLE radicular symptoms.   Resume BPPV treatment as able- certification period expiring 11/25.      Therapy " "Diagnosis:   Problem List Items Addressed This Visit             ICD-10-CM    Vertigo R42    Sciatica of right side - Primary M54.31     Other Visit Diagnoses         Codes    Chronic low back pain, unspecified back pain laterality, unspecified whether sciatica present     M54.50, G89.29              Subjective    Darline Gonzalez denies any falls or complications since last session. Darline Gonzalez reports that she would like to focus on her glute. Darline reports 3 days of relief manual last session.  Darline reports that increased ache in R knee and ankle.      Darline Gonzalez reports good compliance with HEP.    Pain:  2/10  Pain location: R glute    Precautions:  Fall Risk: Moderate   \"Pronounces name Sure-ree\"  **+osteopenia in L femoral neck, lumbar spine normal on recent DEXA scan**   **+pacemaker**  **+latex allergy**  Denies: DM, blood thinner medication use, epilepsy/seizures, or other known neuro/pulmonary problems   +pacemaker  H/o breast CA - in remission since 2012; breast lumpectomy in 2011 plus s/p chemo and radiation  +HTN  H/o vertigo in 1980's   +neuropathy in B feet after chemo and radiation  H/o HA's and migraines   Previous surgeries include: none orthopedically related       Objective   Palpation: R depressed iliac crest and R PSIS    Treatments    Assigned HEP: [employer] Access Code: [email or web address]    Therapeutic Exercise:  6 minutes     Self MET for pelvis in seated - pelvis level following  Includes time assessing pelvic landmarks pre/post MET    Manual Therapy:  37 minutes   In prone with face in face hole  STM and myofascial cupping using small black cup over R lumbar paraspinals  <  R glute  External glides to resisted tissue    Performed in side lying  DN to tender nodules in R glute using 0.3 x 75 mm  Time spent on palpation with soft tissue assessment, positioning, skin prep and education included.  2 Minute(s) unbilled as actual DN time.       EDUCATION:   Reviewed rationale and evidence " for myofascial cupping, answered Darline Gonzalez's questions, no contraindications present and Darline agreed to myofascial cupping.   Darline Gonzalez  advised  that following dry needling she may feel muscle soreness lasting 1-3 days, and that this is normal. Darline Gonzalez  advised to continue to stretch every hour for the next 1-2 days and educated in rationale for MHP and parameters to address post DN soreness. Darline Gonzalez may resume normal activity and exercise following DN as able. Please contact the office if any adverse symptoms occur outside of typical muscle soreness.      Time Calculation  Start Time: 1645  Stop Time: 1730  Time Calculation (min): 45 min     PT Therapeutic Procedures Time Entry  Manual Therapy Time Entry: 37  Therapeutic Exercise Time Entry: 6

## 2024-12-02 ENCOUNTER — TREATMENT (OUTPATIENT)
Dept: PHYSICAL THERAPY | Facility: CLINIC | Age: 74
End: 2024-12-02
Payer: MEDICARE

## 2024-12-02 DIAGNOSIS — R42 VERTIGO: ICD-10-CM

## 2024-12-02 DIAGNOSIS — M54.31 SCIATICA OF RIGHT SIDE: ICD-10-CM

## 2024-12-02 DIAGNOSIS — G89.29 CHRONIC LOW BACK PAIN, UNSPECIFIED BACK PAIN LATERALITY, UNSPECIFIED WHETHER SCIATICA PRESENT: ICD-10-CM

## 2024-12-02 DIAGNOSIS — M54.50 CHRONIC LOW BACK PAIN, UNSPECIFIED BACK PAIN LATERALITY, UNSPECIFIED WHETHER SCIATICA PRESENT: ICD-10-CM

## 2024-12-02 PROCEDURE — 97140 MANUAL THERAPY 1/> REGIONS: CPT | Mod: GP | Performed by: GENERAL ACUTE CARE HOSPITAL

## 2024-12-02 PROCEDURE — 97530 THERAPEUTIC ACTIVITIES: CPT | Mod: GP | Performed by: GENERAL ACUTE CARE HOSPITAL

## 2024-12-02 NOTE — PROGRESS NOTES
"Physical Therapy    Physical Therapy Treatment      Patient Name: Darline Gonzalez \"Pronounces name Lorena\"  MRN: [id number]  Today's Date: 12/2/2024  Time Calculation  Start Time: 1600  Stop Time: 1645  Time Calculation (min): 45 min    Insurance:  Visit number: 16  Insurance Type: Payor: MEDICARE / Plan: MEDICARE PART A AND B / Product Type: *No Product type* /   Certification Period Start Date: 05/30/24 for BPPV >> Re-Certification Period Start Date 8/27/24  Certification Period End Date: 08/28/24 for BPPV >> Re-Certification Period End Date 11/25/24  Certification Period Start Date: 06/04/24 for LBP >>  Re-Certification Period Start Date 10/28/24  Certification Period End Date: 09/02/24 for LBP >>  Re-Certification Period End Date 1/26/25  Referred by: Jo Sanchez MD    ** LBP added to PT POC on 6/4/2025**  Reassess 12/2/2024     Assessment:  Darline Gonzalez  has completed 16 physical therapy sessions to address BPPV and LBP.  Treatment has included: therapeutic exercise, therapeutic activity, manual therapy, and dry needling.  Darline reports good compliance with the prescribed physical therapy home exercise program.  Darline has made some progress towards therapy goals with reduction in dizziness and intermittent pain relief but ongoing pain in R glute persists.       At this time I recommend: Darline continue with physical therapy to address ongoing R glute pain and to reassess for BPPV per pt request despite no recent reports of dizziness.   Darline Gonzalez is in agreement with plan.      Justification for continued skilled care: Darline Gonzalez continues to have pain limiting participation in ADLs, IADLs, and meaningful activities. Further skilled therapy services are warranted to address the remaining impairments in order to progress towards functional goals for the Darline to fully participate in an active lifestyle. Darline left session with all questions answered and no increase in " "symptoms.    Plan:   Darline continue with physical therapy to address ongoing R glute and R radicular pain 1x week for an additional 4 weeks   Darline Gonzalez is in agreement with plan.         Therapy Diagnosis:   Problem List Items Addressed This Visit             ICD-10-CM    Vertigo R42    Relevant Orders    Follow Up In Physical Therapy    Sciatica of right side M54.31    Relevant Orders    Follow Up In Physical Therapy     Other Visit Diagnoses         Codes    Chronic low back pain, unspecified back pain laterality, unspecified whether sciatica present     M54.50, G89.29    Relevant Orders    Follow Up In Physical Therapy                Subjective    Darline Gonzalez denies any falls or complications since last session. Darline Gonzalez reports that she had increased pain in her R glute last night that worsened when she lied down for bed.  Darline does report 3-4 days of relief after manual, then pain returns.     Darline Gonzalez reports compliance with HEP.    Pain:  2-3/10 currently, 6-8/10 last night  Pain location: R glute distal anteriorly to ankle     Precautions:  Fall Risk: Moderate   \"Pronounces name Sure-ree\"  **+osteopenia in L femoral neck, lumbar spine normal on recent DEXA scan**   **+pacemaker**  **+latex allergy**  Denies: DM, blood thinner medication use, epilepsy/seizures, or other known neuro/pulmonary problems   +pacemaker  H/o breast CA - in remission since 2012; breast lumpectomy in 2011 plus s/p chemo and radiation  +HTN  H/o vertigo in 1980's   +neuropathy in B feet after chemo and radiation  H/o HA's and migraines   Previous surgeries include: none orthopedically related       Objective     Lumbar AROM  Flexion: no change in LE symptoms, increase LBP  Extension: no change in LE symptoms, increase LBP  Side bend:  symmetrical and pain R>L  Rotation: symmetrical and no pain    Slump: negative on the R    Treatments    Other Measures  Dizziness Handicap Inventory: 22  Oswestry Disablity " Index (POOJA): 8       Assigned HEP: [employer] Access Code: [email or web address]    Therapeutic Activity: 25 minutes  Assessment of Darline Gonzalez's POC goals completed today- see updated goals, objective, and assessment for further information. Educated Darline Gonzalez  regarding results of examination findings and discussed next steps in PT POC. Educated Darline AASHISH Lisa regarding importance of continuing with good HEP compliance for optimal rehab results.       Manual Therapy:  18 minutes   In prone with face in face hole  STM and myofascial cupping using small black cup over R lumbar paraspinals  <  R glute  External glides to resisted tissue    Performed in side lying  DN to L4, L5 using 0.23 x 30 mm  DN to tender nodules in R glute using 0.3 x 75 mm  Time spent on palpation with soft tissue assessment, positioning, skin prep and education included.  2 Minute(s) unbilled as actual DN time.       EDUCATION:   Reviewed rationale and evidence for myofascial cupping, answered Darline Gonzalez's questions, no contraindications present and Darline agreed to myofascial cupping.   Darline Gonzalez  advised  that following dry needling she may feel muscle soreness lasting 1-3 days, and that this is normal. Darline Gonzalez  advised to continue to stretch every hour for the next 1-2 days and educated in rationale for MHP and parameters to address post DN soreness. Darline Gonzalez may resume normal activity and exercise following DN as able. Please contact the office if any adverse symptoms occur outside of typical muscle soreness.    Goals:  Darline Gonzalez will test negative for positional vertigo.  10/3/24: PROGRESSING- see objective.  Goal Not formally assessed 12/2/2024.  Darline Gonzalez reports no dizziness  over the past week.       Darline Gonzalez will report no complaint of positional imbalance or vertigo for one week with daily activities.  10/3/24: PROGRESSING- only feels spinning intermittently with laying down.  Goal  MET 12/2/2024.        Darline Gonzalez will decrease DHI by >/= 18 points (minimally clinically important difference) or </=34 points (16-34 Points is a mild handicap) in order to improve safety at home and decrease falls risk. Baseline  10/3/24: NOT ASSESSED  Goal Not Met 12/2/2024.  Unchanged and current is 22/100 which is a mild handicap.      Darline Gonzalez will complete sit <> stand transfers using BUE, equal weight bearing on LEs, and no major LOB at completion of transfer during 3/3 trials in order to enhance functional mobility and safety.  10/3/24: GOAL MET.  Goal MET 12/2/2024.        Darline Gonzalez will ambulate with IND on even surfaces for community distances without imbalance or major deviation to safely return to [facility] and enhance access to the community.  10/3/24: GOAL MET.  Goal MET 12/2/2024.         LOW BACK GOALS  Darline Gonzalez will demonstrate independence and report compliance with HEP to facilitate independent rehab program upon discharge. Goal MET 12/2/2024.       Darline Gonzalez will report driving with 75% less pain to allow for return to work and ADLs without limitation.  Goal In Progress 12/2/2024.  Darline A Petarremi reports 40% improvement.       Darlineadán Gonzalez will report 75% improvement in sleep status in order to attain adequate rest. Goal In Progress 12/2/2024.  Darline Gonzalez reports that sleep improves some days, but not consistently.      Darline A Lisawill improve Oswestry (POOJA) score by at least 5 points  in order to reflect improvement in ADL's/pain reduction. Baseline 5/50, (POOJA score of </=10/50 (</=20%) represents minimal to no disability).  Goal MET 12/2/2024.        Darline Gonzalez to increase core/B LE strength in deficient muscles by >/= 1/2 MMT grade to improve dynamic stability during household/occupational/recreational tasks.     Darline A Petarremi will report 75% reduction in pain during ADLs to improve tolerance to household activities. Goal In Progress  12/2/2024.  Darline Gonzalez reports 40% improvement since onset of PT.      Darline Gonzalez will increase lumbar mobility to WFL/symmetrical without pain for unlimited ability to perform home household/occupational/recreational tasks. Goal Not Met 12/2/2024.  Darline Gonzalez reports pain with lumbar AROM except no pain with rotation.  SB/rotation WNL.     Darline Gonzalez will demonstrate good posture with <2 cues for correction during 45 minute treatment session in order to enhance body mechanics with ADLs, functional mobility, and occupational duties.  Goal In Progress 12/2/2024.  Darline Gonzalez able to improve posture with cues.      Darline Gonzalez will be able to perform all aspects of bed mobility independently without pain or increase in time to complete. Goal Not Met 12/2/2024.  Darline Gonzalez reports some days she had pain, some days she doesn't.      Darline Gonzalez will demonstrate appropriate lifting technique with <2 cues for correction using golfer's lift to pick objects off the floor (at home, and light objects at work) and with moving/handling heavy packages while protecting integrity of low back to safely perform ADLs/return to work.  Goal removed 12/2/2024, Darline reports minimal lifting at home.      Darline Gonzalez will ambulate with IND on even surfaces without distance restriction, pain, major deviation or instability to improve participation in household/recreational/occupational duties.  Goal MET 12/2/2024.   Darline reports walking  not limited.     Darline Gonzalez will be able to ascend/descend > or equal to 8 steps with use of unilateral handrail reciprocally without pain/difficulty in order for patient to be able to ambulate safely on all levels of home and in the community.  Goal partially MET 12/2/2024.   Darline reports pain at times.      Darline Gonzalez will demonstrate independence and report compliance with HEP to facilitate independent rehab program upon discharge.  Goal  MET 12/2/2024.       Time Calculation  Start Time: 1600  Stop Time: 1645  Time Calculation (min): 45 min     PT Therapeutic Procedures Time Entry  Manual Therapy Time Entry: 18  Therapeutic Activity Time Entry: 25

## 2024-12-05 NOTE — PROGRESS NOTES
"Physical Therapy    Physical Therapy Treatment    Patient Name: Darline Gonzalez  MRN: [id number]  Today's Date: 12/9/2024  Time Calculation  Start Time: 1518  Stop Time: 1558  Time Calculation (min): 40 min    Insurance - reviewed   Visit number: 17 (updated 12/09/24)   Payor: MEDICARE / Plan: MEDICARE PART A AND B / Product Type: *No Product type* /    Certification Period Start Date: 05/30/24 for BPPV >> Re-Certification Period Start Date 8/27/24  Certification Period End Date: 08/28/24 for BPPV >> Re-Certification Period End Date 11/25/24  Certification Period Start Date: 06/04/24 for LBP >>  Re-Certification Period Start Date 10/28/24  Certification Period End Date: 09/02/24 for LBP >>  Re-Certification Period End Date 1/26/25  Referred by: Jo Sanchez MD    ** LBP added to PT POC on 6/4/2025*  Referring Provider: Jo Sanchez MD       Current Problem  Problem List Items Addressed This Visit             ICD-10-CM       ENT    Vertigo R42       Musculoskeletal and Injuries    Sciatica of right side M54.31     Other Visit Diagnoses         Codes    Chronic low back pain, unspecified back pain laterality, unspecified whether sciatica present     M54.50, G89.29              Precautions  Fall Risk: Moderate   \"Pronounces name Sure-ree\"  **+osteopenia in L femoral neck, lumbar spine normal on recent DEXA scan**   **+pacemaker**  **+latex allergy**  Denies: DM, blood thinner medication use, epilepsy/seizures, or other known neuro/pulmonary problems   +pacemaker  H/o breast CA - in remission since 2012; breast lumpectomy in 2011 plus s/p chemo and radiation  +HTN  H/o vertigo in 1980's   +neuropathy in B feet after chemo and radiation  H/o HA's and migraines   Previous surgeries include: none orthopedically related     General  Subjective      Darline Gonzalez denies any falls or complications since last session. Darline Gonzalez reports relief x5 days after DN last session. Pain has since returned but with " decreased intensity.     Darline Gonzalez reports poor compliance with HEP.    Pain:  4/10  Pain location: R glute distal anteriorly to ankle       Objective     Treatments:  Abbreviation Key  NC = not completed this visit   NV = next visit  // = parallel    Assigned HEP- [employer] Access Code: [email or web address]     Therapeutic Exercise:  7 minutes    Bridges, 2x10  Sidelying hip ABD, 2x10 each side    Manual Therapy:  29 minutes   In prone with face in face hole  STM and myofascial cupping using small black cup over R lumbar paraspinals and  R glutes    Dry Needlin minutes  Time spent on palpation with soft tissue assessment, positioning, skin prep, and education included  Performed in prone.   Dry needling consent form previously scanned into chart, verbal consent obtained  1 DN (.30 x 30 mm) to tight/tender nodule in R multifidus near L5  and 1 DN (.30 x 50 mm) to tight/tender nodule in R proximal lateral glute max  Needles removed without bleeding, bruising, or pain reported      Outpatient Education: Reviewed home exercise program. Provided verbal feedback to improve exercise technique. Darline Gonzalez was educated on risks and benefits associated with dry needling, what to expect, and post-procedure protocol     Darline Gonzalez verbalizes understanding of all education provided: Yes    Assessment:  Darline Gonzalez completed treatment today which focused upon myofascial cupping and DN in order to address ongoing R sided lumbar radiculopathy.  Darline presents with mod tightness in R lumbar multifidus and proximal glute max. Re-educated Darline on importance of HEP compliance to maintain benefits of manual interventions between sessions- verbalized understanding. Followed manual interventions with glute strengthening- mildly challenged due to weakness. Darline's  response to tx was: Reduced pain post treatment. Abolished pain in low back, rated 2/10 in R knee and ankle. Darline's Progress towards goals: Reduced  intensity of pain.. Darline Gonzalez continues to have decreased strength, decreased ROM, and pain limiting participation in household chores, recreational activities, and sleep. Further skilled therapy services are warranted to address the remaining impairments in order to progress towards functional goals. Darline left session with all questions answered and no increase in symptoms.      Plan:  Manual interventions for pain control PRN. Progress core/hip strength as able.      Time Calculation  Start Time: 1518  Stop Time: 1558  Time Calculation (min): 40 min     PT Therapeutic Procedures Time Entry  Manual Therapy Time Entry: 29  Therapeutic Exercise Time Entry: 7  Needle Insertion w/o Injection 1 or 2: 4

## 2024-12-09 ENCOUNTER — TREATMENT (OUTPATIENT)
Dept: PHYSICAL THERAPY | Facility: CLINIC | Age: 74
End: 2024-12-09
Payer: MEDICARE

## 2024-12-09 DIAGNOSIS — M54.50 CHRONIC LOW BACK PAIN, UNSPECIFIED BACK PAIN LATERALITY, UNSPECIFIED WHETHER SCIATICA PRESENT: ICD-10-CM

## 2024-12-09 DIAGNOSIS — G89.29 CHRONIC LOW BACK PAIN, UNSPECIFIED BACK PAIN LATERALITY, UNSPECIFIED WHETHER SCIATICA PRESENT: ICD-10-CM

## 2024-12-09 DIAGNOSIS — R42 VERTIGO: ICD-10-CM

## 2024-12-09 DIAGNOSIS — M54.31 SCIATICA OF RIGHT SIDE: ICD-10-CM

## 2024-12-09 PROCEDURE — 97140 MANUAL THERAPY 1/> REGIONS: CPT | Mod: GP

## 2024-12-09 PROCEDURE — 97110 THERAPEUTIC EXERCISES: CPT | Mod: GP

## 2024-12-17 ENCOUNTER — TREATMENT (OUTPATIENT)
Dept: PHYSICAL THERAPY | Facility: CLINIC | Age: 74
End: 2024-12-17
Payer: MEDICARE

## 2024-12-17 DIAGNOSIS — M54.31 SCIATICA OF RIGHT SIDE: ICD-10-CM

## 2024-12-17 DIAGNOSIS — G89.29 CHRONIC LOW BACK PAIN, UNSPECIFIED BACK PAIN LATERALITY, UNSPECIFIED WHETHER SCIATICA PRESENT: Primary | ICD-10-CM

## 2024-12-17 DIAGNOSIS — M54.50 CHRONIC LOW BACK PAIN, UNSPECIFIED BACK PAIN LATERALITY, UNSPECIFIED WHETHER SCIATICA PRESENT: Primary | ICD-10-CM

## 2024-12-17 DIAGNOSIS — R42 VERTIGO: ICD-10-CM

## 2024-12-17 PROCEDURE — 97140 MANUAL THERAPY 1/> REGIONS: CPT | Mod: GP | Performed by: GENERAL ACUTE CARE HOSPITAL

## 2024-12-17 NOTE — PROGRESS NOTES
"Physical Therapy    Physical Therapy Treatment    Patient Name: Darline Gonzalez  MRN: [id number]  Today's Date: 12/17/2024  Time Calculation  Start Time: 1557  Stop Time: 1626  Time Calculation (min): 29 min    Insurance - reviewed   Visit number: 18 (updated 12/17/24)   Payor: MEDICARE / Plan: MEDICARE PART A AND B / Product Type: *No Product type* /    Certification Period Start Date: 05/30/24 for BPPV >> Re-Certification Period Start Date 8/27/24  Certification Period End Date: 08/28/24 for BPPV >> Re-Certification Period End Date 11/25/24  Certification Period Start Date: 06/04/24 for LBP >>  Re-Certification Period Start Date 10/28/24  Certification Period End Date: 09/02/24 for LBP >>  Re-Certification Period End Date 1/26/25  Referred by: Jo Sanchez MD    ** LBP added to PT POC on 6/4/2025*  Referring Provider: Jo Sanchez MD       Current Problem  Problem List Items Addressed This Visit             ICD-10-CM    Vertigo R42    Sciatica of right side M54.31     Other Visit Diagnoses         Codes    Chronic low back pain, unspecified back pain laterality, unspecified whether sciatica present    -  Primary M54.50, G89.29              Precautions  Fall Risk: Moderate   \"Pronounces name Sure-ree\"  **+osteopenia in L femoral neck, lumbar spine normal on recent DEXA scan**   **+pacemaker**  **+latex allergy**  Denies: DM, blood thinner medication use, epilepsy/seizures, or other known neuro/pulmonary problems   +pacemaker  H/o breast CA - in remission since 2012; breast lumpectomy in 2011 plus s/p chemo and radiation  +HTN  H/o vertigo in 1980's   +neuropathy in B feet after chemo and radiation  H/o HA's and migraines   Previous surgeries include: none orthopedically related     General  Subjective      Darline Gonzalez denies any falls or complications since last session. Darline Gonzalez reports that \"I only had one bad day\" since last session.      Darline Gonzalez reports sporadic compliance with " HEP.     Pain:  2/10  Pain location: R glute distal      Objective     Treatments:  Abbreviation Key  NC = not completed this visit   NV = next visit  // = parallel    Assigned HEP- Medbridge Access Code: [email or web address]     Manual Therapy:  27 minutes   In prone with face in face hole  STM and myofascial cupping using small black cup over R lumbar paraspinals and R glutes  DN to B L4-L5 multifidus and longissimus using 0.23 x 30 mm  DN to tender nodules in R proximal lateral glute using 0.3 x 75 mm  Time spent on palpation with soft tissue assessment, positioning, skin prep and education included.  2 Minute(s) unbilled as actual DN time.         Outpatient Education: Reviewed home exercise program. Provided verbal feedback to improve exercise technique. Darline Gonzalez was educated on risks and benefits associated with dry needling, what to expect, and post-procedure protocol     Darline Gonzalez verbalizes understanding of all education provided: Yes    Assessment:  Not full time as Darline arrived late to appointment.  Darline Gonzalez completed treatment today which focused upon myofascial cupping and DN in order to address ongoing R sided lumbar radiculopathy. Darline presents with improved soft tissue mobility in R glute compared to previous sessions but still increased tightness in R lumbar. Darline's  response to tx was: improved soft tissue mobility. Reduced pain post treatment.  No adverse response to manual/DN noted or reported.  Darline's Progress towards goals: Reduced frequency of pain. Reduced intensity of pain.. Darline Gonzalez continues to have pain limiting participation in household chores and recreational activities. Further skilled therapy services are warranted to address the remaining impairments in order to progress towards functional goals. Darline left session with all questions answered and no increase in symptoms.  Darline Gonzalez continues to have decreased strength, decreased ROM, and pain  limiting participation in household chores, recreational activities, and sleep. Further skilled therapy services are warranted to address the remaining impairments in order to progress towards functional goals. Darline left session with all questions answered and no increase in symptoms.      Plan:  Manual interventions for pain control PRN. Progress core/hip strength as able.      Time Calculation  Start Time: 1557  Stop Time: 1626  Time Calculation (min): 29 min     PT Therapeutic Procedures Time Entry  Manual Therapy Time Entry: 27

## 2024-12-19 DIAGNOSIS — F41.1 GENERALIZED ANXIETY DISORDER: ICD-10-CM

## 2024-12-19 RX ORDER — ESCITALOPRAM OXALATE 10 MG/1
10 TABLET ORAL DAILY
Qty: 90 TABLET | Refills: 0 | Status: SHIPPED | OUTPATIENT
Start: 2024-12-19 | End: 2025-03-19

## 2024-12-19 NOTE — PROGRESS NOTES
"Physical Therapy    Physical Therapy Treatment    Patient Name: Darline Gonzalez  MRN: [id number]  Today's Date: 12/23/2024  Time Calculation  Start Time: 1602  Stop Time: 1645  Time Calculation (min): 43 min    Insurance - reviewed   Visit number: 19 (updated 12/23/24)   Payor: MEDICARE / Plan: MEDICARE PART A AND B / Product Type: *No Product type* /    Certification Period Start Date: 05/30/24 for BPPV >> Re-Certification Period Start Date 8/27/24  Certification Period End Date: 08/28/24 for BPPV >> Re-Certification Period End Date 11/25/24  Certification Period Start Date: 06/04/24 for LBP >>  Re-Certification Period Start Date 10/28/24  Certification Period End Date: 09/02/24 for LBP >>  Re-Certification Period End Date 1/26/25  Referred by: Jo Sanchez MD    ** LBP added to PT POC on 6/4/2025*  Referring Provider: Jo Sanchez MD       Current Problem  Problem List Items Addressed This Visit             ICD-10-CM       ENT    Vertigo R42       Musculoskeletal and Injuries    Sciatica of right side M54.31     Other Visit Diagnoses         Codes    Chronic low back pain, unspecified back pain laterality, unspecified whether sciatica present     M54.50, G89.29              Precautions  Fall Risk: Moderate   \"Pronounces name Sure-ree\"  **+osteopenia in L femoral neck, lumbar spine normal on recent DEXA scan**   **+pacemaker**  **+latex allergy**  Denies: DM, blood thinner medication use, epilepsy/seizures, or other known neuro/pulmonary problems   +pacemaker  H/o breast CA - in remission since 2012; breast lumpectomy in 2011 plus s/p chemo and radiation  +HTN  H/o vertigo in 1980's   +neuropathy in B feet after chemo and radiation  H/o HA's and migraines   Previous surgeries include: none orthopedically related     General  Subjective      Darline Gonzalez denies any falls or complications since last session. Darline Gonzalez reports \"only 2 bad days\" since last session. Experienced tingling into RLE " "while waiting in line to  ham- felt like leg was going to give out. Also having \"catching\" in L groin after sitting for extended periods of time.    Darline Gonzalez reports poor compliance with HEP- \"I am doing very little to be honest\"    Pain:  3-4/10  Pain location: low back > R anterior thigh      Objective     Treatments:  Abbreviation Key  NC = not completed this visit   NV = next visit  // = parallel    Assigned HEP- Medbridge Access Code: [email or web address]     Therapeutic Exercise:  32 minutes    Prone lie x3 min *mild reduction in pain to 2/10*  Supine Hip External Rotation Stretch, 3x30 sec  Supine butterfly stretch, 3x30 sec  Bridges, 3x10 with 5 sec hold  Sidelying hip ABD, 3x10 each side    Manual Therapy:  7 minutes   STM over R lumbar/sacral paraspinals. Performed in prone    Dry Needlin minutes  Time spent on palpation with soft tissue assessment, positioning, skin prep, and education included  Performed in prone.   Dry needling consent form previously scanned into chart, verbal consent obtained  1 DN (.30 x 40 mm) to tight/tender nodule in R multifidus near L5  and 1 DN (.30 x 40 mm) to tight/tender nodule in R multifidus near S1  [place] removed without bleeding, bruising, or pain reported      Outpatient Education: Reviewed home exercise program. Provided verbal feedback to improve exercise technique. Patient was educated on risks and benefits associated with dry needling, what to expect, and post-procedure protocol     Darline Daveyremi verbalizes understanding of all education provided: Yes    Assessment:  Darline Gonzalez completed treatment today which focused upon global hip stretching and strengthening in order to address ongoing R sided lumbar radiculopathy. Appears to have unloaded extension bias- encouraged position use for pain control. Poor HEP compliance remains a barrier to care at this time.  Darline presents with mod limitations in rory hip ADD and R hip ER flexibility. Updated HEP " to include stretches. Continued with DN due to reported benefit. Darline's  response to tx was: Reduced pain post treatment. Abolished pain in low back, R glute pain 2/10 exiting clinic. Darline's Progress towards goals: Reduced intensity of pain. Darline Gonzalez continues to have decreased strength, decreased ROM, and pain limiting participation in household chores, recreational activities, and sleep. Further skilled therapy services are warranted to address the remaining impairments in order to progress towards functional goals. Darline left session with all questions answered and no increase in symptoms.      Plan:  Manual interventions for pain control PRN. Progress core/hip strength as able.      Time Calculation  Start Time: 1602  Stop Time: 1645  Time Calculation (min): 43 min     PT Therapeutic Procedures Time Entry  Manual Therapy Time Entry: 7  Therapeutic Exercise Time Entry: 32  Needle Insertion w/o Injection 1 or 2: 4

## 2024-12-23 ENCOUNTER — TREATMENT (OUTPATIENT)
Dept: PHYSICAL THERAPY | Facility: CLINIC | Age: 74
End: 2024-12-23
Payer: MEDICARE

## 2024-12-23 DIAGNOSIS — R42 VERTIGO: ICD-10-CM

## 2024-12-23 DIAGNOSIS — G89.29 CHRONIC LOW BACK PAIN, UNSPECIFIED BACK PAIN LATERALITY, UNSPECIFIED WHETHER SCIATICA PRESENT: ICD-10-CM

## 2024-12-23 DIAGNOSIS — M54.50 CHRONIC LOW BACK PAIN, UNSPECIFIED BACK PAIN LATERALITY, UNSPECIFIED WHETHER SCIATICA PRESENT: ICD-10-CM

## 2024-12-23 DIAGNOSIS — M54.31 SCIATICA OF RIGHT SIDE: ICD-10-CM

## 2024-12-23 PROCEDURE — 97140 MANUAL THERAPY 1/> REGIONS: CPT | Mod: GP

## 2024-12-23 PROCEDURE — 97110 THERAPEUTIC EXERCISES: CPT | Mod: GP

## 2024-12-23 NOTE — PROGRESS NOTES
"Physical Therapy    Physical Therapy Treatment    Patient Name: Darline Gonzalez  MRN: [id number]  Today's Date: 12/30/2024       Insurance - reviewed   Visit number: 20 (updated 12/23/24)   Payor: MEDICARE / Plan: MEDICARE PART A AND B / Product Type: *No Product type* /    Certification Period Start Date: 05/30/24 for BPPV >> Re-Certification Period Start Date 8/27/24  Certification Period End Date: 08/28/24 for BPPV >> Re-Certification Period End Date 11/25/24  Certification Period Start Date: 06/04/24 for LBP >>  Re-Certification Period Start Date 10/28/24  Certification Period End Date: 09/02/24 for LBP >>  Re-Certification Period End Date 1/26/25  Referred by: Jo Sanchez MD    ** LBP added to PT POC on 6/4/2025*  Referring Provider: Jo Sanchez MD       Current Problem  Problem List Items Addressed This Visit             ICD-10-CM       ENT    Vertigo R42         Precautions  Fall Risk: Moderate   \"Pronounces name Sure-ree\"  **+osteopenia in L femoral neck, lumbar spine normal on recent DEXA scan**   **+pacemaker**  **+latex allergy**  Denies: DM, blood thinner medication use, epilepsy/seizures, or other known neuro/pulmonary problems   +pacemaker  H/o breast CA - in remission since 2012; breast lumpectomy in 2011 plus s/p chemo and radiation  +HTN  H/o vertigo in 1980's   +neuropathy in B feet after chemo and radiation  H/o HA's and migraines   Previous surgeries include: none orthopedically related     General  Subjective      Darline Gonzalez denies any falls or complications since last session. Darline Gonzalez reports ***    Darline Gonzalez reports poor compliance with HEP- \"I am doing very little to be honest\"    Pain:  ***/10  Pain location: low back > R anterior thigh      Objective     Treatments:  Abbreviation Key  NC = not completed this visit   NV = next visit  // = parallel    Assigned HEP- Medbridge Access Code: [email or web address]     Therapeutic Exercise:  32 minutes    Prone lie x3 min *mild " reduction in pain to 2/10*  Supine Hip External Rotation Stretch, 3x30 sec  Supine butterfly stretch, 3x30 sec  Bridges, 3x10 with 5 sec hold  Sidelying hip ABD, 3x10 each side    Manual Therapy:  7 minutes   STM over R lumbar/sacral paraspinals. Performed in prone    Dry Needlin minutes  Time spent on palpation with soft tissue assessment, positioning, skin prep, and education included  Performed in prone.   Dry needling consent form previously scanned into chart, verbal consent obtained  1 DN (.30 x 40 mm) to tight/tender nodule in R multifidus near L5  and 1 DN (.30 x 40 mm) to tight/tender nodule in R multifidus near S1  [place] removed without bleeding, bruising, or pain reported      Outpatient Education: Reviewed home exercise program. Provided verbal feedback to improve exercise technique. Patient was educated on risks and benefits associated with dry needling, what to expect, and post-procedure protocol     Darline Gonzalez verbalizes understanding of all education provided: Yes    Assessment:  Darline Gonzalez completed treatment today which focused upon *** in order to address ongoing R sided lumbar radiculopathy.  Darline presents with ***. Darline's  response to tx was: Reduced pain post treatment. *** Darline's Progress towards goals: Reduced intensity of pain. Darline Gonzalez continues to have decreased strength, decreased ROM, and pain limiting participation in household chores, recreational activities, and sleep. Further skilled therapy services are warranted to address the remaining impairments in order to progress towards functional goals. Darline left session with all questions answered and no increase in symptoms.      Plan:  Manual interventions for pain control PRN. Progress core/hip strength as able.

## 2024-12-30 ENCOUNTER — APPOINTMENT (OUTPATIENT)
Dept: PHYSICAL THERAPY | Facility: CLINIC | Age: 74
End: 2024-12-30
Payer: MEDICARE

## 2024-12-30 DIAGNOSIS — R42 VERTIGO: ICD-10-CM

## 2025-01-06 ENCOUNTER — DOCUMENTATION (OUTPATIENT)
Dept: PHYSICAL THERAPY | Facility: CLINIC | Age: 75
End: 2025-01-06
Payer: MEDICARE

## 2025-01-06 ENCOUNTER — APPOINTMENT (OUTPATIENT)
Dept: PHYSICAL THERAPY | Facility: CLINIC | Age: 75
End: 2025-01-06
Payer: MEDICARE

## 2025-01-06 DIAGNOSIS — R42 VERTIGO: ICD-10-CM

## 2025-01-06 NOTE — PROGRESS NOTES
Physical Therapy                       Therapy Communication Note    Patient Name: Darline Gonzalez  MRN: [id number]  Today's Date: 1/6/2025     Discipline: Physical Therapy    Missed Visit Reason:  illness    Missed Time: Cancel

## 2025-01-06 NOTE — PROGRESS NOTES
"Physical Therapy    Physical Therapy Treatment    Patient Name: Darline Gonzalez  MRN: [id number]  Today's Date: 1/6/2025       Insurance - reviewed   Visit number: 20 (updated 01/05/25)   Payor: MEDICARE / Plan: MEDICARE PART A AND B / Product Type: *No Product type* /    Certification Period Start Date: 05/30/24 for BPPV >> Re-Certification Period Start Date 8/27/24  Certification Period End Date: 08/28/24 for BPPV >> Re-Certification Period End Date 11/25/24  Certification Period Start Date: 06/04/24 for LBP >>  Re-Certification Period Start Date 10/28/24  Certification Period End Date: 09/02/24 for LBP >>  Re-Certification Period End Date 1/26/25  Referred by: Jo Sanchez MD    ** LBP added to PT POC on 6/4/2025*  Referring Provider: Jo Sanchez MD       Current Problem  Problem List Items Addressed This Visit             ICD-10-CM    Vertigo R42         Precautions  Fall Risk: Moderate   \"Pronounces name Sure-ree\"  **+osteopenia in L femoral neck, lumbar spine normal on recent DEXA scan**   **+pacemaker**  **+latex allergy**  Denies: DM, blood thinner medication use, epilepsy/seizures, or other known neuro/pulmonary problems   +pacemaker  H/o breast CA - in remission since 2012; breast lumpectomy in 2011 plus s/p chemo and radiation  +HTN  H/o vertigo in 1980's   +neuropathy in B feet after chemo and radiation  H/o HA's and migraines   Previous surgeries include: none orthopedically related     General  Subjective      Darline Gonzalez denies any falls or complications since last session. Darline Gonzalez reports ***    Darline Gonzalez reports poor compliance with HEP- \"I am doing very little to be honest\"    Pain:  ***/10  Pain location: low back > R anterior thigh      Objective     Treatments:  Abbreviation Key  NC = not completed this visit   NV = next visit  // = parallel    Assigned HEP- Medbridge Access Code: [email or web address]     Therapeutic Exercise:    minutes    Prone lie x3 min *mild reduction in " pain to 2/10*  Supine Hip External Rotation Stretch, 3x30 sec  Supine butterfly stretch, 3x30 sec  Bridges, 3x10 with 5 sec hold  Sidelying hip ABD, 3x10 each side    Manual Therapy:    minutes   STM over R lumbar/sacral paraspinals. Performed in prone    Dry Needling: *** minutes  Time spent on palpation with soft tissue assessment, positioning, skin prep, and education included  Performed in prone.   Dry needling consent form previously scanned into chart, verbal consent obtained  1 DN (.30 x 40 mm) to tight/tender nodule in R multifidus near L5  and 1 DN (.30 x 40 mm) to tight/tender nodule in R multifidus near S1  [place] removed without bleeding, bruising, or pain reported      Outpatient Education: Reviewed home exercise program. Provided verbal feedback to improve exercise technique. Patient was educated on risks and benefits associated with dry needling, what to expect, and post-procedure protocol     Darline Gonzalez verbalizes understanding of all education provided: Yes    Assessment:  Darline Gonzalez completed treatment today which focused upon *** in order to address ongoing R sided lumbar radiculopathy.  Darline presents with ***. Darline's  response to tx was: Reduced pain post treatment. *** Darline's Progress towards goals: Reduced intensity of pain. Darline Gonzalez continues to have decreased strength, decreased ROM, and pain limiting participation in household chores, recreational activities, and sleep. Further skilled therapy services are warranted to address the remaining impairments in order to progress towards functional goals. Darline left session with all questions answered and no increase in symptoms.      Plan:  Manual interventions for pain control PRN. Progress core/hip strength as able.  Reassess next session.

## 2025-01-13 ENCOUNTER — DOCUMENTATION (OUTPATIENT)
Dept: PHYSICAL THERAPY | Facility: CLINIC | Age: 75
End: 2025-01-13
Payer: MEDICARE

## 2025-01-13 DIAGNOSIS — R42 VERTIGO: ICD-10-CM

## 2025-01-13 NOTE — PROGRESS NOTES
Physical Therapy                 Therapy Communication Note    Patient Name: Darline Gonzalez  MRN: [id number]  Today's Date: 1/13/2025     Discipline: Physical Therapy    Comment: No show to today's appointment. Last scheduled appointment- therapist left voicemail for patient informing her that she must reschedule by 1/26/25 (end of Medicare certification period) if interested in continuing therapy. If no contact is made with clinic by stated date, Darline Gonzalez will be discharged from PT services.

## 2025-01-21 DIAGNOSIS — K21.00 GASTROESOPHAGEAL REFLUX DISEASE WITH ESOPHAGITIS WITHOUT HEMORRHAGE: ICD-10-CM

## 2025-01-21 RX ORDER — PANTOPRAZOLE SODIUM 40 MG/1
40 TABLET, DELAYED RELEASE ORAL DAILY
Qty: 90 TABLET | Refills: 0 | Status: SHIPPED | OUTPATIENT
Start: 2025-01-21 | End: 2025-04-21

## 2025-02-04 ENCOUNTER — HOSPITAL ENCOUNTER (OUTPATIENT)
Dept: CARDIOLOGY | Facility: CLINIC | Age: 75
Discharge: HOME | End: 2025-02-04
Payer: MEDICARE

## 2025-02-04 DIAGNOSIS — I49.5 SICK SINUS SYNDROME DUE TO SA NODE DYSFUNCTION (MULTI): ICD-10-CM

## 2025-02-04 DIAGNOSIS — Z95.0 CARDIAC PACEMAKER IN SITU: ICD-10-CM

## 2025-02-04 PROCEDURE — 93296 REM INTERROG EVL PM/IDS: CPT

## 2025-02-04 PROCEDURE — 93294 REM INTERROG EVL PM/LDLS PM: CPT | Performed by: INTERNAL MEDICINE

## 2025-02-17 DIAGNOSIS — K21.00 GASTROESOPHAGEAL REFLUX DISEASE WITH ESOPHAGITIS WITHOUT HEMORRHAGE: ICD-10-CM

## 2025-02-17 DIAGNOSIS — E78.00 HYPERCHOLESTEROLEMIA: ICD-10-CM

## 2025-02-17 DIAGNOSIS — F41.1 GENERALIZED ANXIETY DISORDER: ICD-10-CM

## 2025-02-17 RX ORDER — ESCITALOPRAM OXALATE 10 MG/1
10 TABLET ORAL DAILY
Qty: 90 TABLET | Refills: 0 | Status: SHIPPED | OUTPATIENT
Start: 2025-02-17 | End: 2025-05-18

## 2025-02-17 RX ORDER — ROSUVASTATIN CALCIUM 20 MG/1
20 TABLET, COATED ORAL
Qty: 90 TABLET | Refills: 1 | Status: SHIPPED | OUTPATIENT
Start: 2025-02-17

## 2025-02-17 RX ORDER — PANTOPRAZOLE SODIUM 40 MG/1
40 TABLET, DELAYED RELEASE ORAL DAILY
Qty: 90 TABLET | Refills: 0 | Status: SHIPPED | OUTPATIENT
Start: 2025-02-17 | End: 2025-05-18

## 2025-04-28 ENCOUNTER — APPOINTMENT (OUTPATIENT)
Dept: PRIMARY CARE | Facility: CLINIC | Age: 75
End: 2025-04-28
Payer: MEDICARE

## 2025-04-28 VITALS
DIASTOLIC BLOOD PRESSURE: 60 MMHG | BODY MASS INDEX: 29.81 KG/M2 | WEIGHT: 196.7 LBS | HEART RATE: 60 BPM | HEIGHT: 68 IN | SYSTOLIC BLOOD PRESSURE: 109 MMHG | OXYGEN SATURATION: 96 %

## 2025-04-28 DIAGNOSIS — I49.5 SICK SINUS SYNDROME (MULTI): ICD-10-CM

## 2025-04-28 DIAGNOSIS — Z17.0 MALIGNANT NEOPLASM OF LOWER-OUTER QUADRANT OF LEFT BREAST OF FEMALE, ESTROGEN RECEPTOR POSITIVE: ICD-10-CM

## 2025-04-28 DIAGNOSIS — N18.31 STAGE 3A CHRONIC KIDNEY DISEASE (MULTI): ICD-10-CM

## 2025-04-28 DIAGNOSIS — F33.9 DEPRESSION, RECURRENT (CMS-HCC): ICD-10-CM

## 2025-04-28 DIAGNOSIS — Z12.31 ENCOUNTER FOR SCREENING MAMMOGRAM FOR BREAST CANCER: ICD-10-CM

## 2025-04-28 DIAGNOSIS — C50.512 MALIGNANT NEOPLASM OF LOWER-OUTER QUADRANT OF LEFT BREAST OF FEMALE, ESTROGEN RECEPTOR POSITIVE: ICD-10-CM

## 2025-04-28 DIAGNOSIS — F41.1 GENERALIZED ANXIETY DISORDER: ICD-10-CM

## 2025-04-28 DIAGNOSIS — Z00.00 ROUTINE GENERAL MEDICAL EXAMINATION AT HEALTH CARE FACILITY: Primary | ICD-10-CM

## 2025-04-28 DIAGNOSIS — D32.9 MENINGIOMA (MULTI): ICD-10-CM

## 2025-04-28 DIAGNOSIS — I25.10 CORONARY ARTERY DISEASE INVOLVING NATIVE CORONARY ARTERY OF NATIVE HEART WITHOUT ANGINA PECTORIS: Chronic | ICD-10-CM

## 2025-04-28 DIAGNOSIS — C50.919 MALIGNANT NEOPLASM OF FEMALE BREAST, UNSPECIFIED ESTROGEN RECEPTOR STATUS, UNSPECIFIED LATERALITY, UNSPECIFIED SITE OF BREAST: ICD-10-CM

## 2025-04-28 DIAGNOSIS — Z23 NEED FOR VACCINATION: ICD-10-CM

## 2025-04-28 PROCEDURE — 99214 OFFICE O/P EST MOD 30 MIN: CPT | Performed by: INTERNAL MEDICINE

## 2025-04-28 PROCEDURE — 1160F RVW MEDS BY RX/DR IN RCRD: CPT | Performed by: INTERNAL MEDICINE

## 2025-04-28 PROCEDURE — 90677 PCV20 VACCINE IM: CPT | Performed by: INTERNAL MEDICINE

## 2025-04-28 PROCEDURE — 1158F ADVNC CARE PLAN TLK DOCD: CPT | Performed by: INTERNAL MEDICINE

## 2025-04-28 PROCEDURE — 1159F MED LIST DOCD IN RCRD: CPT | Performed by: INTERNAL MEDICINE

## 2025-04-28 PROCEDURE — G0439 PPPS, SUBSEQ VISIT: HCPCS | Performed by: INTERNAL MEDICINE

## 2025-04-28 PROCEDURE — 1123F ACP DISCUSS/DSCN MKR DOCD: CPT | Performed by: INTERNAL MEDICINE

## 2025-04-28 PROCEDURE — 3078F DIAST BP <80 MM HG: CPT | Performed by: INTERNAL MEDICINE

## 2025-04-28 PROCEDURE — 1170F FXNL STATUS ASSESSED: CPT | Performed by: INTERNAL MEDICINE

## 2025-04-28 PROCEDURE — 3074F SYST BP LT 130 MM HG: CPT | Performed by: INTERNAL MEDICINE

## 2025-04-28 PROCEDURE — G0009 ADMIN PNEUMOCOCCAL VACCINE: HCPCS | Performed by: INTERNAL MEDICINE

## 2025-04-28 RX ORDER — ESCITALOPRAM OXALATE 20 MG/1
20 TABLET ORAL DAILY
Qty: 90 TABLET | Refills: 0 | Status: SHIPPED | OUTPATIENT
Start: 2025-04-28 | End: 2025-07-27

## 2025-04-28 ASSESSMENT — ACTIVITIES OF DAILY LIVING (ADL)
MANAGING_FINANCES: INDEPENDENT
BATHING: INDEPENDENT
TAKING_MEDICATION: INDEPENDENT
GROCERY_SHOPPING: INDEPENDENT
DOING_HOUSEWORK: INDEPENDENT
DRESSING: INDEPENDENT

## 2025-04-28 NOTE — PROGRESS NOTES
"Subjective   Reason for Visit: Darline Gonzalez is an 75 y.o. female here for a Medicare Wellness visit.     Past Medical, Surgical, and Family History reviewed and updated in chart.         HPI  Taking BP medication as prescribed.  No chest pain headache or dizziness  Vertigo symptoms resolved with treatment.  Recently has sinus congestion and  some dizziness.  No vertigo    Checks pacemaker regularly    Seeing counselor.  Would like to increase medication since depression is not well-controlled  No suicidal thoughts  Son is living with her.  A lot of trust due to that    Had MRI in the past showing small meningioma.  See neurology at that time and told to be not at concerned about it  She is seeing neurology for follow-up since son  has noticed some memory impairment for her  Patient Care Team:  Jo Sanchez MD as PCP - General (Internal Medicine)  Jo Sanchez MD as PCP - [facility]ributed Provider  Nancy Chavarria MD as Consulting Physician (Cardiology)     Review of Systems  No fever chills night sweats  No weight loss  No nausea vomiting or bowel changes  Objective   Vitals:  /60   Pulse 60   Ht 1.727 m (5' 8\")   Wt 89.2 kg (196 lb 11.2 oz)   SpO2 96%   BMI 29.91 kg/m²       Physical Exam  In general, well-appearing, not in acute distress, alert and oriented.  HEENT: No pallor or icterus  Neck: No lymphadenopathy, no stiffness.  Supple.  .No JVD  Chest: Clear to auscultation, good air entry.  CVS: S1 and S2 regular.  No murmur, no gallop, S3 or S4.  No peripheral edema.  No carotid bruit.  Abdomen: Soft, no tenderness, no hepatosplenomegaly.  Extremities: No calf tenderness.  No peripheral edema.   Neuro: No focal deficits.  Psych: Mood and affect normal.  Good judgment and insight   Assessment & Plan  Routine general medical examination at health care facility  Wellness exam and counseling completed.  Has good functional status  Son is power of   Orders:    1 Year Follow Up In Primary " Care - Wellness Exam; Future    Need for vaccination    Orders:    Pneumococcal vaccine 20-valent    diphth,pertus,acell,,tetanus (BoostRIX) 2.5-8-5 Lf-mcg-Lf/0.5mL injection; Inject 0.5 mL into the muscle 1 time for 1 dose.    Encounter for screening mammogram for breast cancer    Orders:    BI mammo bilateral screening tomosynthesis; Future    Sick sinus syndrome (Multi)  Has pacemaker.  Has regular checkups       Malignant neoplasm of female breast, unspecified estrogen receptor status, unspecified laterality, unspecified site of breast  Screening mammogram ordered.  No recurrence       Meningioma (Multi)  Very small meningioma detected incidentally on MRI done for syncope.  She has follow-up with neuro.  No symptoms       Malignant neoplasm of lower-outer quadrant of left breast of female, estrogen receptor positive         Stage 3a chronic kidney disease (Multi)  No signs of fluid overload.  Check labs  Orders:    Albumin-Creatinine Ratio, Urine Random; Future    CBC and Auto Differential; Future    Depression, recurrent (CMS-HCC)  Will increase Lexapro to 20.  Call me with any side effects.  Continue counseling       Coronary artery disease involving native coronary artery of native heart without angina pectoris  Asymptomatic.  Has stent in the past.  Sees cardiology.  Check labs.  Has difficulty walking since she gets out of breath.  Disability sticker given  Orders:    Comprehensive Metabolic Panel; Future    Lipid Panel; Future    Disability Placard    Generalized anxiety disorder  Lexapro increased for better anxiety and depression control.  Continue counseling  Orders:    escitalopram (Lexapro) 20 mg tablet; Take 1 tablet (20 mg) by mouth once daily.    Call me back with any side effects or issues.  Follow-up in 6 months and as needed

## 2025-04-28 NOTE — ASSESSMENT & PLAN NOTE
Very small meningioma detected incidentally on MRI done for syncope.  She has follow-up with neuro.  No symptoms

## 2025-04-28 NOTE — ASSESSMENT & PLAN NOTE
Asymptomatic.  Has stent in the past.  Sees cardiology.  Check labs.  Has difficulty walking since she gets out of breath.  Disability sticker given  Orders:    Comprehensive Metabolic Panel; Future    Lipid Panel; Future    Disability Placard

## 2025-04-28 NOTE — ASSESSMENT & PLAN NOTE
No signs of fluid overload.  Check labs  Orders:    Albumin-Creatinine Ratio, Urine Random; Future    CBC and Auto Differential; Future

## 2025-05-21 ENCOUNTER — TELEPHONE (OUTPATIENT)
Dept: CARDIOLOGY | Facility: CLINIC | Age: 75
End: 2025-05-21
Payer: MEDICARE

## 2025-05-21 NOTE — TELEPHONE ENCOUNTER
"Pt called yesterday, she has been experiencing \"breathlessness\" for the last 4-6 weeks. She experiences this with little exertion such as shopping, getting dressed or going up stairs. After this exertion, she is \"panting and sweating\".     I called pt back yesterday and left a VM, requesting that she send a pacemaker transmission. She sent a transmission today. I notified device RN's and Dr Ramicome for their review.   "

## 2025-05-22 ENCOUNTER — HOSPITAL ENCOUNTER (OUTPATIENT)
Dept: CARDIOLOGY | Facility: CLINIC | Age: 75
Discharge: HOME | End: 2025-05-22
Payer: MEDICARE

## 2025-05-22 DIAGNOSIS — I49.5 SICK SINUS SYNDROME DUE TO SA NODE DYSFUNCTION (MULTI): ICD-10-CM

## 2025-05-22 DIAGNOSIS — Z95.0 CARDIAC PACEMAKER IN SITU: ICD-10-CM

## 2025-05-22 PROCEDURE — 93294 REM INTERROG EVL PM/LDLS PM: CPT | Performed by: INTERNAL MEDICINE

## 2025-05-22 PROCEDURE — 93296 REM INTERROG EVL PM/IDS: CPT

## 2025-05-27 ENCOUNTER — APPOINTMENT (OUTPATIENT)
Dept: NEUROLOGY | Facility: CLINIC | Age: 75
End: 2025-05-27
Payer: MEDICARE

## 2025-05-27 VITALS
HEART RATE: 60 BPM | RESPIRATION RATE: 10 BRPM | TEMPERATURE: 97.5 F | DIASTOLIC BLOOD PRESSURE: 94 MMHG | SYSTOLIC BLOOD PRESSURE: 134 MMHG | WEIGHT: 202 LBS | HEIGHT: 68 IN | BODY MASS INDEX: 30.62 KG/M2

## 2025-05-27 DIAGNOSIS — R26.89 IMBALANCE: ICD-10-CM

## 2025-05-27 DIAGNOSIS — Z86.73 HISTORY OF STROKE: ICD-10-CM

## 2025-05-27 DIAGNOSIS — G47.33 MILD OBSTRUCTIVE SLEEP APNEA: ICD-10-CM

## 2025-05-27 DIAGNOSIS — R41.3 MEMORY LOSS: Primary | ICD-10-CM

## 2025-05-27 DIAGNOSIS — D32.9 MENINGIOMA (MULTI): Chronic | ICD-10-CM

## 2025-05-27 PROCEDURE — 1159F MED LIST DOCD IN RCRD: CPT | Performed by: PSYCHIATRY & NEUROLOGY

## 2025-05-27 PROCEDURE — 1160F RVW MEDS BY RX/DR IN RCRD: CPT | Performed by: PSYCHIATRY & NEUROLOGY

## 2025-05-27 PROCEDURE — G8433 SCR FOR DEP NOT CPT DOC RSN: HCPCS | Performed by: PSYCHIATRY & NEUROLOGY

## 2025-05-27 PROCEDURE — 1126F AMNT PAIN NOTED NONE PRSNT: CPT | Performed by: PSYCHIATRY & NEUROLOGY

## 2025-05-27 PROCEDURE — 99205 OFFICE O/P NEW HI 60 MIN: CPT | Performed by: PSYCHIATRY & NEUROLOGY

## 2025-05-27 PROCEDURE — 3080F DIAST BP >= 90 MM HG: CPT | Performed by: PSYCHIATRY & NEUROLOGY

## 2025-05-27 PROCEDURE — 3075F SYST BP GE 130 - 139MM HG: CPT | Performed by: PSYCHIATRY & NEUROLOGY

## 2025-05-27 PROCEDURE — 1036F TOBACCO NON-USER: CPT | Performed by: PSYCHIATRY & NEUROLOGY

## 2025-05-27 ASSESSMENT — ENCOUNTER SYMPTOMS
LOSS OF SENSATION IN FEET: 0
DEPRESSION: 0
OCCASIONAL FEELINGS OF UNSTEADINESS: 0

## 2025-05-27 ASSESSMENT — PAIN SCALES - GENERAL: PAINLEVEL_OUTOF10: 0-NO PAIN

## 2025-05-27 NOTE — PATIENT INSTRUCTIONS
"It was a pleasure seeing you today.     I want you to get a MRI Brain- Please call radiology to schedule at [phone].   If the results are abnormal, I will call you to discuss.      Please make a follow up appointment in 5-6 months.  You may also schedule a phone or virtual visit sooner on a Friday morning with me as needed before the next clinic appointment.     For any urgent issues or needing to speak to a medical assistant please call [phone], option 6 during our office hours Monday-Friday 8am-4pm, and leave a voicemail with your concern.  My office will try to reach back you as soon as possible within 24 (business) hours.  If you have an emergency please call 911 or visit a local urgent care or nearest emergency room.      Please understand that LEHR is a useful communication tool for simple \"normal\" results or a refill request but I would not recommend using this tool for emergent or urgent issues or for conversations with me.  I am happy to ask my staff to rearrange a follow up visit or a virtual visit sooner than requested if appropriate for your care.    "

## 2025-05-28 LAB
ALBUMIN SERPL-MCNC: 4.7 G/DL (ref 3.6–5.1)
ALP SERPL-CCNC: 71 U/L (ref 37–153)
ALT SERPL-CCNC: 30 U/L (ref 6–29)
ANION GAP SERPL CALCULATED.4IONS-SCNC: 9 MMOL/L (CALC) (ref 7–17)
AST SERPL-CCNC: 26 U/L (ref 10–35)
BASOPHILS # BLD AUTO: 49 CELLS/UL (ref 0–200)
BASOPHILS NFR BLD AUTO: 0.6 %
BILIRUB SERPL-MCNC: 0.7 MG/DL (ref 0.2–1.2)
BUN SERPL-MCNC: 21 MG/DL (ref 7–25)
CALCIUM SERPL-MCNC: 10 MG/DL (ref 8.6–10.4)
CHLORIDE SERPL-SCNC: 107 MMOL/L (ref 98–110)
CHOLEST SERPL-MCNC: 143 MG/DL
CHOLEST/HDLC SERPL: 3.6 (CALC)
CO2 SERPL-SCNC: 26 MMOL/L (ref 20–32)
CREAT SERPL-MCNC: 0.93 MG/DL (ref 0.6–1)
EGFRCR SERPLBLD CKD-EPI 2021: 63 ML/MIN/1.73M2
EOSINOPHIL # BLD AUTO: 189 CELLS/UL (ref 15–500)
EOSINOPHIL NFR BLD AUTO: 2.3 %
ERYTHROCYTE [DISTWIDTH] IN BLOOD BY AUTOMATED COUNT: 13.4 % (ref 11–15)
GLUCOSE SERPL-MCNC: 111 MG/DL (ref 65–99)
HCT VFR BLD AUTO: 44.6 % (ref 35–45)
HDLC SERPL-MCNC: 40 MG/DL
HGB BLD-MCNC: 14.2 G/DL (ref 11.7–15.5)
LDLC SERPL CALC-MCNC: 79 MG/DL (CALC)
LYMPHOCYTES # BLD AUTO: 2099 CELLS/UL (ref 850–3900)
LYMPHOCYTES NFR BLD AUTO: 25.6 %
MCH RBC QN AUTO: 32.1 PG (ref 27–33)
MCHC RBC AUTO-ENTMCNC: 31.8 G/DL (ref 32–36)
MCV RBC AUTO: 100.7 FL (ref 80–100)
MONOCYTES # BLD AUTO: 1033 CELLS/UL (ref 200–950)
MONOCYTES NFR BLD AUTO: 12.6 %
NEUTROPHILS # BLD AUTO: 4830 CELLS/UL (ref 1500–7800)
NEUTROPHILS NFR BLD AUTO: 58.9 %
NONHDLC SERPL-MCNC: 103 MG/DL (CALC)
PLATELET # BLD AUTO: 185 THOUSAND/UL (ref 140–400)
PMV BLD REES-ECKER: 11.8 FL (ref 7.5–12.5)
POTASSIUM SERPL-SCNC: 4.5 MMOL/L (ref 3.5–5.3)
PROT SERPL-MCNC: 7.5 G/DL (ref 6.1–8.1)
RBC # BLD AUTO: 4.43 MILLION/UL (ref 3.8–5.1)
SODIUM SERPL-SCNC: 142 MMOL/L (ref 135–146)
TRIGL SERPL-MCNC: 144 MG/DL
TSH SERPL-ACNC: 2.91 MIU/L (ref 0.4–4.5)
WBC # BLD AUTO: 8.2 THOUSAND/UL (ref 3.8–10.8)

## 2025-06-02 ENCOUNTER — APPOINTMENT (OUTPATIENT)
Dept: CARDIOLOGY | Facility: CLINIC | Age: 75
End: 2025-06-02
Payer: MEDICARE

## 2025-06-02 VITALS
WEIGHT: 199 LBS | HEART RATE: 60 BPM | OXYGEN SATURATION: 97 % | BODY MASS INDEX: 30.26 KG/M2 | SYSTOLIC BLOOD PRESSURE: 136 MMHG | DIASTOLIC BLOOD PRESSURE: 78 MMHG

## 2025-06-02 DIAGNOSIS — I49.9 CARDIAC ARRHYTHMIA, UNSPECIFIED CARDIAC ARRHYTHMIA TYPE: ICD-10-CM

## 2025-06-02 DIAGNOSIS — R00.1 BRADYCARDIA: ICD-10-CM

## 2025-06-02 DIAGNOSIS — I10 PRIMARY HYPERTENSION: ICD-10-CM

## 2025-06-02 DIAGNOSIS — E78.00 HYPERCHOLESTEROLEMIA: ICD-10-CM

## 2025-06-02 DIAGNOSIS — R06.09 DYSPNEA ON EXERTION: ICD-10-CM

## 2025-06-02 DIAGNOSIS — Z95.0 CARDIAC PACEMAKER IN SITU: ICD-10-CM

## 2025-06-02 DIAGNOSIS — I10 BENIGN ESSENTIAL HYPERTENSION: ICD-10-CM

## 2025-06-02 DIAGNOSIS — I49.5 SICK SINUS SYNDROME DUE TO SA NODE DYSFUNCTION (MULTI): ICD-10-CM

## 2025-06-02 DIAGNOSIS — I25.10 CORONARY ARTERY DISEASE INVOLVING NATIVE CORONARY ARTERY OF NATIVE HEART WITHOUT ANGINA PECTORIS: Primary | ICD-10-CM

## 2025-06-02 DIAGNOSIS — R07.9 CHEST PAIN, UNSPECIFIED TYPE: ICD-10-CM

## 2025-06-02 PROCEDURE — 99215 OFFICE O/P EST HI 40 MIN: CPT

## 2025-06-02 PROCEDURE — 1160F RVW MEDS BY RX/DR IN RCRD: CPT

## 2025-06-02 PROCEDURE — 3078F DIAST BP <80 MM HG: CPT

## 2025-06-02 PROCEDURE — 1159F MED LIST DOCD IN RCRD: CPT

## 2025-06-02 PROCEDURE — 3075F SYST BP GE 130 - 139MM HG: CPT

## 2025-06-02 PROCEDURE — 1036F TOBACCO NON-USER: CPT

## 2025-06-02 RX ORDER — AMINOPHYLLINE 25 MG/ML
125 INJECTION, SOLUTION INTRAVENOUS ONCE AS NEEDED
OUTPATIENT
Start: 2025-06-02

## 2025-06-02 RX ORDER — REGADENOSON 0.08 MG/ML
0.4 INJECTION, SOLUTION INTRAVENOUS
OUTPATIENT
Start: 2025-06-02

## 2025-06-02 NOTE — PROGRESS NOTES
Chief complaint  SOB, fatigue, and diaphoresis    HPI  I am seeing Darline today for complaints of SOB, generalized fatigue, and diaphoresis. She reports she is having SOB with going up and down the stairs as well as with minimal exertion. She tells me that she is winded and diaphoretic just walking from parking lot into the store.  Patient also admits to having left sided breast/axillary region pain that is nonradiating and occurs regardless of rest or activity.  She does admit that she is not very physically active and and reports this is due to generalized bodyaches. She denies any CP, palpitations, lightheadedness, syncope, orthopnea, PND, lower extremity edema.      Past Medical History:  Past Medical History:   Diagnosis Date    Anxiety     Aortic stenosis 02/07/2023    mild    Arrhythmia     Sick sinus syndrome due to SA node dysfunction    Benign essential hypertension 02/07/2023    Breast cancer 01/05/2012    Breast cancer of lower-outer quadrant of left female breast 10/24/2015    CAD (coronary artery disease) 02/07/2023    - s/p PTCA/stent 2003 with Dr. Calhoun/ [place]; recent CT cardiac scoring 2-5-18 : coronary calcium score = 906.7      Dr. Chavarria notation 5/3/2023: BMS to RCA 2003       Coronary artery disease     cards: Nancy Chavarria,Aortic stenosis    Depression     Deviated nasal septum     Deviated septum    Dry mouth, unspecified     Dry mouth    Dysfunctional uterine bleeding     Elevated C-reactive protein (CRP)     CRP elevated    Fatty (change of) liver, not elsewhere classified     Fatty liver    Fibroid     Ob/Gyn; Dina Grimes    GERD (gastroesophageal reflux disease)     Heart disease     Hemorrhage of anus and rectum     Rectal bleed    Hypercholesterolemia 02/07/2023    Hyperlipidemia     Hypertension     Incisional hernia     Lacunar infarction (Multi) 02/07/2023    Localized edema     Bilateral edema of lower extremity    Low back pain, unspecified 10/23/2016    Acute low back pain     Meningioma (Multi) 02/07/2023    TRINA on CPAP 02/07/2023    Other abnormal glucose     Elevated glucose    Other conditions influencing health status 07/12/2019    History of cough    Other specified abnormal immunological findings in serum     Positive KURT (antinuclear antibody)    Personal history of malignant neoplasm of breast 06/17/2022    History of malignant neoplasm of breast    Personal history of other benign neoplasm     History of uterine leiomyoma    Personal history of other diseases of the musculoskeletal system and connective tissue     History of necrotizing fasciitis    Personal history of other diseases of the musculoskeletal system and connective tissue     History of temporomandibular joint disorder    Personal history of other diseases of the nervous system and sense organs     History of carpal tunnel syndrome    Restless leg syndrome     Sick sinus syndrome due to SA node dysfunction (Multi) 06/27/2023    Sleep apnea     Uses CPAP    Stage 3a chronic kidney disease (Multi) 04/25/2024    Syncope     hx of syncopal episodes and near syncopal episodes.      Past Surgical History:  She has a past surgical history that includes Dilation and curettage of uterus (02/25/2014); Other surgical history (02/25/2014); Other surgical history (02/25/2014); Breast lumpectomy (02/25/2014); Other surgical history (02/25/2014); MR angio head wo IV contrast (06/29/2020); Other surgical history (06/04/2018); Breast surgery (09/10/2018); Insert / replace / remove pacemaker (06/09/2023); Colonoscopy; Endoscopic insertion peritoneal catheter port; and Cholecystectomy.      Social History:  She reports that she has never smoked. She has never used smokeless tobacco. She reports that she does not currently use alcohol. She reports that she does not use drugs.    Family History:  Family History   Problem Relation Name Age of Onset    Hypertension Mother      Diabetes type II Mother      Breast cancer Mother       Cervical cancer Maternal Grandmother      Breast cancer Mother's Sister       Allergies:  Sulfa (sulfonamide antibiotics) and Latex    Outpatient Medications:  Current Outpatient Medications   Medication Instructions    acetaminophen (TYLENOL) 650 mg, oral, Every 6 hours PRN    aspirin 81 mg, Daily RT    cholecalciferol (Vitamin D-3) 50 mcg (2,000 unit) capsule 1 capsule, Daily    cyanocobalamin (Vitamin B-12) 1,000 mcg tablet 1 tablet, Daily    escitalopram (LEXAPRO) 20 mg, oral, Daily    lisinopril 10 mg, oral, Daily RT    pantoprazole (PROTONIX) 40 mg, oral, Daily    rosuvastatin (CRESTOR) 20 mg, oral, Daily RT     Last Recorded Vitals:  Vitals:    06/02/25 0903   BP: 136/78   Pulse: 60   SpO2: 97%   Weight: 90.3 kg (199 lb)     Physical Exam  General: no acute distress  HEENT: EOMI, no scleral icterus.  Lungs: Clear to auscultation bilaterally without wheezing, rales, or rhonchi.  Cardiovascular: Regular rhythm and rate. Normal S1 and S2. No murmurs, rubs, or gallops are appreciated. JVP normal.  Abdomen: Soft, nontender, nondistended. Bowel sounds present.  Extremities: Warm and well perfused with equal 2+ pulses bilaterally.  No edema.  Neurologic: Alert and oriented x3.     Last Labs:  CBC -  Lab Results   Component Value Date    WBC 8.2 05/27/2025    HGB 14.2 05/27/2025    HCT 44.6 05/27/2025    .7 (H) 05/27/2025     05/27/2025     CMP -  Lab Results   Component Value Date    CALCIUM 10.0 05/27/2025    PROT 7.5 05/27/2025    ALBUMIN 4.7 05/27/2025    AST 26 05/27/2025    ALT 30 (H) 05/27/2025    ALKPHOS 71 05/27/2025    BILITOT 0.7 05/27/2025     LIPID PANEL -   Lab Results   Component Value Date    CHOL 143 05/27/2025    HDL 40 (L) 05/27/2025    CHHDL 3.6 05/27/2025    VLDL 36 02/19/2024    TRIG 144 05/27/2025    NHDL 103 05/27/2025     RENAL FUNCTION PANEL -   Lab Results   Component Value Date    K 4.5 05/27/2025     Lab Results   Component Value Date    BNP 66 10/29/2019    HGBA1C 5.9 (H)  02/19/2024     Procedure    AST 4/9/2024 normal.  Ejection fraction 58%.  Infusion related facial pain and diaphoresis.    Pacemaker generator revision 8/24/2023    Pacemaker 6/29/2023 [employer]    HOLTER [04/03/2023]: SR, -49. Sinus pauses up to 3.6 seconds in duration. No ep/of A-fib / PSVT / high-grade AV block. No VT.      HOLTER [05/16/2022]: SR, -68. No ep/of A-fib / PSVT / high-grade AV block. No VT.      ECHO [05/16/2022]: EF 55-60%. SD = impaired relaxation pattern. Mod cLVH.      HOLTER [04/07/2022]: SR, 34-79-57. Sinus pauses up to 3.5 sec present. No ep/of A-fib / PSVT / high-grade AV block. No VT.     CT [01/07/2022]: Stable 0.7 cm lingular pulm nodule and 0.4 cm RLL nodule. Stable reticulonodular densities w/in TAMANNA anteriorly, likely relates to postop/post-radiation changes. Stable postsurgical changes of the left breast w/o ev/of local dz recurrence. Small hiatal hernia. Severe coronary artery calcifications. 2.5 cm fluid attenuating hepatic cyst.      EX NST [05/05/2021]: 3 min 45 sec (5.40 METs) . . . Normal perfusion imaging. However, diagnostic sensitivity reduced as patient is only able to achieve 59% max pred HR. Well-maint LVF, EF 53%.     CAROTID [06/10/2020]: Less than 50% stenosis MANPREET / LICA      HOME SLEEP STUDY [04/30/2020] = SEVERE sleep apnea      ECHO [2018]: EF 50-55%, mild AS     CATH [2003]: subtotal RCA s/p BMS          Assessment/Plan   CAD, s/p PCI with x1 BMS RCA in 2003.  She did undergo stress testing in 2021, however was only able to get to 59% of her maximal predicted heart rate.  Repeat pharmacological stress testing in 2024 was normal with EF of 58%.  She does report having atypical left-sided pain near breast/axillary area at occurs with rest and activity. She also endorses symptoms of VILLEGAS and exertional fatigue with minimal exertion such as walking from her car to the store.  She denies any symptoms of chest pain today. She is not very physically  active and I believe some of her symptoms of SOB and fatigue may be contributed by deconditioning, however would like her to undergo repeat pharmacological stress testing. Continue aspirin and high intensity statin therapy.    Sinus bradycardia, s/p [employer] pacemaker insertion June 2023 and generator change in August 2023. She is being followed by our device clinic. Most recent device check revealed predominant rhythm sinus rhythm with 1 VT-NS episode lasting 3 seconds, 0 AT/AF episodes since prior evaluation, and normal battery status with 15-year longevity (5/22/2025).      HTN, stable, /78 today.  She is on lisinopril 10 mg daily.  She was previously on atenolol and lisinopril however these were discontinued due to dizziness that was thought to be associated with orthostasis.    HLD, 5/27/2025 LDL 79, HDL 40, triglycerides 144.  She is on rosuvastatin 20 mg daily.  Repeat fasting blood work can be done with her annual labs sometime next year with her PCP.    Dizziness, likely due to orthostasis.  Her symptoms completely resolved after discontinuation of lisinopril and atenolol.  She was restarted on lisinopril due to episodes of hypertension and denies any further recurrence of dizziness.    TRINA, she reports she has not been compliant with her CPAP due to uncomfortable nose pillows     Follow-up with Dr. Chavarria as scheduled.     Coni Hudson, APRN-CNP

## 2025-06-04 ENCOUNTER — HOSPITAL ENCOUNTER (OUTPATIENT)
Dept: RADIOLOGY | Facility: CLINIC | Age: 75
Discharge: HOME | End: 2025-06-04
Payer: MEDICARE

## 2025-06-04 DIAGNOSIS — Z12.31 ENCOUNTER FOR SCREENING MAMMOGRAM FOR BREAST CANCER: ICD-10-CM

## 2025-06-04 PROCEDURE — 77067 SCR MAMMO BI INCL CAD: CPT | Performed by: RADIOLOGY

## 2025-06-04 PROCEDURE — 77063 BREAST TOMOSYNTHESIS BI: CPT

## 2025-06-04 PROCEDURE — 77063 BREAST TOMOSYNTHESIS BI: CPT | Performed by: RADIOLOGY

## 2025-06-06 LAB
ALBUMIN/CREAT UR: 10 MG/G CREAT
CREAT UR-MCNC: 49 MG/DL (ref 20–275)
MICROALBUMIN UR-MCNC: 0.5 MG/DL

## 2025-06-27 ENCOUNTER — HOSPITAL ENCOUNTER (OUTPATIENT)
Dept: RADIOLOGY | Facility: HOSPITAL | Age: 75
Discharge: HOME | End: 2025-06-27
Payer: MEDICARE

## 2025-06-27 ENCOUNTER — HOSPITAL ENCOUNTER (OUTPATIENT)
Dept: CARDIOLOGY | Facility: HOSPITAL | Age: 75
Discharge: HOME | End: 2025-06-27
Payer: MEDICARE

## 2025-06-27 DIAGNOSIS — R06.09 DYSPNEA ON EXERTION: ICD-10-CM

## 2025-06-27 DIAGNOSIS — I25.10 CORONARY ARTERY DISEASE INVOLVING NATIVE CORONARY ARTERY OF NATIVE HEART WITHOUT ANGINA PECTORIS: ICD-10-CM

## 2025-06-27 DIAGNOSIS — R07.9 CHEST PAIN, UNSPECIFIED TYPE: ICD-10-CM

## 2025-06-27 PROCEDURE — 3430000001 HC RX 343 DIAGNOSTIC RADIOPHARMACEUTICALS: Performed by: INTERNAL MEDICINE

## 2025-06-27 PROCEDURE — 93017 CV STRESS TEST TRACING ONLY: CPT

## 2025-06-27 PROCEDURE — A9502 TC99M TETROFOSMIN: HCPCS | Performed by: INTERNAL MEDICINE

## 2025-06-27 PROCEDURE — 78452 HT MUSCLE IMAGE SPECT MULT: CPT

## 2025-06-27 PROCEDURE — 2500000004 HC RX 250 GENERAL PHARMACY W/ HCPCS (ALT 636 FOR OP/ED)

## 2025-06-27 RX ORDER — REGADENOSON 0.08 MG/ML
0.4 INJECTION, SOLUTION INTRAVENOUS
Status: COMPLETED | OUTPATIENT
Start: 2025-06-27 | End: 2025-06-27

## 2025-06-27 RX ADMIN — REGADENOSON 0.4 MG: 0.08 INJECTION, SOLUTION INTRAVENOUS at 11:46

## 2025-06-27 RX ADMIN — TETROFOSMIN 12 MILLICURIE: 0.23 INJECTION, POWDER, LYOPHILIZED, FOR SOLUTION INTRAVENOUS at 10:30

## 2025-06-27 RX ADMIN — TETROFOSMIN 31.2 MILLICURIE: 0.23 INJECTION, POWDER, LYOPHILIZED, FOR SOLUTION INTRAVENOUS at 11:50

## 2025-07-17 ENCOUNTER — APPOINTMENT (OUTPATIENT)
Dept: SLEEP MEDICINE | Facility: CLINIC | Age: 75
End: 2025-07-17
Payer: MEDICARE

## 2025-07-17 VITALS
HEIGHT: 69 IN | WEIGHT: 192 LBS | SYSTOLIC BLOOD PRESSURE: 120 MMHG | BODY MASS INDEX: 28.44 KG/M2 | DIASTOLIC BLOOD PRESSURE: 72 MMHG

## 2025-07-17 DIAGNOSIS — G47.33 OBSTRUCTIVE SLEEP APNEA: Primary | ICD-10-CM

## 2025-07-17 PROCEDURE — 3074F SYST BP LT 130 MM HG: CPT | Performed by: PSYCHIATRY & NEUROLOGY

## 2025-07-17 PROCEDURE — 3078F DIAST BP <80 MM HG: CPT | Performed by: PSYCHIATRY & NEUROLOGY

## 2025-07-17 PROCEDURE — 1126F AMNT PAIN NOTED NONE PRSNT: CPT | Performed by: PSYCHIATRY & NEUROLOGY

## 2025-07-17 PROCEDURE — 99203 OFFICE O/P NEW LOW 30 MIN: CPT | Performed by: PSYCHIATRY & NEUROLOGY

## 2025-07-17 PROCEDURE — 1159F MED LIST DOCD IN RCRD: CPT | Performed by: PSYCHIATRY & NEUROLOGY

## 2025-07-17 ASSESSMENT — SLEEP AND FATIGUE QUESTIONNAIRES
SITING INACTIVE IN A PUBLIC PLACE LIKE A CLASS ROOM OR A MOVIE THEATER: WOULD NEVER DOZE
HOW LIKELY ARE YOU TO NOD OFF OR FALL ASLEEP WHILE LYING DOWN TO REST IN THE AFTERNOON WHEN CIRCUMSTANCES PERMIT: SLIGHT CHANCE OF DOZING
WORRIED_DISTRESSED_DUE_TO_SLEEP: VERY MUCH NOTICEABLE
HOW LIKELY ARE YOU TO NOD OFF OR FALL ASLEEP WHILE SITTING AND READING: MODERATE CHANCE OF DOZING
HOW LIKELY ARE YOU TO NOD OFF OR FALL ASLEEP WHILE SITTING AND TALKING TO SOMEONE: WOULD NEVER DOZE
SLEEP_PROBLEM_INTERFERES_DAILY_ACTIVITIES: SOMEWHAT
HOW LIKELY ARE YOU TO NOD OFF OR FALL ASLEEP WHILE SITTING QUIETLY AFTER LUNCH WITHOUT ALCOHOL: WOULD NEVER DOZE
DIFFICULTY_STAYING_ASLEEP: MILD
SATISFACTION_WITH_CURRENT_SLEEP_PATTERN: SATISFIED
HOW LIKELY ARE YOU TO NOD OFF OR FALL ASLEEP IN A CAR, WHILE STOPPED FOR A FEW MINUTES IN TRAFFIC: WOULD NEVER DOZE
SLEEP_PROBLEM_NOTICEABLE_TO_OTHERS: VERY MUCH NOTICEABLE
ESS-CHAD TOTAL SCORE: 8
HOW LIKELY ARE YOU TO NOD OFF OR FALL ASLEEP WHEN YOU ARE A PASSENGER IN A CAR FOR AN HOUR WITHOUT A BREAK: MODERATE CHANCE OF DOZING
HOW LIKELY ARE YOU TO NOD OFF OR FALL ASLEEP WHILE WATCHING TV: HIGH CHANCE OF DOZING

## 2025-07-17 ASSESSMENT — LIFESTYLE VARIABLES
HOW OFTEN DO YOU HAVE SIX OR MORE DRINKS ON ONE OCCASION: NEVER
AUDIT-C TOTAL SCORE: 0
HOW OFTEN DO YOU HAVE A DRINK CONTAINING ALCOHOL: NEVER
SKIP TO QUESTIONS 9-10: 1
HOW MANY STANDARD DRINKS CONTAINING ALCOHOL DO YOU HAVE ON A TYPICAL DAY: PATIENT DOES NOT DRINK

## 2025-07-17 ASSESSMENT — PAIN SCALES - GENERAL: PAINLEVEL_OUTOF10: 0-NO PAIN

## 2025-07-17 NOTE — PROGRESS NOTES
[facility]  New Visit Note     ASSESSMENT AND PLAN   Ms. Gonzalez is a 75 y.o. female and she returns in followup to the [facility] for the problems listed below on 07/17/25     Problem List, Orders, Assessment, Recommendations:  Problem List Items Addressed This Visit    None  Visit Diagnoses         Codes      Obstructive sleep apnea    -  Primary G47.33    Relevant Orders    Positive Airway Pressure (PAP) Therapy    Follow Up In Adult Sleep Medicine          Disposition  Return to clinic plan to be determined based on 31-90 day post pap follow-up      Subjective    HISTORY OF PRESENT ILLNESS   Darline Gonzalez is a 75 y.o. female with history of TRINA on CPAP, CVA, CAD, HTN, CKD, CVA,  presents to [facility] for a sleep medicine evaluation with concerns of Sleep Apnea, Pap Intolerance, Restless Legs, Unrefreshing Sleep, Excessive Daytime Sleepiness, Snoring, Difficulties Falling Asleep, and Difficulties Staying Asleep. The patient's referring provider is Dina Leroy MD and PCP is Jo Sanchez MD.    Past Sleep History  Past diagnoses: obstructive sleep apnea  Past study: 2020/04/30  HST AHI 47.1  Past treatments: was on CPAP 4-20    Psychometric scales  ESS: 8  MIGUELINA: 13  FOSQ: 25/36    Current History  On today's visit, the patient presented with her son and reports that she continues to have a difficult time with sleep.  She states that since she has retired, she can sleep anytime of the day and does not feel motivated like she used to be while working.  There are reports of snoring, witnessed apneas, and nocturia.  She states that she would like to be treated with PAP therapy again.    She reports that she will lay down to go to sleep and then get frustrated when trying to sleep.  She has done this for months.      She reports that she is normally a morning person and would like to be again.  She has no set  schedule.  We discussed importance of waking the same time every day.        Review of Systems  Sleep-related ROS:  Sleep environment  Bed partner: No   Pets in bed: Yes   Temperature: BEDROOM TEMP: cool  TV: Yes  Noise: No   Issues with bed comfort: No     Preferred sleeping position: sidelying    RLS screen: - Patient has unusual sensations in their extremities that cause an urge to move them.     Sleep Initiation: Patient: does have difficulties; no CBTI prior  Problems going to sleep associated with: electronic devices, frustration with not sleeping, and rumination/thoughts/worries    Sleep Maintenance: Sleep Maintenance: wakes up about 2-3 times per night  Problems staying asleep associated with: nocturia and breathing problems  Breathing during sleep: snoring, snorting during sleep, witnessed apneas, gasping/choking for air, nasal congestion, and mouth breathing  Behaviors during sleep:   DENIES  dreams upon falling asleep  hypnagogic/hypnopompic hallucinations  sleep paralysis  symptoms of cataplexy  sleep walking  kicking, punching, or acting out dreams    Daytime Symptoms  On awakening: morning dry mouth and morning sore throat  During the day: Patient reports: excessively sleepy during the day and memory & concentration  Fatigue: Fatigue concerns: Patient struggles to carry out day to day responsibilities.    Family history: no reported    Social: She reports that she has never smoked. She has never used smokeless tobacco. She reports that she does not currently use alcohol. She reports that she does not use drugs.   Social History[1]     ALLERGIES AND MEDICATIONS   ALLERGIES  Allergies[2]    MEDICATIONS  Current Medications[3]    PAST HISTORY   PAST MEDICAL HISTORY  She has a past medical history of Anxiety, Aortic stenosis (02/07/2023), Arrhythmia, Benign essential hypertension (02/07/2023), Breast cancer (01/05/2012), Breast cancer of lower-outer quadrant of left female breast (10/24/2015), CAD  "(coronary artery disease) (02/07/2023), Coronary artery disease, Depression, Deviated nasal septum, Dry mouth, unspecified, Dysfunctional uterine bleeding, Elevated C-reactive protein (CRP), Fatty (change of) liver, not elsewhere classified, Fibroid, GERD (gastroesophageal reflux disease) (2016), Heart disease, Hemorrhage of anus and rectum, antineoplastic chemo (03/15/2012), Hypercholesterolemia (02/07/2023), Hyperlipidemia, Hypertension, Incisional hernia, Lacunar infarction (Multi) (02/07/2023), Localized edema, Low back pain, unspecified (10/23/2016), Meningioma (Multi) (02/07/2023), TRINA on CPAP (02/07/2023), Other abnormal glucose, Other conditions influencing health status (07/12/2019), Other specified abnormal immunological findings in serum, Personal history of irradiation (08/10/2012), Personal history of malignant neoplasm of breast (06/17/2022), Personal history of other benign neoplasm, Personal history of other diseases of the musculoskeletal system and connective tissue, Personal history of other diseases of the musculoskeletal system and connective tissue, Personal history of other diseases of the nervous system and sense organs, Primary central sleep apnea (2010), Restless leg syndrome (2015), Sick sinus syndrome due to SA node dysfunction (Multi) (06/27/2023), Sleep apnea, Snoring (2015), Stage 3a chronic kidney disease (Multi) (04/25/2024), and Syncope.    PAST SURGICAL HISTORY:  Surgical History[4]    FAMILY HISTORY  Family History[5]      Objective   PHYSICAL EXAM   VITAL SIGNS: /72   Ht 1.74 m (5' 8.5\")   Wt 87.1 kg (192 lb)   BMI 28.77 kg/m²    CURRENT WEIGHT:   Vitals:    07/17/25 1509   Weight: 87.1 kg (192 lb)     Body mass index is 28.77 kg/m².  PREVIOUS WEIGHTS:  Wt Readings from Last 3 Encounters:   06/02/25 90.3 kg (199 lb)   05/27/25 91.6 kg (202 lb)   04/28/25 89.2 kg (196 lb 11.2 oz)     Physical Exam   PHYSICAL EXAM: MODIFIED MALLAMPATI SCORE: III (soft and hard palate and " "base of uvula visible)  TONGUE SCALLOPING: normal midline protrusion without scalloping  NECK EXAM: normal supple no adenopathy  GENERAL: alert pleasant and cooperative no acute distress  MODIFIED GIBBS SCORE: III  UVULA: midline and edematous  CHEST EXAM: breath sounds are clear to auscultation bilaterally without rales, rhonchi, or wheezes  CARDIAC EXAM: Heart sounds are normal.  Regular rate and rhythm without murmur, rubs, or gallop  EXTREMITIES: warm and well-perfused, without cyanosis, clubbing or edema  NEURO: Alert and oriented x 3  PSYCH EXAM: alert,oriented, in NAD with a full range of affect, normal behavior and no psychotic features    RESULTS/DATA   Labs:   CARBON DIOXIDE (mmol/L)   Date Value   05/27/2025 26     Bicarbonate (mmol/L)   Date Value   07/18/2024 24   02/19/2024 25   11/21/2023 20 (L)     Iron (ug/dL)   Date Value   02/07/2020 142   11/13/2018 99     Iron Saturation (%)   Date Value   02/07/2020 43   11/13/2018 29     TIBC (ug/dL)   Date Value   02/07/2020 327   11/13/2018 343     Ferritin (ug/L)   Date Value   02/07/2020 132   11/13/2018 110       Pulmonary Functions Testing Results:  No results found for: \"FEV1\", \"FVC\", \"XVS4NLP\", \"TLC\", \"DLCO\"    Echo:  Results for orders placed in visit on 05/16/22    Echocardiogram    [facility], [address] [place], [place] [address]Tel [phone] and  Fax [phone]    TRANSTHORACIC ECHOCARDIOGRAM REPORT      Patient Name:     ALLAN Gutiérrez Physician:   [id number] Fadumo Andrea MD  Study Date:       5/16/2022      Referring Physician: FADUMO ANDREA  MRN/PID:          [id number]       PCP:  Accession/Order#: [id number]   Department Location: [facility]  YOB: 1950       Fellow:  Gender:           F              Nurse:  Admit Date:                      Sonographer:         Maryse Head RDCS  Height:           172.72 cm      CC Report to:  Weight:           90.72 kg       Study Type:          " Echocardiogram  BSA:              2.04 m2  Blood Pressure: 118 /60 mmHg    Diagnosis/ICD: I35.0-Nonrheumatic aortic (valve) stenosis  Indication:  Procedure/CPT: Echo Complete w Full Doppler-[id number]    Patient History:  Pertinent History: CAD, HTN, Hyperlipidemia and Syncope.    Study Detail: The following Echo studies were performed: 2D, M-Mode, Doppler and  color flow.      PHYSICIAN INTERPRETATION:  Left Ventricle: The left ventricular systolic function is normal, with an estimated ejection fraction of 55-60%. There are no regional wall motion abnormalities. The left ventricular cavity size is normal. There is moderate concentric left ventricular hypertrophy. Spectral Doppler shows an impaired relaxation pattern of left ventricular diastolic filling.  Left Atrium: The left atrium is mildly dilated.  Right Ventricle: The right ventricle is normal in size. There is normal right ventricular global systolic function.  Right Atrium: The right atrium is normal in size.  Aortic Valve: The aortic valve is trileaflet. There is no evidence of aortic valve regurgitation. The peak instantaneous gradient of the aortic valve is 14.7 mmHg. The mean gradient of the aortic valve is 6.8 mmHg.  Mitral Valve: The mitral valve is normal in structure. There is no evidence of mitral valve regurgitation.  Tricuspid Valve: The tricuspid valve is structurally normal. No evidence of tricuspid regurgitation.  Pulmonic Valve: The pulmonic valve is not well visualized. There is physiologic pulmonic valve regurgitation.  Pericardium: There is no pericardial effusion noted.  Aorta: The aortic root is normal.      CONCLUSIONS:  1. The left ventricular systolic function is normal with a 55-60% estimated ejection fraction.  2. Spectral Doppler shows an impaired relaxation pattern of left ventricular diastolic filling.  3. There is moderate concentric left ventricular hypertrophy.    QUANTITATIVE DATA SUMMARY:  2D MEASUREMENTS:  Normal Ranges:  LAs:            4.58 cm    (2.7-4.0cm)  IVSd:          1.83 cm    (0.6-1.1cm)  LVPWd:         1.83 cm    (0.6-1.1cm)  LVIDd:         4.74 cm    (3.9-5.9cm)  LVIDs:         3.20 cm  LV Mass Index: 197.2 g/m2  LV % FS        32.5 %    LA VOLUME:  Normal Ranges:  LA Area A4C: 18.5 cm2  LA Area A2C: 16.6 cm2  LA Vol A4C:  52.6 ml  LA Vol A2C:  46.5 ml    RA VOLUME BY A/L METHOD:  Normal Ranges:  RA Area A4C: 11.0 cm2    M-MODE MEASUREMENTS:  Normal Ranges:  AoV Exc: 1.74 cm (1.5-2.5cm)    AORTA MEASUREMENTS:  Normal Ranges:  AoV Exc: 1.74 cm (1.5-2.5cm)    LV SYSTOLIC FUNCTION BY 2D PLANIMETRY (MOD):  Normal Ranges:  EF-A4C View: 55.7 % (>55%)  EF-A2C View: 65.8 %  EF-Biplane:  59.3 %    LV DIASTOLIC FUNCTION:  Normal Ranges:  MV Peak E:    0.63 m/s (0.7-1.2 m/s)  MV Peak A:    0.71 m/s (0.42-0.7 m/s)  E/A Ratio:    0.89     (1.0-2.2)  MV lateral e' 0.04 m/s  MV medial e'  0.05 m/s    MITRAL VALVE:  Normal Ranges:  MV Vmax:    0.57 m/s (<1.3m/s)  MV peak P.3 mmHg (<5mmHg)  MV mean P.5 mmHg (<2mmHg)  MV VTI:     14.66 cm (10-13cm)  MV DT:      186 msec (150-240msec)    AORTIC VALVE:  Normal Ranges:  AoV Vmax:                1.92 m/s  (<1.7m/s)  AoV Peak P.7 mmHg (<20mmHg)  AoV Mean P.8 mmHg  (1.7-11.5mmHg)  LVOT Max Tato:            0.85 m/s  (<1.1m/s)  AoV VTI:                 33.33 cm  (18-25cm)  LVOT VTI:                15.60 cm  LVOT Diameter:           2.04 cm   (1.8-2.4cm)  AoV Area, VTI:           1.53 cm2  (2.5-5.5cm2)  AoV Area,Vmax:           1.44 cm2  (2.5-4.5cm2)  AoV Dimensionless Index: 0.47    RIGHT VENTRICLE:  RV 1   2.9 cm  RV 2   2.1 cm  RV 3   5.8 cm  TAPSE: 17.0 mm  RV s'  0.11 m/s    PULMONIC VALVE:  Normal Ranges:  RVOT Vmax:  0.67 m/s (0.6-0.9m/s)  PV Max Tato: 0.7 m/s  (0.6-0.9m/s)  PV Max P.8 mmHg    AORTA:  Asc Ao Diam 3.21 cm      [id number] Nancy Chavarria MD  Electronically signed on 2022 at 9:24:47 PM         Final     Failed to redirect to the Timeline version  of the [facility] SmartLink.      Alphonso Zhang MD       [1]   Social History  Socioeconomic History    Marital status:     Number of children: 1   Tobacco Use    Smoking status: Never    Smokeless tobacco: Never   Vaping Use    Vaping status: Never Used   Substance and Sexual Activity    Alcohol use: Not Currently     Comment: 1 drink every 3 months    Drug use: Never    Sexual activity: Not Currently     Partners: Male     Birth control/protection: I.U.D., Post-menopausal     Social Drivers of Health     Financial Resource Strain: Patient Declined (11/20/2023)    Overall Financial Resource Strain (CARDIA)     Difficulty of Paying Living Expenses: Patient declined   Transportation Needs: No Transportation Needs (11/20/2023)    PRAPARE - Transportation     Lack of Transportation (Medical): No     Lack of Transportation (Non-Medical): No   Housing Stability: Unknown (11/20/2023)    Housing Stability Vital Sign     Unable to Pay for Housing in the Last Year: Patient refused     Number of Places Lived in the Last Year: 1     Unstable Housing in the Last Year: No   [2]   Allergies  Allergen Reactions    Sulfa (Sulfonamide Antibiotics) Itching    Latex Rash   [3]   Current Outpatient Medications   Medication Sig Dispense Refill    acetaminophen (Tylenol) 325 mg tablet Take 2 tablets (650 mg) by mouth every 6 hours if needed for mild pain (1 - 3). 90 tablet 2    aspirin 81 mg EC tablet Take 1 tablet (81 mg) by mouth once daily.      cholecalciferol (Vitamin D-3) 50 mcg (2,000 unit) capsule Take 1 capsule (50 mcg) by mouth once daily.      cyanocobalamin (Vitamin B-12) 1,000 mcg tablet Take 1 tablet (1,000 mcg) by mouth once daily.      escitalopram (Lexapro) 20 mg tablet Take 1 tablet (20 mg) by mouth once daily. 90 tablet 0    lisinopril 10 mg tablet Take 1 tablet (10 mg) by mouth once daily. 90 tablet 3    pantoprazole (ProtoNix) 40 mg EC tablet Take 1 tablet (40 mg) by mouth once daily. 90 tablet 0     rosuvastatin (Crestor) 20 mg tablet Take 1 tablet (20 mg) by mouth once daily. 90 tablet 1     No current facility-administered medications for this visit.   [4]   Past Surgical History:  Procedure Laterality Date    BREAST BIOPSY  12/15/2011    BREAST LUMPECTOMY  02/25/2014    Left Breast Lumpectomy    BREAST SURGERY  09/10/2018    Breast Surgery Reduction Procedure    CHOLECYSTECTOMY      COLONOSCOPY      DILATION AND CURETTAGE OF UTERUS  02/25/2014    Dilation And Curettage    ENDOSCOPIC INSERTION PERITONEAL CATHETER PORT      HYSTERECTOMY  11/15/2023    INSERT / REPLACE / REMOVE PACEMAKER  06/09/2023    MR HEAD ANGIO WO IV CONTRAST  06/29/2020    MR HEAD ANGIO WO IV CONTRAST 6/29/2020 [facility]    OOPHORECTOMY  11/25/2024    OTHER SURGICAL HISTORY  02/25/2014    Surgery    OTHER SURGICAL HISTORY  02/25/2014    Skin Debridement Buttocks    OTHER SURGICAL HISTORY  02/25/2014    Percutaneous Portal Vein Catheter Placement    OTHER SURGICAL HISTORY  06/04/2018    History Of Prior Surgery    REDUCTION MAMMAPLASTY  09/15/2017   [5]   Family History  Problem Relation Name Age of Onset    Hypertension Mother      Diabetes type II Mother      Breast cancer Mother      Cervical cancer Maternal Grandmother      Breast cancer Mother's Sister

## 2025-07-17 NOTE — PATIENT INSTRUCTIONS
[facility]  [address]   [place] [facility]  [address]  [place] [address]  [phone]     NAME: Darline Gonzalez   DATE:  07/17/25    DIAGNOSIS  No diagnosis found.    Thank you for coming to the Sleep Medicine Clinic today! Your sleep medicine provider today was: Alphonso Zhang MD Below is a summary of your treatment plan, other important information, and our contact numbers:    TREATMENT PLAN:   - Start using PAP  - Get up the samje time every day and get morning sun for at least 10 minutes  - If you feel like you have been in bed for 20 minutes and not able to sleep, get up and do something else.  Go back to bed when you are drowsy.   - Follow-up: 31-90 days after getting your CPAP  - If not already done, sign up for 'My Chart' and send prescription requests or messages through this    Instructions - Common TRINA Recs: - For your sleep apnea, continue to use your PAP every night and use it whenever you are sleeping.   - Avoid alcohol or sedatives several hours prior to sleeping.   - Get additional supplies for your PAP (e.g., mask, hose, filters) every 3 months or as your insurance allows from your [employer] company. Replacement cushions for your PAP mask can be requested monthly if airseals are an issue.  - Remember to clean your mask, tubings, and water chamber regularly as instructed.  - Avoid driving or operating heavy machinery when drowsy. A person driving while sleepy is five (5) times more likely to have an accident. If you feel sleepy, pull over and take a short power nap (sleep for less than 30 minutes). Otherwise, ask somebody to drive you.    EASY WAYS TO IMPROVE YOUR SLEEP:  1. Go to bed and wake up at the same time every day.   Aim for 8 hours but some people need less, some need more.   Get out of bed if you are not sleeping.   Limit naps to 20 min or less.   2. Expose yourself to daylight and/ or bright light in the morning.   Go outside or spend time  near a window each morning.   You can use a light box (found on [employer]) if you wake before the sunrise.   Limit light exposure in the evenings (including electronic usage).   Try meditation, reading, stretching, deep breathing, warm shower or bath, or yoga nidra as part of your bedtime routine. There are many great FREE, videos or audio tracks on [employer]/ [place], etc for guidance.  3. Exercise, in some form, EVERY day, but not too close to bedtime. Consider making this part of your routine at the start of your day, followed by a cool shower.  4. Eat meals at roughly the same time every day. Make sure you are prioritizing fruits, vegetables, whole grains, lean proteins.  5. Time your caffeine intake. Make sure you are not drinking caffeine within 8 to 12 hours prior to your bedtime.   6. Avoid marijuana, alcohol, and nicotine. They will reduce sleep quality in any quantity.  7. Learn to manage anxiety. Psychology services at  can be reached at [phone] to schedule an appointment.     IMPORTANT INFORMATION:     Call 281 for medical emergencies.  Our offices are generally open from Monday-Friday, 9 am - 5 pm.  If you need to get in touch with me, you may either call me and my team(number is below) or you can use [email or web address].  If a referral for a test, for CPAP, or for another specialist was made, and you have not heard about scheduling this within a week, please call scheduling at [phone] ([phone]).  If you are unable to make your appointment for clinic or an overnight study, kindly call the office at least 48 hours in advance to cancel and reschedule.  If you are on CPAP, please bring your device's card to each clinic appointment unless told otherwise by your provider.  There are no supporting services by either the sleep doctors or their staff on weekends and Holidays, or after 5 PM on weekdays.   If you have been asked to come to a sleep study, make sure you bring toiletries, a comfy pillow, and any nighttime  medications that you may regularly take. Also be sure to eat dinner before you arrive. We generally do not provide meals.      PRESCRIPTIONS:  We require 7 days advanced notice for prescription refills. If we do not receive the request in this time, we cannot guarantee that your medication will be refilled in time. Please contact the sleep nurses listed below for refills or request via [employer].     IMPORTANT PHONE NUMBERS:   Sleep Medicine Clinic Fax: [phone]  Appointments (for Pediatric Sleep Clinic): [phone] - option 1  Appointments (for Adult Sleep Clinic): [phone] - option 2  Appointments (For Sleep Studies): [phone] - option 3  Behavioral Sleep Medicine: [phone]  Sleep Surgery: [phone]  ENT (Otolaryngology): [phone]  Headache Clinic (Neurology): [phone]  Neurology: [phone]  Psychiatry: [phone]  Pulmonary Function Testing (PFT) Center: [phone]  Pulmonary Medicine: [phone]  [employer] (DME): [phone]  [employer] (DME): [phone]  [facility] (DME): [phone]    Our Adult Sleep Medicine Team (Please do not hesitate to call the office or sleep nurse with any questions between appointments):    Adult Sleep Nurses (Yessi Collazo, RN and Ghazala Juarez RN):  For clinical questions and refilling prescriptions: [phone]  Email sleep diaries and other documents at: [email or web address]    Adult Sleep Medicine Secretaries:  Shelby Escoto (For Christopher/Pereira/Krise/Strohl/Yeh): P: [phone]  F: [phone]  Brennon Presley ([place])Office: [phone] option 4 Office Fax: [phone]  Maria Teresa Matos (For Ordonez): P: [phone]  Fax: [phone]  Janny Alvarado (For Jurcevic/Blank): P: [phone]  F: [phone]  Sheree Ghosh (For Riya): P: [phone]  F: [phone]  Yessica Allen (For Jillian/Robin/Zakodyary): P: [phone]  F: [phone]  Ricarda Pack  "(For Octavio/Issa): P: [phone]  F: [phone]     Adult Sleep Medicine Advanced Practice Providers:  Rad Jamison ([place], [place])  Nani Kelly ([facility])  Angelia Raya CNP ([place], [place], [place])  Makeda Lemus CNP ([place], [place], [place])  Dina Ritter ([place], [place], [place])  Gordy Parsons CNP ([place], [place])      Our Sleep Testing Center (STC) Locations:  Our team will contact you to schedule your sleep study, however, you can contact us as follow:  Main Phone Line (scheduling only): [phone]), option 3  Adult and Pediatric Locations  [facility] (6 years and older): [facility] - 4th floor ([address] [facility] [facility]) After hours line: [phone]  [facility] (Main campus: All ages): [facility], 6th floor. After hours line: [phone]   [place] (5 years and older; younger considered on case-by-case basis): [address]; [address], [address]. Scheduling  After hours line: [phone]   [place] (6 years and older): [address]; [address]; [address]   [place] (6 years and older): [address] [facility], [address]  After hours line: [phone]   [place] (13 years and older) in [place]: [address], 2nd floor  After hours line: [phone]   [place] (13 year and older): [address]  After hours line: [phone] (Home studies out of [facility])    Adult Only Locations:   [place] (18 years and older): [address] [facility], 2nd floor   [place] (18 years and older): [address] [facility]; 4th floor  After hours line: [phone]  [facility] (18 years and older) at [place]: [address] [facility]  After hours line: [phone]        CONTACTING YOUR SLEEP MEDICINE PROVIDER:  Send a message directly to your provider through \"My Chart\", which is the email service through your  Records Account: [email or web address] " "[email or web address]   Call [phone] and leave a message. One of the administrative assistants will forward the message to your sleep medicine provider through \"My Chart\" and/or email.     Your sleep medicine provider for this visit was: Alphonso Zhang MD    In the event that you are running more than 15 minutes late to your appointment, I will kindly ask you to reschedule.       "

## 2025-07-29 DIAGNOSIS — F41.1 GENERALIZED ANXIETY DISORDER: ICD-10-CM

## 2025-07-29 RX ORDER — ESCITALOPRAM OXALATE 20 MG/1
20 TABLET ORAL DAILY
Qty: 90 TABLET | Refills: 0 | Status: SHIPPED | OUTPATIENT
Start: 2025-07-29 | End: 2025-10-27

## 2025-07-31 ENCOUNTER — HOSPITAL ENCOUNTER (OUTPATIENT)
Dept: RADIOLOGY | Facility: HOSPITAL | Age: 75
Discharge: HOME | End: 2025-07-31
Payer: MEDICARE

## 2025-07-31 ENCOUNTER — HOSPITAL ENCOUNTER (OUTPATIENT)
Dept: CARDIOLOGY | Facility: HOSPITAL | Age: 75
Discharge: HOME | End: 2025-07-31
Payer: MEDICARE

## 2025-07-31 DIAGNOSIS — Z86.73 PERSONAL HISTORY OF TRANSIENT ISCHEMIC ATTACK (TIA), AND CEREBRAL INFARCTION WITHOUT RESIDUAL DEFICITS: ICD-10-CM

## 2025-07-31 DIAGNOSIS — R41.3 OTHER AMNESIA: ICD-10-CM

## 2025-07-31 DIAGNOSIS — D32.9 BENIGN NEOPLASM OF MENINGES, UNSPECIFIED: ICD-10-CM

## 2025-07-31 PROCEDURE — 93286 PERI-PX EVAL PM/LDLS PM IP: CPT | Performed by: INTERNAL MEDICINE

## 2025-07-31 PROCEDURE — 70551 MRI BRAIN STEM W/O DYE: CPT

## 2025-07-31 PROCEDURE — 93286 PERI-PX EVAL PM/LDLS PM IP: CPT

## 2025-08-11 PROBLEM — Z95.0 PRESENCE OF CARDIAC PACEMAKER: Status: ACTIVE | Noted: 2025-08-11

## 2025-08-11 PROBLEM — I49.5 SICK SINUS SYNDROME DUE TO SA NODE DYSFUNCTION (MULTI): Chronic | Status: RESOLVED | Noted: 2023-06-27 | Resolved: 2025-08-11

## 2025-08-11 PROBLEM — Z85.3 HISTORY OF BREAST CANCER: Status: RESOLVED | Noted: 2023-02-07 | Resolved: 2025-08-11

## 2025-08-11 PROBLEM — I35.0 MILD AORTIC STENOSIS: Status: RESOLVED | Noted: 2023-02-07 | Resolved: 2025-08-11

## 2025-08-15 DIAGNOSIS — K21.00 GASTROESOPHAGEAL REFLUX DISEASE WITH ESOPHAGITIS WITHOUT HEMORRHAGE: ICD-10-CM

## 2025-08-15 RX ORDER — PANTOPRAZOLE SODIUM 40 MG/1
40 TABLET, DELAYED RELEASE ORAL DAILY
Qty: 90 TABLET | Refills: 0 | Status: SHIPPED | OUTPATIENT
Start: 2025-08-15 | End: 2025-11-13

## 2025-08-18 DIAGNOSIS — E78.00 HYPERCHOLESTEROLEMIA: ICD-10-CM

## 2025-08-18 RX ORDER — ROSUVASTATIN CALCIUM 20 MG/1
20 TABLET, COATED ORAL
Qty: 90 TABLET | Refills: 1 | Status: SHIPPED | OUTPATIENT
Start: 2025-08-18

## 2025-08-22 ENCOUNTER — TELEPHONE (OUTPATIENT)
Dept: PRIMARY CARE | Facility: CLINIC | Age: 75
End: 2025-08-22
Payer: MEDICARE

## 2025-09-02 ENCOUNTER — OFFICE VISIT (OUTPATIENT)
Dept: CARDIOLOGY | Facility: CLINIC | Age: 75
End: 2025-09-02
Payer: MEDICARE

## 2025-09-02 VITALS
HEIGHT: 68 IN | OXYGEN SATURATION: 97 % | WEIGHT: 200 LBS | HEART RATE: 60 BPM | DIASTOLIC BLOOD PRESSURE: 80 MMHG | SYSTOLIC BLOOD PRESSURE: 126 MMHG | BODY MASS INDEX: 30.31 KG/M2

## 2025-09-02 DIAGNOSIS — I25.10 CORONARY ARTERY DISEASE INVOLVING NATIVE CORONARY ARTERY OF NATIVE HEART WITHOUT ANGINA PECTORIS: Chronic | ICD-10-CM

## 2025-09-02 DIAGNOSIS — Z95.0 PRESENCE OF CARDIAC PACEMAKER: ICD-10-CM

## 2025-09-02 DIAGNOSIS — R06.09 DYSPNEA ON EXERTION: ICD-10-CM

## 2025-09-02 DIAGNOSIS — E78.00 HYPERCHOLESTEROLEMIA: Chronic | ICD-10-CM

## 2025-09-02 DIAGNOSIS — I10 BENIGN ESSENTIAL HYPERTENSION: Primary | Chronic | ICD-10-CM

## 2025-09-02 PROBLEM — M54.31 SCIATICA OF RIGHT SIDE: Status: RESOLVED | Noted: 2024-11-18 | Resolved: 2025-09-02

## 2025-09-02 PROCEDURE — 99212 OFFICE O/P EST SF 10 MIN: CPT

## 2025-09-02 PROCEDURE — 1159F MED LIST DOCD IN RCRD: CPT | Performed by: INTERNAL MEDICINE

## 2025-09-02 PROCEDURE — 1160F RVW MEDS BY RX/DR IN RCRD: CPT | Performed by: INTERNAL MEDICINE

## 2025-09-02 PROCEDURE — 99214 OFFICE O/P EST MOD 30 MIN: CPT | Performed by: INTERNAL MEDICINE

## 2025-09-02 PROCEDURE — 1036F TOBACCO NON-USER: CPT | Performed by: INTERNAL MEDICINE

## 2025-09-02 PROCEDURE — 3074F SYST BP LT 130 MM HG: CPT | Performed by: INTERNAL MEDICINE

## 2025-09-02 PROCEDURE — 3079F DIAST BP 80-89 MM HG: CPT | Performed by: INTERNAL MEDICINE

## 2025-10-16 ENCOUNTER — APPOINTMENT (OUTPATIENT)
Dept: PRIMARY CARE | Facility: CLINIC | Age: 75
End: 2025-10-16
Payer: MEDICARE

## 2025-10-27 ENCOUNTER — APPOINTMENT (OUTPATIENT)
Dept: PRIMARY CARE | Facility: CLINIC | Age: 75
End: 2025-10-27
Payer: MEDICARE

## 2025-10-28 ENCOUNTER — APPOINTMENT (OUTPATIENT)
Dept: NEUROLOGY | Facility: CLINIC | Age: 75
End: 2025-10-28
Payer: MEDICARE

## (undated) DEVICE — LIGASURE, V SEALER/DIVIDER  5MM BLUNT TIP

## (undated) DEVICE — SYRINGE, 60 CC, LUER LOCK, MONOJECT, W/O CAP, LF

## (undated) DEVICE — SYSTEM, FIOS FIRST ENTRY, 5 X 100MM, KII ADVANCED FIXATION

## (undated) DEVICE — CATHETER, URETHRAL, FOLEY, 2 WAY, BARDEX IC, 16 FR, 5 CC, SILICONE

## (undated) DEVICE — SHEAR, W/UNIPOLAR CAUTERY, ENDOSHEAR, 5 MM

## (undated) DEVICE — TUBING SET, TRI-LUMEN, FILTERED, F/AIRSEAL

## (undated) DEVICE — PROTECTOR, NERVE, ULNAR, PINK

## (undated) DEVICE — ACCESS PORT, 5MM, 100MM LENGTH, LOW PROFILE W/BLADELESS OPTICAL TIP

## (undated) DEVICE — POSITIONING, THE PINK PAD, PIGAZZI SYSTEM

## (undated) DEVICE — PUMP, STRYKERFLOW 2 & HANDPIECE W/10FT. IRRIGATION TUBING

## (undated) DEVICE — CARE KIT, LAPAROSCOPIC, ADVANCED

## (undated) DEVICE — IRRIGATION SET, Y, LARGE BORE

## (undated) DEVICE — DRAPE, INSTRUMENT, W/POUCH, STERI DRAPE, 9 5/8 X 18 LONG

## (undated) DEVICE — SYRINGE, LUER LOCK, 12ML

## (undated) DEVICE — IRRIGATION SET, CYSTOSCOPY, F/CONSTANT/INTERMITTENT, 8 GTT/CC, 77 IN

## (undated) DEVICE — GOWN, SURGICAL, SMARTGOWN, XLARGE, STERILE

## (undated) DEVICE — Device

## (undated) DEVICE — OCCLUDER, COLPO-PNEUMO

## (undated) DEVICE — DRAPE, PAD, PREP, W/ 9 IN CUFF, 24 X 41, LF, NS

## (undated) DEVICE — SYRINGE, 60 CC, IRRIGATION, BULB, CONTRO-BULB, PAPER POUCH

## (undated) DEVICE — COVER, TABLE, 44 X 75 IN, DISPOSABLE, LF, STERILE

## (undated) DEVICE — COVER, CART, 45 X 27 X 48 IN, CLEAR

## (undated) DEVICE — MANIFOLD, 4 PORT NEPTUNE STANDARD

## (undated) DEVICE — TRAY, MINOR, SINGLE BASIN, STERILE

## (undated) DEVICE — COVER, MAYO STAND, W/PAD, 23 IN, DISPOSABLE, PLASTIC, LF, STERILE

## (undated) DEVICE — PREP TRAY, SKIN, DRY, W/GLOVES

## (undated) DEVICE — DRAPE, LEGGINGS, 48 X 31 IN, STERILE, LF

## (undated) DEVICE — TROCAR SYSTEM, BALLOON, KII GELPORT, 12 X 100MM

## (undated) DEVICE — REST, HEAD, BAGEL, 9 IN

## (undated) DEVICE — CANNULA, KII ADVANCED FIXATION, 5X100MM W/SEAL

## (undated) DEVICE — SUTURE, VICRYL, 0, 27 IN, UR-6, VIOLET

## (undated) DEVICE — SUTURE, VICRYL, 0, 3 X 27 IN, CT-1, VIOLET

## (undated) DEVICE — DRESSING, ADHESIVE, ISLAND, TELFA, 2 X 3.75 IN, LF

## (undated) DEVICE — SUTURE, VICRYL, 4-0, 18 IN, UNDYED BR PS-2

## (undated) DEVICE — RETRACTOR, CERVICAL CUP, VCARE, STANDARD